# Patient Record
Sex: FEMALE | Race: WHITE
[De-identification: names, ages, dates, MRNs, and addresses within clinical notes are randomized per-mention and may not be internally consistent; named-entity substitution may affect disease eponyms.]

---

## 2020-09-21 ENCOUNTER — HOSPITAL ENCOUNTER (EMERGENCY)
Dept: HOSPITAL 46 - ED | Age: 70
End: 2020-09-21
Payer: MEDICARE

## 2020-09-21 VITALS — BODY MASS INDEX: 21.69 KG/M2 | HEIGHT: 66 IN | WEIGHT: 134.99 LBS

## 2020-09-21 DIAGNOSIS — I48.91: ICD-10-CM

## 2020-09-21 DIAGNOSIS — Z79.899: ICD-10-CM

## 2020-09-21 DIAGNOSIS — I49.9: Primary | ICD-10-CM

## 2020-09-21 DIAGNOSIS — Z87.891: ICD-10-CM

## 2020-09-21 DIAGNOSIS — Z88.8: ICD-10-CM

## 2020-09-21 NOTE — XMS
Encounter Summary
  Created on: 2020
 
 SayraSusana
 External Reference #: [id number]
 : 48
 Sex: Female
 
 Demographics
 
 
+-----------------------+---------------------------+
| Address               | [address]            |
|                       | [place]  [address] |
+-----------------------+---------------------------+
| Home Phone            | [phone]           |
+-----------------------+---------------------------+
| Preferred Language    | Unknown                   |
+-----------------------+---------------------------+
| Marital Status        |                    |
+-----------------------+---------------------------+
| [employer] Affiliation | [id number]                      |
+-----------------------+---------------------------+
| Race                  | White                     |
+-----------------------+---------------------------+
| Ethnic Group          | Not  or     |
+-----------------------+---------------------------+
 
 
 Author
 
 
+--------------+--------------------------------------------+
| Author       | [facility] [place]  |
|              | and [place]                                |
+--------------+--------------------------------------------+
| Organization | [facility] [place]  |
|              | and [place]                                |
+--------------+--------------------------------------------+
| Address      | Unknown                                    |
+--------------+--------------------------------------------+
| Phone        | Unavailable                                |
+--------------+--------------------------------------------+
 
 
 
 Support
 
 
+-------------+--------------+-------------------+-----------------+
| Name        | Relationship | Address           | Phone           |
+-------------+--------------+-------------------+-----------------+
| Eduar Colbert | ECON         | [address]       | [phone] |
|             |              | [place]   |                 |
|             |              | [address]             |                 |
+-------------+--------------+-------------------+-----------------+
 
 
 
 
 Care Team Providers
 
 
+-------------------------+------+-----------------+
| Care Team Member Name   | Role | Phone           |
+-------------------------+------+-----------------+
| Tobin Alex MD | PCP  | [phone] |
+-------------------------+------+-----------------+
 
 
 
 Reason for Visit
 
 
+-----------+--------------------------------------------------------------+
| Reason    | Comments                                                     |
+-----------+--------------------------------------------------------------+
| Follow-up | stool studies done 2017, previous positive Campylobacter |
+-----------+--------------------------------------------------------------+
 
 
 
 Encounter Details
 
 
+--------+---------+----------------------+----------------------+---------------------+
| Date   | Type    | Department           | Care Team            | Description         |
+--------+---------+----------------------+----------------------+---------------------+
| / | Office  |   [facility]          |   Mahin Leiva MD | Diarrhea,           |
| 2017   | Visit   | GASTROENTEROLOGY     |   [address], [facility]  | unspecified type    |
|        |         | [address] [place]  | [address] | (Primary Dx); Full  |
|        |         | [address]  [place] |  [address] [phone] | incontinence of     |
|        |         |  [address]          |   [phone] (Fax) | feces               |
|        |         | [phone]         |                      |                     |
+--------+---------+----------------------+----------------------+---------------------+
 
 
 
 Social History
 
 
+---------------+------------+-----------+--------+------------------+
| Tobacco Use   | Types      | Packs/Day | Years  | Date             |
|               |            |           | Used   |                  |
+---------------+------------+-----------+--------+------------------+
| Former Smoker | Cigarettes | 1.5       | 5      | Quit: 1979 |
+---------------+------------+-----------+--------+------------------+
 
 
 
+---------------------+---+---+---+
| Smokeless Tobacco:  |   |   |   |
| Never Used          |   |   |   |
+---------------------+---+---+---+
 
 
 
+-------------+----------------------+---------+------------------+
| Alcohol Use | Drinks/Week          | oz/Week | Comments         |
 
+-------------+----------------------+---------+------------------+
| Yes         |   1 Shots of liquor  | 1.0     | "social drinker" |
|             |  0 Standard drinks   |         |                  |
|             | or equivalent        |         |                  |
+-------------+----------------------+---------+------------------+
 
 
 
+------------------+---------------+
| Sex Assigned at  | Date Recorded |
| Birth            |               |
+------------------+---------------+
| Not on file      |               |
+------------------+---------------+
 documented as of this encounter
 
 Last Filed Vital Signs
 
 
+-------------------+------------------+----------------------+----------+
| Vital Sign        | Reading          | Time Taken           | Comments |
+-------------------+------------------+----------------------+----------+
| Blood Pressure    | 100/70           | 2017  1:32 PM  |          |
|                   |                  | PDT                  |          |
+-------------------+------------------+----------------------+----------+
| Pulse             | 64               | 2017  1:32 PM  |          |
|                   |                  | PDT                  |          |
+-------------------+------------------+----------------------+----------+
| Temperature       | -                | -                    |          |
+-------------------+------------------+----------------------+----------+
| Respiratory Rate  | 16               | 2017  1:32 PM  |          |
|                   |                  | PDT                  |          |
+-------------------+------------------+----------------------+----------+
| Oxygen Saturation | -                | -                    |          |
+-------------------+------------------+----------------------+----------+
| Inhaled Oxygen    | -                | -                    |          |
| Concentration     |                  |                      |          |
+-------------------+------------------+----------------------+----------+
| Weight            | 64.9 kg (143 lb) | 2017  1:32 PM  |          |
|                   |                  | PDT                  |          |
+-------------------+------------------+----------------------+----------+
| Height            | 170.2 cm (5' 7") | 2017  1:32 PM  |          |
|                   |                  | PDT                  |          |
+-------------------+------------------+----------------------+----------+
| Body Mass Index   | 22.4             | 2017  1:32 PM  |          |
|                   |                  | PDT                  |          |
+-------------------+------------------+----------------------+----------+
 documented in this encounter
 
 Progress Notes
 Mahin Leiva MD - 2017  1:00 PM PDTFormatting of this note might be different from
 the original.
 Subjective: 
  
 Patient ID: Susana Colbert is a 67 y.o. female.
 
 The patient returns to discuss recurrent diarrhea.
 
 Patient underwent colonoscopy in 2017 for evaluation of diarrhea.  Upper endoscopy an
d colonoscopy were negative with respect to visual appearance and biopsies of the mucosa.  C
 
ampylobacter however was positive.  She was treated with azithromycin 500 mg 1 by mouth yovanny
y for 3 days.  She did fairly well for a week or 2 after treatment.  However the stool was g
radually gotten looser and she's had urgency with watery stools.  She denies any blood or mu
cus in the stools chills or fever.  She has increased stool frequency after meals.  Frequenc
y is now up to 5 or 6 a day.  She denies any nocturnal stools.  The patient denies any abdom
inal pain has some lower abdominal cramping prior to evacuation.  Repeat stool studies were 
negative done through into
 
 Patches were negative as well as Giardia antigen and cryptosporidium and C. diff.  The ayala
ent's weight is been stable.  The patient's  has not been ill.  They have well water 
but there is no livestock around the same for possible contamination.  The patient has past 
history of irritable bowel syndrome.  Patient has not tried Imodium or Lomotil as been on so
me probiotics.
 
 Vitals: 
  17 1332 
 BP: 100/70 
 Pulse: 64 
 Resp: 16 
 PainSc:   0 - No pain 
 
 Allergies 
 Allergen Reactions 
   Moexipril Hydrochloride Shortness Of Breath 
   AKA Univasc 
   Metoclopramide Other (See Comments) 
   depression 
   Oxycodone  
 
 Past Medical History: 
 Diagnosis Date 
   Anomalous atrioventricular excitation 2014 
   Arthritis  
   Atrial fibrillation (HCC) 2014 
   Atrial flutter (HCC)  
   Depression with anxiety 2014 
   Disorder of bone and cartilage, unspecified 2014 
   Esophageal reflux 2014 
   Essential hypertension 2014 
   H/O campylobacteriosis 2017 
   Hyperlipidemia 2014 
   Internal derangement of knee 2014 
   Osteoarthritis  
   Osteopenia 2014 
   Postmenopausal disorder 2014 
   Unspecified essential hypertension 2014 
   Unspecified internal derangement of knee 2014 
 
 Past Surgical History: 
 Procedure Laterality Date 
   ATRIAL ABLATION SURGERY   
  Dr. Wilson 
   CARPAL TUNNEL RELEASE Left  
   CARPAL TUNNEL RELEASE Right  
   CATARACT REMOVAL  2013 
   COLONOSCOPY   
   COLONOSCOPY N/A 2017 
  Procedure: COLONOSCOPY;  Surgeon: Mahin Leiva MD;  Location: [facility] MEDICAL PROCEDURE UNIT
 
   HIP ARTHROSCOPY Left  
 
   KNEE ARTHROSCOPY Left  
   KNEE JOINT REPLACEMENT Left 2015 
  Dr. Mike 
   TONSILLECTOMY   
   UPPER GASTROINTESTINAL ENDOSCOPY N/A 2017 
  Procedure: EGD;  Surgeon: Mahin Leiva MD;  Location: [facility] MEDICAL PROCEDURE UNIT 
   VAGINA SURGERY   
 
 Family History 
 Problem Relation Age of Onset 
   Heart attack Father  
   Osteoarthritis Father  
   Hypertension Father  
   Muscle disease Father  
   Kidney disease Father  
   Gout Father  
   Osteoporosis Mother  
   Dementia Mother  
   Other (see comment) Mother  
   PAF 
   Hypertension Mother  
   Muscle disease Mother  
   Kidney disease Mother  
   Heart attack Mother  
   Cancer Mother  
   Hypertension Other  
   Sibling history 
   Heart attack Other  
   Sibling history 
   Muscle disease Other  
   Sibling history 
   Kidney disease Other  
   Sibling history 
 
 Social History 
 
 Social History 
   Marital status:  
   Spouse name: N/A 
   Number of children: N/A 
   Years of education: N/A 
 
 Occupational History 
   Retired LPN  
 
 Social History Main Topics 
   Smoking status: Former Smoker 
   Packs/day: 1.50 
   Years: 5.00 
   Types: Cigarettes 
   Quit date: 1979 
   Smokeless tobacco: Never Used 
   Alcohol use 0.6 oz/week 
   1 Shots of liquor per week 
    Comment: "social drinker" 
   Drug use: No 
   Sexual activity: Not Asked 
 
 Other Topics Concern 
   None 
 
 
 Social History Narrative 
   None 
 
 }
 
 Review of Systems 
 Gastrointestinal: Positive for diarrhea. 
 All other systems reviewed and are negative.
 
 Objective: 
 /70  | Pulse 64  | Resp 16  | Ht 1.702 m (5' 7")  | Wt 64.9 kg (143 lb)  | BMI 22.40 
kg/m 
 
 Physical Exam 
 Constitutional: She is oriented to person, place, and time. She appears well-developed and 
well-nourished. No distress. 
 HENT: 
 Head: Normocephalic and atraumatic. 
 Right Ear: External ear normal. 
 Left Ear: External ear normal. 
 Nose: Nose normal. 
 Mouth/Throat: Oropharynx is clear and moist. No oropharyngeal exudate. 
 Eyes: Conjunctivae and EOM are normal. Pupils are equal, round, and reactive to light. Righ
t eye exhibits no discharge. Left eye exhibits no discharge. No scleral icterus. 
 Neck: Normal range of motion. Neck supple. No JVD present. No tracheal deviation present. 
 Cardiovascular: Normal rate, regular rhythm, normal heart sounds and intact distal pulses. 
 Exam reveals no gallop and no friction rub.  
 No murmur heard.
 Pulmonary/Chest: Effort normal and breath sounds normal. No stridor. No respiratory distres
s. She has no wheezes. She has no rales. She exhibits no tenderness. 
 Abdominal: Soft. Bowel sounds are normal. She exhibits no distension and no mass. There is 
no tenderness. There is no rebound and no guarding. 
 Musculoskeletal: Normal range of motion. She exhibits no edema, tenderness or deformity. 
 Lymphadenopathy: 
   She has no cervical adenopathy. 
 Neurological: She is alert and oriented to person, place, and time. No cranial nerve defici
t. She exhibits normal muscle tone. Coordination normal. 
 Skin: Skin is warm and dry. No rash noted. She is not diaphoretic. No erythema. No pallor. 
 Psychiatric: She has a normal mood and affect. Her behavior is normal. Judgment and thought
 content normal. 
 Nursing note and vitals reviewed.
 
 Assessment: 
 
 Diarrhea questional postinfectious IBS versus recurrent infection although recent stool cul
ture was negative
 
 Plan: 
 
 Repeat course of azithromycin.  If that is negative with respect to Campylobacter will in t
reatment toward postinfectious IBS
 
 Electronically signed by Mahin Leiva MD at 2017  5:12 PM PDTdocumented in this enc
ounter
 
 Plan of Treatment
 
 
+--------+---------+-----------+----------------------+-------------+
 
| Date   | Type    | Specialty | Care Team            | Description |
+--------+---------+-----------+----------------------+-------------+
| 10/12/ | Office  | Neurology |   Jamin Brooks MD  [address] |             |
| 2020   | Visit   |           |  [address]      |             |
|        |         |           | [place]  |             |
|        |         |           | [place] [address]             |             |
|        |         |           | [phone]         |             |
|        |         |           | [phone] (Fax)   |             |
+--------+---------+-----------+----------------------+-------------+
 documented as of this encounter
 
 Visit Diagnoses
 
 
+----------------------------------------+
| Diagnosis                              |
+----------------------------------------+
|   Diarrhea, unspecified type - Primary |
+----------------------------------------+
|   Full incontinence of feces           |
+----------------------------------------+
 documented in this encounter

## 2020-09-21 NOTE — XMS
Encounter Summary
  Created on: 2020
 
 SayraSusana
 External Reference #: [id number]
 : 48
 Sex: Female
 
 Demographics
 
 
+-----------------------+---------------------------+
| Address               | [address]            |
|                       | [place]  [address] |
+-----------------------+---------------------------+
| Home Phone            | [phone]           |
+-----------------------+---------------------------+
| Preferred Language    | Unknown                   |
+-----------------------+---------------------------+
| Marital Status        |                    |
+-----------------------+---------------------------+
| Gnosticism Affiliation | [id number]                      |
+-----------------------+---------------------------+
| Race                  | White                     |
+-----------------------+---------------------------+
| Ethnic Group          | Not  or     |
+-----------------------+---------------------------+
 
 
 Author
 
 
+--------------+--------------------------------------------+
| Author       | [facility] [place]  |
|              | and [place]                                |
+--------------+--------------------------------------------+
| Organization | [facility] [place]  |
|              | and [place]                                |
+--------------+--------------------------------------------+
| Address      | Unknown                                    |
+--------------+--------------------------------------------+
| Phone        | Unavailable                                |
+--------------+--------------------------------------------+
 
 
 
 Support
 
 
+-------------+--------------+-------------------+-----------------+
| Name        | Relationship | Address           | Phone           |
+-------------+--------------+-------------------+-----------------+
| Eduar Colbert | ECON         | [address]       | [phone] |
|             |              | [place]   |                 |
|             |              | [address]             |                 |
+-------------+--------------+-------------------+-----------------+
 
 
 
 
 Care Team Providers
 
 
+-----------------------+------+-------------+
| Care Team Member Name | Role | Phone       |
+-----------------------+------+-------------+
 PCP  | Unavailable |
+-----------------------+------+-------------+
 
 
 
 Encounter Details
 
 
+--------+-----------+----------------------+-----------+-------------+
| Date   | Type      | Department           | Care Team | Description |
+--------+-----------+----------------------+-----------+-------------+
| 10/28/ | Hospital  |   [facility] |           |             |
|    | Encounter |  MED CTR XRAY  [address] |           |             |
|        |           |  [address]       |           |             |
|        |           | [place] [address] |           |             |
|        |           |   [phone]       |           |             |
+--------+-----------+----------------------+-----------+-------------+
 
 
 
 Social History
 
 
+----------------+-------+-----------+--------+------+
| Tobacco Use    | Types | Packs/Day | Years  | Date |
|                |       |           | Used   |      |
+----------------+-------+-----------+--------+------+
| Never Assessed |       |           |        |      |
+----------------+-------+-----------+--------+------+
 
 
 
+------------------+---------------+
| Sex Assigned at  | Date Recorded |
| Birth            |               |
+------------------+---------------+
| Not on file      |               |
+------------------+---------------+
 documented as of this encounter
 
 Plan of Treatment
 
 
+--------+---------+-----------+----------------------+-------------+
| Date   | Type    | Specialty | Care Team            | Description |
+--------+---------+-----------+----------------------+-------------+
| 10/12/ | Office  | Neurology |   Jamin Brooks MD  [address] |             |
| 2020   | Visit   |           |  [address]      |             |
|        |         |           | [place]  |             |
|        |         |           | [place] [address]             |             |
|        |         |           | [phone]         |             |
|        |         |           | [phone] (Fax)   |             |
+--------+---------+-----------+----------------------+-------------+
 
 documented as of this encounter
 
 Visit Diagnoses
 Not on filedocumented in this encounter

## 2020-09-21 NOTE — XMS
Encounter Summary
  Created on: 2020
 
 SayraSusana
 External Reference #: [id number]
 : 48
 Sex: Female
 
 Demographics
 
 
+-----------------------+---------------------------+
| Address               | [address]            |
|                       | [place]  [address] |
+-----------------------+---------------------------+
| Home Phone            | [phone]           |
+-----------------------+---------------------------+
| Preferred Language    | Unknown                   |
+-----------------------+---------------------------+
| Marital Status        |                    |
+-----------------------+---------------------------+
| [employer] Affiliation | 1073                      |
+-----------------------+---------------------------+
| Race                  | White                     |
+-----------------------+---------------------------+
| Ethnic Group          | Not  or     |
+-----------------------+---------------------------+
 
 
 Author
 
 
+--------------+--------------------------------------------+
| Author       | [facility] [place]  |
|              | and [place]                                |
+--------------+--------------------------------------------+
| Organization | [facility] [place]  |
|              | and [place]                                |
+--------------+--------------------------------------------+
| Address      | Unknown                                    |
+--------------+--------------------------------------------+
| Phone        | Unavailable                                |
+--------------+--------------------------------------------+
 
 
 
 Support
 
 
+-------------+--------------+-------------------+-----------------+
| Name        | Relationship | Address           | Phone           |
+-------------+--------------+-------------------+-----------------+
| Eduar Colbert | ECON         | [address]       | [phone] |
|             |              | [place]   |                 |
|             |              | [address]             |                 |
+-------------+--------------+-------------------+-----------------+
 
 
 
 
 Care Team Providers
 
 
+-----------------------+------+-------------+
| Care Team Member Name | Role | Phone       |
+-----------------------+------+-------------+
 PCP  | Unavailable |
+-----------------------+------+-------------+
 
 
 
 Encounter Details
 
 
+--------+-----------+----------------------+-----------+-------------+
| Date   | Type      | Department           | Care Team | Description |
+--------+-----------+----------------------+-----------+-------------+
| / | Hospital  |   [facility] |           |             |
|    | Encounter |  MED CTR LABORATORY  |           |             |
|        |           |  [address] |           |             |
|        |           |  [place]           |           |             |
|        |           | [address]           |           |             |
|        |           | [phone]         |           |             |
+--------+-----------+----------------------+-----------+-------------+
 
 
 
 Social History
 
 
+----------------+-------+-----------+--------+------+
| Tobacco Use    | Types | Packs/Day | Years  | Date |
|                |       |           | Used   |      |
+----------------+-------+-----------+--------+------+
| Never Assessed |       |           |        |      |
+----------------+-------+-----------+--------+------+
 
 
 
+------------------+---------------+
| Sex Assigned at  | Date Recorded |
| Birth            |               |
+------------------+---------------+
| Not on file      |               |
+------------------+---------------+
 documented as of this encounter
 
 Plan of Treatment
 
 
+--------+---------+-----------+----------------------+-------------+
| Date   | Type    | Specialty | Care Team            | Description |
+--------+---------+-----------+----------------------+-------------+
| 10/12/ | Office  | Neurology |   Jamin Brooks MD  [address] |             |
| 2020   | Visit   |           |  [address]      |             |
|        |         |           | [place]  |             |
|        |         |           | [place] [address]             |             |
|        |         |           | [phone]         |             |
|        |         |           | [phone] (Fax)   |             |
 
+--------+---------+-----------+----------------------+-------------+
 documented as of this encounter
 
 Visit Diagnoses
 Not on filedocumented in this encounter

## 2020-09-21 NOTE — XMS
Encounter Summary
  Created on: 2020
 
 SayraSusana
 External Reference #: [id number]
 : 48
 Sex: Female
 
 Demographics
 
 
+-----------------------+---------------------------+
| Address               | [address]            |
|                       | [place]  [address] |
+-----------------------+---------------------------+
| Home Phone            | [phone]           |
+-----------------------+---------------------------+
| Preferred Language    | Unknown                   |
+-----------------------+---------------------------+
| Marital Status        |                    |
+-----------------------+---------------------------+
| Adventism Affiliation | [id number]                      |
+-----------------------+---------------------------+
| Race                  | White                     |
+-----------------------+---------------------------+
| Ethnic Group          | Not  or     |
+-----------------------+---------------------------+
 
 
 Author
 
 
+--------------+--------------------------------------------+
| Author       | [facility]  |
|              | and [place]                                |
+--------------+--------------------------------------------+
| Organization | [facility]  |
|              | and [place]                                |
+--------------+--------------------------------------------+
| Address      | Unknown                                    |
+--------------+--------------------------------------------+
| Phone        | Unavailable                                |
+--------------+--------------------------------------------+
 
 
 
 Support
 
 
+-------------+--------------+-------------------+-----------------+
| Name        | Relationship | Address           | Phone           |
+-------------+--------------+-------------------+-----------------+
| Eduar Colbert | ECON         | [address]       | [phone] |
|             |              | [place]   |                 |
|             |              | [address]             |                 |
+-------------+--------------+-------------------+-----------------+
 
 
 
 
 Care Team Providers
 
 
+-------------------------+------+-----------------+
| Care Team Member Name   | Role | Phone           |
+-------------------------+------+-----------------+
| Tobin Alex MD | PCP  | [phone] |
+-------------------------+------+-----------------+
 
 
 
 Encounter Details
 
 
+--------+-------------+----------------------+----------------------+----------------------
+
| Date   | Type        | Department           | Care Team            | Description          
|
+--------+-------------+----------------------+----------------------+----------------------
+
| / | Orders Only |   [facility]          |   Juan Carlos Howard,   | Right knee pain,     
|
| 2018   |             | ORTHOPEDIC SURGERY   | MD  [address]     | unspecified          
|
|        |             | [address]  [place] | [place]      | chronicity (Primary  
|
|        |             |  [place]           | [address]  [phone]  | Dx)                  
|
|        |             | [address]           |  [phone] (Fax)  |                      
|
|        |             | [phone]         |                      |                      
|
+--------+-------------+----------------------+----------------------+----------------------
+
 
 
 
 Social History
 
 
+---------------+------------+-----------+--------+------------------+
| Tobacco Use   | Types      | Packs/Day | Years  | Date             |
|               |            |           | Used   |                  |
+---------------+------------+-----------+--------+------------------+
| Former Smoker | Cigarettes | 1.5       | 5      | Quit: 1979 |
+---------------+------------+-----------+--------+------------------+
 
 
 
+---------------------+---+---+---+
| Smokeless Tobacco:  |   |   |   |
| Never Used          |   |   |   |
+---------------------+---+---+---+
 
 
 
+-------------+----------------------+---------+------------------+
| Alcohol Use | Drinks/Week          | oz/Week | Comments         |
+-------------+----------------------+---------+------------------+
 
| Yes         |   1 Shots of liquor  | 1.0     | "social drinker" |
|             |  0 Standard drinks   |         |                  |
|             | or equivalent        |         |                  |
+-------------+----------------------+---------+------------------+
 
 
 
+------------------+---------------+
| Sex Assigned at  | Date Recorded |
| Birth            |               |
+------------------+---------------+
| Not on file      |               |
+------------------+---------------+
 documented as of this encounter
 
 Plan of Treatment
 
 
+--------+---------+-----------+----------------------+-------------+
| Date   | Type    | Specialty | Care Team            | Description |
+--------+---------+-----------+----------------------+-------------+
| 10/12/ | Office  | Neurology |   Jamin Brooks MD  [address] |             |
| 2020   | Visit   |           |  [address]      |             |
|        |         |           | [address]  [place]  |             |
|        |         |           | [place] [address]             |             |
|        |         |           | [phone]         |             |
|        |         |           | [phone] (Fax)   |             |
+--------+---------+-----------+----------------------+-------------+
 documented as of this encounter
 
 Results
 XR Knee Right 3 Vw (2018 10:09 AM PDT)
 
+----------+
| Specimen |
+----------+
|          |
+----------+
 
 
 
+------------------------------------------------------------------------+---------------+
| Narrative                                                              | Performed At  |
+------------------------------------------------------------------------+---------------+
|   XR KNEE RIGHT 3 VW 2018 9:54 AM     HISTORY: EPNP --- Right Knee |   [facility] |
|  Pain --- Onset 2016.     COMPARISON: None.     FINDINGS:  Intact   |               |
| appearance of the total left knee arthroplasty. Mild to moderate       |               |
| medial  compartment joint space narrowing of the right knee with only  |               |
| minimal  tricompartmental right knee osteophytosis. Lucent lesion is   |               |
| seen along the  medial tibial plateau epiphysis measuring 12 mm.       |               |
|   Small 9 mm sclerotic region  seen along the medial femoral condyle.  |               |
|     IMPRESSION -   Lucent lesion is seen along the medial tibial       |               |
| plateau epiphysis measuring 12,  this is new since . Given the     |               |
| patient's age, differential includes  metastasis, geode,   infection,  |               |
| or multiple myeloma.     Recommend contrast enhanced MRI for further   |               |
| characterization.           Dictated and Signed by: Nash Ocampo MD |               |
|     Electronically signed: 2018 11:01 AM                           |               |
+------------------------------------------------------------------------+---------------+
 
 
 
 
+--------------------------------------------------------------------------------------+
| Procedure Note                                                                       |
+--------------------------------------------------------------------------------------+
|   Darrell, Rad Results In - 2018 11:04 AM PDT  XR KNEE RIGHT 3 VW 2018 9:54 AM |
|                                                                                      |
| HISTORY: EPNP --- Right Knee Pain --- Onset 2016.                                 |
|                                                                                      |
| COMPARISON: None.                                                                    |
|                                                                                      |
| FINDINGS:                                                                            |
| Intact appearance of the total left knee arthroplasty. Mild to moderate medial       |
| compartment joint space narrowing of the right knee with only minimal                |
| tricompartmental right knee osteophytosis. Lucent lesion is seen along the           |
| medial tibial plateau epiphysis measuring 12 mm.  Small 9 mm sclerotic region        |
| seen along the medial femoral condyle.                                               |
|                                                                                      |
| IMPRESSION -                                                                         |
|  Lucent lesion is seen along the medial tibial plateau epiphysis measuring 12,       |
| this is new since . Given the patient's age, differential includes               |
| metastasis, geode,  infection, or multiple myeloma.                                  |
|                                                                                      |
| Recommend contrast enhanced MRI for further characterization.                        |
|                                                                                      |
| Dictated and Signed by: Nash Ocampo MD                                            |
|  Electronically signed: 2018 11:01 AM                                            |
+--------------------------------------------------------------------------------------+
 
 
 
+---------------+---------+--------------------+--------------+
| Performing    | Address | City/State/Zipcode | Phone Number |
| Organization  |         |                    |              |
+---------------+---------+--------------------+--------------+
|   [facility] |         |                    |              |
+---------------+---------+--------------------+--------------+
 documented in this encounter
 
 Visit Diagnoses
 
 
+-----------------------------------------------------+
| Diagnosis                                           |
+-----------------------------------------------------+
|   Right knee pain, unspecified chronicity - Primary |
+-----------------------------------------------------+
 documented in this encounter

## 2020-09-21 NOTE — XMS
Encounter Summary
  Created on: 2020
 
 SayraSusana
 External Reference #: [id number]
 : 48
 Sex: Female
 
 Demographics
 
 
+-----------------------+---------------------------+
| Address               | [address]            |
|                       | [place]  [address] |
+-----------------------+---------------------------+
| Home Phone            | [phone]           |
+-----------------------+---------------------------+
| Preferred Language    | Unknown                   |
+-----------------------+---------------------------+
| Marital Status        |                    |
+-----------------------+---------------------------+
| Religion Affiliation | 1073                      |
+-----------------------+---------------------------+
| Race                  | White                     |
+-----------------------+---------------------------+
| Ethnic Group          | Not  or     |
+-----------------------+---------------------------+
 
 
 Author
 
 
+--------------+--------------------------------------------+
| Author       | [facility]  |
|              | and [place]                                |
+--------------+--------------------------------------------+
| Organization | [facility]  |
|              | and [place]                                |
+--------------+--------------------------------------------+
| Address      | Unknown                                    |
+--------------+--------------------------------------------+
| Phone        | Unavailable                                |
+--------------+--------------------------------------------+
 
 
 
 Support
 
 
+-------------+--------------+-------------------+-----------------+
| Name        | Relationship | Address           | Phone           |
+-------------+--------------+-------------------+-----------------+
| Eduar Colbert | E[employer]         | [address]       | [phone] |
|             |              | [place]   |                 |
|             |              | [address]             |                 |
+-------------+--------------+-------------------+-----------------+
 
 
 
 
 Care Team Providers
 
 
+-------------------------+------+-----------------+
| Care Team Member Name   | Role | Phone           |
+-------------------------+------+-----------------+
| Tobin Alex MD | PCP  | [phone] |
+-------------------------+------+-----------------+
 
 
 
 Encounter Details
 
 
+--------+----------+----------------------+---------------------+-------------+
| Date   | Type     | Department           | Care Team           | Description |
+--------+----------+----------------------+---------------------+-------------+
| 03/30/ | Episode  |   [facility] [place]          |   AnjelicaSweta H,  |             |
|    | Changes  | GASTROENTEROLOGY     | RN                  |             |
|        |          | [address] [place]  |                     |             |
|        |          | [address]  [place] |                     |             |
|        |          |  [address]          |                     |             |
|        |          | [phone]         |                     |             |
+--------+----------+----------------------+---------------------+-------------+
 
 
 
 Social History
 
 
+---------------+-------+-----------+--------+------+
| Tobacco Use   | Types | Packs/Day | Years  | Date |
|               |       |           | Used   |      |
+---------------+-------+-----------+--------+------+
| Former Smoker |       |           |        |      |
+---------------+-------+-----------+--------+------+
 
 
 
+---------------------+---+---+---+
| Smokeless Tobacco:  |   |   |   |
| Never Used          |   |   |   |
+---------------------+---+---+---+
 
 
 
+------------------------+
| Comments: 10 years ago |
+------------------------+
 
 
 
+-------------+----------------------+---------+------------------+
| Alcohol Use | Drinks/Week          | oz/Week | Comments         |
+-------------+----------------------+---------+------------------+
| Yes         |   0 Standard drinks  | 0.0     | "social drinker" |
|             | or equivalent        |         |                  |
+-------------+----------------------+---------+------------------+
 
 
 
 
+------------------+---------------+
| Sex Assigned at  | Date Recorded |
| Birth            |               |
+------------------+---------------+
| Not on file      |               |
+------------------+---------------+
 documented as of this encounter
 
 Plan of Treatment
 
 
+--------+---------+-----------+----------------------+-------------+
| Date   | Type    | Specialty | Care Team            | Description |
+--------+---------+-----------+----------------------+-------------+
| 10/12/ | Office  | Neurology |   Jamin Brooks MD  [address] |             |
| 2020   | Visit   |           |  [address]      |             |
|        |         |           | [place]  |             |
|        |         |           | [place] [address]             |             |
|        |         |           | [phone]         |             |
|        |         |           | [phone] (Fax)   |             |
+--------+---------+-----------+----------------------+-------------+
 documented as of this encounter
 
 Visit Diagnoses
 Not on filedocumented in this encounter

## 2020-09-21 NOTE — XMS
Encounter Summary
  Created on: 2020
 
 SayraSusana
 External Reference #: [id number]
 : 48
 Sex: Female
 
 Demographics
 
 
+-----------------------+---------------------------+
| Address               | [address]            |
|                       | [place]  [address] |
+-----------------------+---------------------------+
| Home Phone            | [phone]           |
+-----------------------+---------------------------+
| Preferred Language    | Unknown                   |
+-----------------------+---------------------------+
| Marital Status        |                    |
+-----------------------+---------------------------+
| Latter-day Affiliation | [id number]                      |
+-----------------------+---------------------------+
| Race                  | White                     |
+-----------------------+---------------------------+
| Ethnic Group          | Not  or     |
+-----------------------+---------------------------+
 
 
 Author
 
 
+--------------+--------------------------------------------+
| Author       | [facility]  |
|              | and [place]                                |
+--------------+--------------------------------------------+
| Organization | [facility]  |
|              | and [place]                                |
+--------------+--------------------------------------------+
| Address      | Unknown                                    |
+--------------+--------------------------------------------+
| Phone        | Unavailable                                |
+--------------+--------------------------------------------+
 
 
 
 Support
 
 
+-------------+--------------+-------------------+-----------------+
| Name        | Relationship | Address           | Phone           |
+-------------+--------------+-------------------+-----------------+
| Eduar Colbert | ECON         | [address]       | [phone] |
|             |              | [place]   |                 |
|             |              | [address]             |                 |
+-------------+--------------+-------------------+-----------------+
 
 
 
 
 Care Team Providers
 
 
+-------------------------+------+-----------------+
| Care Team Member Name   | Role | Phone           |
+-------------------------+------+-----------------+
| Tobin Alex MD | PCP  | [phone] |
+-------------------------+------+-----------------+
 
 
 
 Reason for Visit
 
 
+--------+--------+----------+
| Reason | Onset  | Comments |
|        | Date   |          |
+--------+--------+----------+
| Other  | / |          |
|        |    |          |
+--------+--------+----------+
 
 
 
 Encounter Details
 
 
+--------+-----------+----------------------+----------------------+-------------+
| Date   | Type      | Department           | Care Team            | Description |
+--------+-----------+----------------------+----------------------+-------------+
| / | Telephone |   [facility] [place]          |   Mahin Leiva MD | Other       |
|    |           | GASTROENTEROLOGY     |   [address] [place]  |             |
|        |           | [address] [place]  | [address]  [place], [place] |             |
|        |           | [address]  [place], [place] |  [address]  [phone] |             |
|        |           |  [address]          |   [phone] (Fax) |             |
|        |           | [phone]         |                      |             |
+--------+-----------+----------------------+----------------------+-------------+
 
 
 
 Social History
 
 
+---------------+-------+-----------+--------+------+
| Tobacco Use   | Types | Packs/Day | Years  | Date |
|               |       |           | Used   |      |
+---------------+-------+-----------+--------+------+
| Former Smoker |       |           |        |      |
+---------------+-------+-----------+--------+------+
 
 
 
+---------------------+---+---+---+
| Smokeless Tobacco:  |   |   |   |
| Never Used          |   |   |   |
+---------------------+---+---+---+
 
 
 
 
+------------------------+
| Comments: 10 years ago |
+------------------------+
 
 
 
+-------------+----------------------+---------+------------------+
| Alcohol Use | Drinks/Week          | oz/Week | Comments         |
+-------------+----------------------+---------+------------------+
| Yes         |   0 Standard drinks  | 0.0     | "social drinker" |
|             | or equivalent        |         |                  |
+-------------+----------------------+---------+------------------+
 
 
 
+------------------+---------------+
| Sex Assigned at  | Date Recorded |
| Birth            |               |
+------------------+---------------+
| Not on file      |               |
+------------------+---------------+
 documented as of this encounter
 
 Miscellaneous Notes
 Telephone Encounter - Nusrat Bustillos RN - 2017  5:13 PM PDTNotified patient th
at Dr. Leiva and Dr. Crawford discussed her Xeralto and atrial flutter and feel it is better zeeshan
t she continues on this her Xeralto prior to EGD and colonoscopy this Friday.  She is agreea
ble.Electronically signed by Nusrat Bustillos RN at 2017  5:14 PM PDTdocumented in
 this encounter
 
 Plan of Treatment
 
 
+--------+---------+-----------+----------------------+-------------+
| Date   | Type    | Specialty | Care Team            | Description |
+--------+---------+-----------+----------------------+-------------+
| 10/12/ | Office  | Neurology |   Jamin Brooks MD  [address] |             |
|    | Visit   |           |  [facility]      |             |
|        |         |           | [place],  |             |
|        |         |           | [place] [address]             |             |
|        |         |           | [phone]         |             |
|        |         |           | [phone] (Fax)   |             |
+--------+---------+-----------+----------------------+-------------+
 documented as of this encounter
 
 Visit Diagnoses
 Not on filedocumented in this encounter

## 2020-09-21 NOTE — XMS
Encounter Summary
  Created on: 2020
 
 SayraSusana
 External Reference #: [id number]
 : 48
 Sex: Female
 
 Demographics
 
 
+-----------------------+---------------------------+
| Address               | [address]            |
|                       | [place]  [address] |
+-----------------------+---------------------------+
| Home Phone            | [phone]           |
+-----------------------+---------------------------+
| Preferred Language    | Unknown                   |
+-----------------------+---------------------------+
| Marital Status        |                    |
+-----------------------+---------------------------+
| Mandaeism Affiliation | 1073                      |
+-----------------------+---------------------------+
| Race                  | White                     |
+-----------------------+---------------------------+
| Ethnic Group          | Not  or     |
+-----------------------+---------------------------+
 
 
 Author
 
 
+--------------+--------------------------------------------+
| Author       | [facility]  |
|              | and [place]                                |
+--------------+--------------------------------------------+
| Organization | [facility]  |
|              | and [place]                                |
+--------------+--------------------------------------------+
| Address      | Unknown                                    |
+--------------+--------------------------------------------+
| Phone        | Unavailable                                |
+--------------+--------------------------------------------+
 
 
 
 Support
 
 
+-------------+--------------+-------------------+-----------------+
| Name        | Relationship | Address           | Phone           |
+-------------+--------------+-------------------+-----------------+
| Eduar Colbert | ECON         | [address]       | [phone] |
|             |              | [place]   |                 |
|             |              | [address]             |                 |
+-------------+--------------+-------------------+-----------------+
 
 
 
 
 Care Team Providers
 
 
+-------------------------+------+-----------------+
| Care Team Member Name   | Role | Phone           |
+-------------------------+------+-----------------+
| Tobin Alex MD | PCP  | [phone] |
+-------------------------+------+-----------------+
 
 
 
 Reason for Visit
 
 
+-------------+--------+-------------------------+
| Reason      | Onset  | Comments                |
|             | Date   |                         |
+-------------+--------+-------------------------+
| Appointment | / | egd,colon with propofol |
|             |    |                         |
+-------------+--------+-------------------------+
 
 
 
 Encounter Details
 
 
+--------+-----------+----------------------+----------------------+------------------+
| Date   | Type      | Department           | Care Team            | Description      |
+--------+-----------+----------------------+----------------------+------------------+
| / | Telephone |   [facility]          |   Mahin Leiva MD | Appointment      |
|    |           | GASTROENTEROLOGY     |   [address] [place]  | (egd,colon with  |
|        |           | [address]  | [address]  [place], [place] | propofol)        |
|        |           | [address]  [place], [place] |  [address]  [phone] |                  |
|        |           |  [address]          |   [phone] (Fax) |                  |
|        |           | [phone]         |                      |                  |
+--------+-----------+----------------------+----------------------+------------------+
 
 
 
 Social History
 
 
+---------------+-------+-----------+--------+------+
| Tobacco Use   | Types | Packs/Day | Years  | Date |
|               |       |           | Used   |      |
+---------------+-------+-----------+--------+------+
| Former Smoker |       |           |        |      |
+---------------+-------+-----------+--------+------+
 
 
 
+---------------------+---+---+---+
| Smokeless Tobacco:  |   |   |   |
| Never Used          |   |   |   |
+---------------------+---+---+---+
 
 
 
 
+------------------------+
| Comments: 10 years ago |
+------------------------+
 
 
 
+-------------+----------------------+---------+------------------+
| Alcohol Use | Drinks/Week          | oz/Week | Comments         |
+-------------+----------------------+---------+------------------+
| Yes         |   0 Standard drinks  | 0.0     | "social drinker" |
|             | or equivalent        |         |                  |
+-------------+----------------------+---------+------------------+
 
 
 
+------------------+---------------+
| Sex Assigned at  | Date Recorded |
| Birth            |               |
+------------------+---------------+
| Not on file      |               |
+------------------+---------------+
 documented as of this encounter
 
 Miscellaneous Notes
 Telephone Encounter - Nusrat Bustillos RN - 2016 10:18 AM PSTSherriecy was in the of
fice seeing Dr. Nava and asked to speak to Doctor Leiva's nurse regarding scheduling EGD an
d colon with propofol.  Reviewed chart and Doctor Leiva felt patient needed to see Dr. Alex 
or cardiologist before scheduling procedures due to onset of atrial fib.  Patient states she
 will be seeing Dr. Alex next week.  She will have him send records after the evaluation.  P
michelle just wanted to make sure that she got the procedures done by Dr Leiva. Will await rec
ords discussing atrial fib. Electronically signed by Nusrat Bustillos RN at 2016 1
0:20 AM PSTdocumented in this encounter
 
 Plan of Treatment
 
 
+--------+---------+-----------+----------------------+-------------+
| Date   | Type    | Specialty | Care Team            | Description |
+--------+---------+-----------+----------------------+-------------+
| 10/12/ | Office  | Neurology |   Jamin Brooks MD  [address] |             |
| 2020   | Visit   |           |  [address]      |             |
|        |         |           | [place],  |             |
|        |         |           | [place] [address]             |             |
|        |         |           | [phone]         |             |
|        |         |           | [phone] (Fax)   |             |
+--------+---------+-----------+----------------------+-------------+
 documented as of this encounter
 
 Visit Diagnoses
 Not on filedocumented in this encounter

## 2020-09-21 NOTE — XMS
Encounter Summary
  Created on: 2020
 
 SayraSusana
 External Reference #: [id number]
 : 48
 Sex: Female
 
 Demographics
 
 
+-----------------------+---------------------------+
| Address               | [address]            |
|                       | [place]  [address] |
+-----------------------+---------------------------+
| Home Phone            | [phone]           |
+-----------------------+---------------------------+
| Preferred Language    | Unknown                   |
+-----------------------+---------------------------+
| Marital Status        |                    |
+-----------------------+---------------------------+
| [employer]ist Affiliation | 1073                      |
+-----------------------+---------------------------+
| Race                  | White                     |
+-----------------------+---------------------------+
| Ethnic Group          | Not  or     |
+-----------------------+---------------------------+
 
 
 Author
 
 
+--------------+--------------------------------------------+
| Author       | [facility] [place]  |
|              | and [place]                                |
+--------------+--------------------------------------------+
| Organization | [facility] [place]  |
|              | and [place]                                |
+--------------+--------------------------------------------+
| Address      | Unknown                                    |
+--------------+--------------------------------------------+
| Phone        | Unavailable                                |
+--------------+--------------------------------------------+
 
 
 
 Support
 
 
+-------------+--------------+-------------------+-----------------+
| Name        | Relationship | Address           | Phone           |
+-------------+--------------+-------------------+-----------------+
| Eduar Colbert | ECON         | [address]       | [phone] |
|             |              | [place]   |                 |
|             |              | [address]             |                 |
+-------------+--------------+-------------------+-----------------+
 
 
 
 
 Care Team Providers
 
 
+------------------------+------+-----------------+
| Care Team Member Name  | Role | Phone           |
+------------------------+------+-----------------+
| Álvaro Cisse MD | PCP  | [phone] |
+------------------------+------+-----------------+
 
 
 
 Reason for Visit
 
 
+-------------+--------+----------+
| Reason      | Onset  | Comments |
|             | Date   |          |
+-------------+--------+----------+
| Appointment | / |          |
|             | 2020   |          |
+-------------+--------+----------+
 
 
 
 Encounter Details
 
 
+--------+-----------+---------------------+----------------------+-------------+
| Date   | Type      | Department          | Care Team            | Description |
+--------+-----------+---------------------+----------------------+-------------+
| / | Telephone |   [facility]     |   Jamin Brooks MD  [address] | Appointment |
|    |           | NEUROLOGY  [address]     |  [facility] [address]      |             |
|        |           | [address]   | [address]  [place],  |             |
|        |           | [place], [place]        | [place] [address]             |             |
|        |           | [address]          | [phone]         |             |
|        |           | [phone]        | [phone] (Fax)   |             |
+--------+-----------+---------------------+----------------------+-------------+
 
 
 
 Social History
 
 
+---------------+------------+-----------+--------+------------------+
| Tobacco Use   | Types      | Packs/Day | Years  | Date             |
|               |            |           | Used   |                  |
+---------------+------------+-----------+--------+------------------+
| Former Smoker | Cigarettes | 1.5       | 5      | Quit: 1979 |
+---------------+------------+-----------+--------+------------------+
 
 
 
+---------------------+---+---+---+
| Smokeless Tobacco:  |   |   |   |
| Never Used          |   |   |   |
+---------------------+---+---+---+
 
 
 
 
+-------------+----------------------+---------+------------------+
| Alcohol Use | Drinks/Week          | oz/Week | Comments         |
+-------------+----------------------+---------+------------------+
| Yes         |   1 Shots of liquor  | 1.0     | "social drinker" |
|             |  0 Standard drinks   |         |                  |
|             | or equivalent        |         |                  |
+-------------+----------------------+---------+------------------+
 
 
 
+------------------+---------------+
| Sex Assigned at  | Date Recorded |
| Birth            |               |
+------------------+---------------+
| Not on file      |               |
+------------------+---------------+
 documented as of this encounter
 
 Miscellaneous Notes
 Telephone Encounter - Trev Brant MCGOWAN - 2020  1:40 PM PDTNancy, [facility]
  and would like a call back.  
 
 Additional Call Details:  Patient stated that she returned Arvin's call in regards to switch
ing her  appointment to Virtual. Patient denied the request, stated that she'd like to s
ee the doctor in person and to keep the appointment as is. If any questions, patient Can be 
reached at Mobile Number listed in Chart. 
 
 ________________________________________________________________
 If this is a symptom based call, was patient offered triage?  Not Applicable
 
 If this is a symptom based call and you were unable to immediately transfer the call to a radha mix or RN was caller made aware that if at any time she feels it is an emergency they sh
ould call 911 or go to the nearest emergency room? not applicable Electronically signed by EVIE Karimi at 2020  1:42 PM PDTdocumented in this encounter
 
 Plan of Treatment
 
 
+--------+---------+-----------+----------------------+-------------+
| Date   | Type    | Specialty | Care Team            | Description |
+--------+---------+-----------+----------------------+-------------+
| 10/12/ | Office  | Neurology |   Jamin Brooks MD  [address] |             |
| 2020   | Visit   |           |  [address]      |             |
|        |         |           | [place],  |             |
|        |         |           | [place] [address]             |             |
|        |         |           | [phone]         |             |
|        |         |           | [phone] (Fax)   |             |
+--------+---------+-----------+----------------------+-------------+
 documented as of this encounter
 
 Visit Diagnoses
 Not on filedocumented in this encounter

## 2020-09-21 NOTE — XMS
Encounter Summary
  Created on: 2020
 
 SayraSusana
 External Reference #: [id number]
 : 48
 Sex: Female
 
 Demographics
 
 
+-----------------------+---------------------------+
| Address               | [address]            |
|                       | [place]  [address] |
+-----------------------+---------------------------+
| Home Phone            | [phone]           |
+-----------------------+---------------------------+
| Preferred Language    | Unknown                   |
+-----------------------+---------------------------+
| Marital Status        |                    |
+-----------------------+---------------------------+
| Mandaen Affiliation | [id number]                      |
+-----------------------+---------------------------+
| Race                  | White                     |
+-----------------------+---------------------------+
| Ethnic Group          | Not  or     |
+-----------------------+---------------------------+
 
 
 Author
 
 
+--------------+--------------------------------------------+
| Author       | [facility]  |
|              | [facility] [place]                                |
+--------------+--------------------------------------------+
| Organization | [facility]  |
|              | [facility] [place]                                |
+--------------+--------------------------------------------+
| Address      | Unknown                                    |
+--------------+--------------------------------------------+
| Phone        | Unavailable                                |
+--------------+--------------------------------------------+
 
 
 
 Support
 
 
+-------------+--------------+-------------------+-----------------+
| Name        | Relationship | Address           | Phone           |
+-------------+--------------+-------------------+-----------------+
| Eduar Colbert | ECON         | [address]       | [phone] |
|             |              | [place]   |                 |
|             |              | [address]             |                 |
+-------------+--------------+-------------------+-----------------+
 
 
 
 
 Care Team Providers
 
 
+-------------------------+------+-----------------+
| Care Team Member Name   | Role | Phone           |
+-------------------------+------+-----------------+
| Tobin Alex MD | PCP  | [phone] |
+-------------------------+------+-----------------+
 
 
 
 Reason for Referral
 Diagnostic/Screening (Routine)
 
+--------+--------+-----------+--------------+--------------+---------------+
| Status | Reason | Specialty | Diagnoses /  | Referred By  | Referred To   |
|        |        |           | Procedures   | Contact      | Contact       |
+--------+--------+-----------+--------------+--------------+---------------+
| Closed |        | Radiology |   Diagnoses  |   [place],   |   [facility] |
|        |        |           |  Bone lesion | Juan Carlos CHARLES MD   |  Medicine     |
|        |        |           |   Abnormal   | [address] | [address]  |
|        |        |           | MRI          |   [place]      |  [place], |
|        |        |           | Procedures   | [place], [place]    |  [place]           |
|        |        |           | NM Bone Scan | [id number]        | [address]    |
|        |        |           |  3 Phase     | Phone:       | Phone:        |
|        |        |           |              | [phone] | [phone]  |
|        |        |           |              |   Fax:       |  Fax:         |
|        |        |           |              | [phone] | [phone]  |
+--------+--------+-----------+--------------+--------------+---------------+
 
 
 
 
 Encounter Details
 
 
+--------+-------------+----------------------+----------------------+----------------------
+
| Date   | Type        | Department           | Care Team            | Description          
|
+--------+-------------+----------------------+----------------------+----------------------
+
| / | Orders Only |   [facility]          |   Juan Carlos Howard,   | Bone lesion (Primary 
|
|    |             | ORTHOPEDIC SURGERY   | MD  [address]     |  Dx); Abnormal MRI   
|
|        |             | [address]  [place] | [place]      |                      
|
|        |             |  [place]           | [address]  [phone]  |                      
|
|        |             | [address]           |  [phone] (Fax)  |                      
|
|        |             | [phone]         |                      |                      
|
+--------+-------------+----------------------+----------------------+----------------------
+
 
 
 
 
 Social History
 
 
+---------------+------------+-----------+--------+------------------+
| Tobacco Use   | Types      | Packs/Day | Years  | Date             |
|               |            |           | Used   |                  |
+---------------+------------+-----------+--------+------------------+
| Former Smoker | Cigarettes | 1.5       | 5      | Quit: 1979 |
+---------------+------------+-----------+--------+------------------+
 
 
 
+---------------------+---+---+---+
| Smokeless Tobacco:  |   |   |   |
| Never Used          |   |   |   |
+---------------------+---+---+---+
 
 
 
+-------------+----------------------+---------+------------------+
| Alcohol Use | Drinks/Week          | oz/Week | Comments         |
+-------------+----------------------+---------+------------------+
| Yes         |   1 Shots of liquor  | 1.0     | "social drinker" |
|             |  0 Standard drinks   |         |                  |
|             | or equivalent        |         |                  |
+-------------+----------------------+---------+------------------+
 
 
 
+------------------+---------------+
| Sex Assigned at  | Date Recorded |
| Birth            |               |
+------------------+---------------+
| Not on file      |               |
+------------------+---------------+
 documented as of this encounter
 
 Plan of Treatment
 
 
+--------+---------+-----------+----------------------+-------------+
| Date   | Type    | Specialty | Care Team            | Description |
+--------+---------+-----------+----------------------+-------------+
| 10/12/ | Office  | Neurology |   Jamin Brooks MD  [address] |             |
| 2020   | Visit   |           |  [address]      |             |
|        |         |           | [address]  [place],  |             |
|        |         |           | [place] [address]             |             |
|        |         |           | [phone]         |             |
|        |         |           | [phone] (Fax)   |             |
+--------+---------+-----------+----------------------+-------------+
 documented as of this encounter
 
 Results
 NM Bone Scan 3 Phase (2018 12:55 PM PDT)
 
+----------+
| Specimen |
+----------+
|          |
+----------+
 
 
 
 
+------------------------------------------------------------------------+---------------+
| Narrative                                                              | Performed At  |
+------------------------------------------------------------------------+---------------+
|   EXAM:NM BONE SCAN 3 PHASE     CLINICAL HISTORY: Tibial Bone Lesion-  |   [facility] |
| Evaluate for the presence of other  Lesions, Further Evaluate for      |               |
| Infection VS Neoplasm such as metastasis or  myeloma.     TECHNIQUE:   |               |
| Blood flow and Blood pool images are acquired. 3 hour delayed whole    |               |
| body imaging is performed following the intravenous administration of  |               |
| 26.3  millicuries of technetium 99m MDP.     COMPARISON: 2018      |               |
| radiograph.   2018 MRI     FINDINGS:      Blood flow: Symmetric.  |               |
|     Blood pool: Symmetric.     3 hour delay: Focal area of metabolic   |               |
| activity in the medial right tibial  plateau.   No definite metabolic  |               |
| activity in the superior pole of the patella or  right femur.   There  |               |
| is a small amount of activity in the tibia and femur  arthroplasty     |               |
| components.   This probably falls within and except at range of        |               |
| activity post surgically.   There are degenerative changes in the      |               |
| hands  bilaterally.     IMPRESSION -     Solitary focal metabolic      |               |
| activity in the medial right tibial plateau  corresponding to the      |               |
| plain film and MRI finding.   The pattern of the lesion  morphology on |               |
|  plain film, MRI, and bone scan can be seen with degenerative  related |               |
|  subchondral cystic changes.   It is felt to be unlikely that this     |               |
| represents infection, a metastasis or plasmacytoma.     Dictated and   |               |
| Signed by: Mahin Abebe MD    Electronically signed: 2018     |               |
| 1:50 PM                                                                |               |
+------------------------------------------------------------------------+---------------+
 
 
 
+---------------------------------------------------------------------------------+
| Procedure Note                                                                  |
+---------------------------------------------------------------------------------+
|   Darrell, Rad Results In - 2018  1:53 PM PDT  EXAM:NM BONE SCAN 3 PHASE      |
|                                                                                 |
| CLINICAL HISTORY: Tibial Bone Lesion- Evaluate for the presence of other        |
| Lesions, Further Evaluate for Infection VS Neoplasm such as metastasis or       |
| myeloma.                                                                        |
|                                                                                 |
| TECHNIQUE: Blood flow and Blood pool images are acquired. 3 hour delayed whole  |
| body imaging is performed following the intravenous administration of 26.3      |
| millicuries of technetium 99m MDP.                                              |
|                                                                                 |
| COMPARISON: 2018 radiograph.  2018 MRI                                 |
|                                                                                 |
| FINDINGS:                                                                       |
|                                                                                 |
| Blood flow: Symmetric.                                                          |
|                                                                                 |
| Blood pool: Symmetric.                                                          |
|                                                                                 |
| 3 hour delay: Focal area of metabolic activity in the medial right tibial       |
| plateau.  No definite metabolic activity in the superior pole of the patella or |
| right femur.  There is a small amount of activity in the tibia and femur        |
| arthroplasty components.  This probably falls within and except at range of     |
| activity post surgically.  There are degenerative changes in the hands          |
| bilaterally.                                                                    |
|                                                                                 |
| IMPRESSION -                                                                    |
 
|                                                                                 |
| Solitary focal metabolic activity in the medial right tibial plateau            |
| corresponding to the plain film and MRI finding.  The pattern of the lesion     |
| morphology on plain film, MRI, and bone scan can be seen with degenerative      |
| related subchondral cystic changes.  It is felt to be unlikely that this        |
| represents infection, a metastasis or plasmacytoma.                             |
|                                                                                 |
| Dictated and Signed by: Mahin Abebe MD                                     |
|  Electronically signed: 2018 1:50 PM                                       |
+---------------------------------------------------------------------------------+
 
 
 
+---------------+---------+--------------------+--------------+
| Performing    | Address | City/State/Zipcode | Phone Number |
| Organization  |         |                    |              |
+---------------+---------+--------------------+--------------+
|   [facility] |         |                    |              |
+---------------+---------+--------------------+--------------+
 documented in this encounter
 
 Visit Diagnoses
 
 
+----------------------------------------------------------------------------------+
| Diagnosis                                                                        |
+----------------------------------------------------------------------------------+
|   Bone lesion - Primary  Disorder of bone and cartilage, unspecified             |
+----------------------------------------------------------------------------------+
|   Abnormal MRI  Other nonspecific (abnormal) findings on radiological and other  |
| examinations of body structure                                                   |
+----------------------------------------------------------------------------------+
 documented in this encounter

## 2020-09-21 NOTE — XMS
Encounter Summary
  Created on: 2020
 
 Susana Colbert
 External Reference #: [id number]
 : 48
 Sex: Female
 
 Demographics
 
 
+-----------------------+------------------------+
| Address               | [address]         |
|                       | [place]  [address]   |
+-----------------------+------------------------+
| Home Phone            | [phone]        |
+-----------------------+------------------------+
| Preferred Language    | Unknown                |
+-----------------------+------------------------+
| Marital Status        |                 |
+-----------------------+------------------------+
| Judaism Affiliation | Unknown                |
+-----------------------+------------------------+
| Race                  | White                  |
+-----------------------+------------------------+
| Ethnic Group          | Not  or  |
+-----------------------+------------------------+
 
 
 Author
 
 
+--------------+------------------------------+
| Author       | [facility] |
+--------------+------------------------------+
| Organization | [facility] |
+--------------+------------------------------+
| Address      | Unknown                      |
+--------------+------------------------------+
| Phone        | Unavailable                  |
+--------------+------------------------------+
 
 
 
 Support
 
 
+--------------+--------------+---------+-----------------+
| Name         | Relationship | Address | Phone           |
+--------------+--------------+---------+-----------------+
| Raj Mckeon | [place]         | Unknown | [phone] |
+--------------+--------------+---------+-----------------+
 
 
 
 Care Team Providers
 
 
 
+-------------------------+------+-----------------+
| Care Team Member Name   | Role | Phone           |
+-------------------------+------+-----------------+
| Tobin Alex MD | PCP  | [phone] |
+-------------------------+------+-----------------+
 
 
 
 Reason for Visit
 
 
+---------------------+--------+-------------+
| Reason              | Onset  | Comments    |
|                     | Date   |             |
+---------------------+--------+-------------+
| Medication Question | / | Dr. Shine |
|                     |    |             |
+---------------------+--------+-------------+
 
 
 
 Encounter Details
 
 
+--------+-----------+----------------------+----------------------+----------------------+
| Date   | Type      | Department           | Care Team            | Description          |
+--------+-----------+----------------------+----------------------+----------------------+
| / | Telephone |   [facility] |   Fátima,           | Medication Question  |
|    |           |  [facility]    | MD Veronica  [address]  | (Dr. Shine)        |
|        |           | Pavilion  [address]     | [facility]  |                      |
|        |           | [address]    | [address]  [place], [place]     |                      |
|        |           | Pavilion, 7th floor  | [address]           |                      |
|        |           |  [place]        | [phone]         |                      |
|        |           | [address]           | [phone] (Fax)   |                      |
|        |           | [phone]         |                      |                      |
+--------+-----------+----------------------+----------------------+----------------------+
 
 
 
 Social History
 
 
+---------------+------------+-----------+--------+------+
| Tobacco Use   | Types      | Packs/Day | Years  | Date |
|               |            |           | Used   |      |
+---------------+------------+-----------+--------+------+
| Former Smoker | Cigarettes | 2         | 6      |      |
+---------------+------------+-----------+--------+------+
 
 
 
+-------------+-------------+---------+----------+
| Alcohol Use | Drinks/Week | oz/Week | Comments |
+-------------+-------------+---------+----------+
| Yes         |             |         |          |
+-------------+-------------+---------+----------+
 
 
 
+------------------+---------------+
 
| Sex Assigned at  | Date Recorded |
| Birth            |               |
+------------------+---------------+
| Not on file      |               |
+------------------+---------------+
 documented as of this encounter
 
 Miscellaneous Notes
 Telephone Encounter - Yasmin Alcocer - 2010  2:54 PM PDTProvider's message given to
 patient.Electronically signed by Yasmin Alcocer at 2010  2:54 PM PDTTelephone Encoun
ter - Veronica Shine MD - 2010  4:33 PM PDTIn my years of clinical experience, I 
have not found that diflucan causes vestibulodynia. Sometimes recurrent yeast infections can
 act like the "trigger" that starts the pain cycle.  Thanks for hte updateElectronically sig
eduardo by Veronica Shine MD at 2010  4:33 PM PDTTelephone Encounter - Meeta Alcocer - 2010  2:31 PM PDTNancy forgot to tell the following to Dr Shine at her  visi
t.
 
 She is susceptible to yeast infections and is currently taking Diflucan 150mg monthly.  She
 was found to have a yeast infection about 6-8 weeks ago and was put on 3 doses of weekly Di
flucan.  After her surgery and taking antibiotics about 4 weeks ago, her doctor has her taki
ng monthly diflucan.
 
 Susana states she has read that yeast infections can possible "cause" her condition and want
ed Dr Shine to know this updated history.
 
 Diflucan added to med list.
 
 Routing to Dr Shine for reviewElectronically signed by Yasmin Alcocer at 2010  2:3
2 PM PDTTelephone Encounter - Cate Schulz - 2010  1:41 PM PDTPatient returning call
 to the nurse.
 
 Patient states it is ok  to leave confidential message on patient's answering machine. Elec
tronically signed by Cate Schulz at 2010  1:41 PM PDTTelephone Encounter - Layne Navas Rn - 2010 11:56 AM PDTPt called and left message to call clinic to further disc
uss Diflucan dose, regimen, when started.  Will then forward to provider for advise. Electro
nically signed by Layne Navas Rn at 2010 11:56 AM PDTTelephone Encounter - Ana Lilia Jaquez - 2010 11:29 AM PDTPt states, "I forgot to mention to Dr Shine that I'm currentl
y taking diflucan as prescribed by my local doctor, Dr Haile.  I was prescribed diflucan for 
a yeast infection.  I was wondering if Dr Shine thought that my diagnosis could have be st
arted by a yeast infection.  Also, should I continue taking the diflucan?"
 
 [phone]  Patient states it is ok  to leave confidential message on patient's answering
 machine.
 Electronically signed by Nimesh Jaquez at 2010 11:29 AM PDTdocumented in this encounter
 
 Plan of Treatment
 Not on filedocumented as of this encounter
 
 Visit Diagnoses
 Not on filedocumented in this encounter

## 2020-09-21 NOTE — XMS
Encounter Summary
  Created on: 2020
 
 SayraSusana
 External Reference #: [id number]
 : 48
 Sex: Female
 
 Demographics
 
 
+-----------------------+---------------------------+
| Address               | [address]            |
|                       | [place]  [address] |
+-----------------------+---------------------------+
| Home Phone            | [phone]           |
+-----------------------+---------------------------+
| Preferred Language    | Unknown                   |
+-----------------------+---------------------------+
| Marital Status        |                    |
+-----------------------+---------------------------+
| Latter-day Affiliation | [id number]                      |
+-----------------------+---------------------------+
| Race                  | White                     |
+-----------------------+---------------------------+
| Ethnic Group          | Not  or     |
+-----------------------+---------------------------+
 
 
 Author
 
 
+--------------+--------------------------------------------+
| Author       | [facility]  |
|              | [facility] [place]                                |
+--------------+--------------------------------------------+
| Organization | [facility]  |
|              | [facility] [place]                                |
+--------------+--------------------------------------------+
| Address      | Unknown                                    |
+--------------+--------------------------------------------+
| Phone        | Unavailable                                |
+--------------+--------------------------------------------+
 
 
 
 Support
 
 
+-------------+--------------+-------------------+-----------------+
| Name        | Relationship | Address           | Phone           |
+-------------+--------------+-------------------+-----------------+
| Eduar Colbert | ECON         | [address]       | [phone] |
|             |              | [place]   |                 |
|             |              | [id number]             |                 |
+-------------+--------------+-------------------+-----------------+
 
 
 
 
 Care Team Providers
 
 
+-------------------------+------+-----------------+
| Care Team Member Name   | Role | Phone           |
+-------------------------+------+-----------------+
| Tobin Alex MD | PCP  | [phone] |
+-------------------------+------+-----------------+
 
 
 
 Reason for Referral
 Diagnostic/Screening (Routine)
 
+--------+--------+-----------+--------------+--------------+---------------+
| Status | Reason | Specialty | Diagnoses /  | Referred By  | Referred To   |
|        |        |           | Procedures   | Contact      | Contact       |
+--------+--------+-----------+--------------+--------------+---------------+
| Closed |        | Radiology |   Diagnoses  |   Lee,   |   [facility]     |
|        |        |           |  Right knee  | Juan Carlos CHARLES MD   | [address]  |
|        |        |           | pain,        | [address] |  [place], |
|        |        |           | unspecified  |   [place]      |  [place]           |
|        |        |           | chronicity   | [place]    | [address]    |
|        |        |           | Primary      | [address]        | Phone:        |
|        |        |           | osteoarthrit | Phone:       | [phone]  |
|        |        |           | is of right  | [phone] |  Fax:         |
|        |        |           | knee         |   Fax:       | [phone]  |
|        |        |           | Procedures   | [phone] |               |
|        |        |           | MRI Knee     |              |               |
|        |        |           | Right w wo   |              |               |
|        |        |           | Contrast     |              |               |
+--------+--------+-----------+--------------+--------------+---------------+
 
 
 
 
 Reason for Visit
 Diagnostic/Screening (Routine)
 
+--------+--------+-----------+--------------+--------------+---------------+
| Status | Reason | Specialty | Diagnoses /  | Referred By  | Referred To   |
|        |        |           | Procedures   | Contact      | Contact       |
+--------+--------+-----------+--------------+--------------+---------------+
| Closed |        | Radiology |   Diagnoses  |   Lee,   |   [facility]     |
|        |        |           |  Right knee  | Juan Carlos CHARLES MD   | [address]  |
|        |        |           | pain,        | [address] |  [place], |
|        |        |           | unspecified  |   [place]      |  [place]           |
|        |        |           | chronicity   | [place]    | [address]    |
|        |        |           | Primary      | [address]        | Phone:        |
|        |        |           | osteoarthrit | Phone:       | [phone]  |
|        |        |           | is of right  | [phone] |  Fax:         |
|        |        |           | knee         |   Fax:       | [phone]  |
|        |        |           | Procedures   | [phone] |               |
|        |        |           | MRI Knee     |              |               |
|        |        |           | Right w wo   |              |               |
|        |        |           | Contrast     |              |               |
+--------+--------+-----------+--------------+--------------+---------------+
 
 
 
 
 
 Encounter Details
 
 
+--------+-----------+----------------------+----------------------+----------------------+
| Date   | Type      | Department           | Care Team            | Description          |
+--------+-----------+----------------------+----------------------+----------------------+
| / | Hospital  |   [facility] |   Juan Carlos Howard,   | Right knee pain,     |
| 2018   | Encounter |  [facility]  [address]  | MD  [address]     | unspecified          |
|        |           | [address]  [place], | [place]      | chronicity; Primary  |
|        |           |  [place] [address]       | [address]  [phone]  | osteoarthritis of    |
|        |           | [phone]         |  [phone] (Fax)  | right knee           |
+--------+-----------+----------------------+----------------------+----------------------+
 
 
 
 Social History
 
 
+---------------+------------+-----------+--------+------------------+
| Tobacco Use   | Types      | Packs/Day | Years  | Date             |
|               |            |           | Used   |                  |
+---------------+------------+-----------+--------+------------------+
| Former Smoker | Cigarettes | 1.5       | 5      | Quit: 1979 |
+---------------+------------+-----------+--------+------------------+
 
 
 
+---------------------+---+---+---+
| Smokeless Tobacco:  |   |   |   |
| Never Used          |   |   |   |
+---------------------+---+---+---+
 
 
 
+-------------+----------------------+---------+------------------+
| Alcohol Use | Drinks/Week          | oz/Week | Comments         |
+-------------+----------------------+---------+------------------+
| Yes         |   1 Shots of liquor  | 1.0     | "social drinker" |
|             |  0 Standard drinks   |         |                  |
|             | or equivalent        |         |                  |
+-------------+----------------------+---------+------------------+
 
 
 
+------------------+---------------+
| Sex Assigned at  | Date Recorded |
| Birth            |               |
+------------------+---------------+
| Not on file      |               |
+------------------+---------------+
 documented as of this encounter
 
 Medications at Time of Discharge
 
 
+----------------------+----------------------+-----------+---------+----------+-----------+
| Medication           | Sig                  | Dispensed | Refills | Start    | End Date  |
|                      |                      |           |         | Date     |           |
 
+----------------------+----------------------+-----------+---------+----------+-----------+
|                      | Take 1,500 mg by     |           | 0       |          |           |
| Calcium-Magnesium-Vi | mouth.               |           |         |          |           |
| tamin D (CITRACAL    |                      |           |         |          |           |
| SLOW RELEASE)        |                      |           |         |          |           |
| 600-           |                      |           |         |          |           |
| MG-MG-UNIT TB24      |                      |           |         |          |           |
+----------------------+----------------------+-----------+---------+----------+-----------+
|   cholecalciferol    | Take 400 Units by    |           | 0       | 20 |           |
| (VITAMIN D-3) 400    | mouth Daily.         |           |         | 12       |           |
| units TABS           |                      |           |         |          |           |
+----------------------+----------------------+-----------+---------+----------+-----------+
|   ipratropium        | INSTILL TWO SPRAYS   |   15 mL   | 5       | 20 |           |
| (ATROVENT) 0.06%     | IN EACH NOSTRIL UP   |           |         | 17       |           |
| nasal spray          | TO FOUR TIMES DAILY  |           |         |          |           |
|                      | AS NEEDED            |           |         |          |           |
+----------------------+----------------------+-----------+---------+----------+-----------+
|   Polyethylene       | Apply  to eye Daily  |           | 0       |          |           |
| Glycol 400 (BLINK    | as needed.           |           |         |          |           |
| TEARS OP)            |                      |           |         |          |           |
+----------------------+----------------------+-----------+---------+----------+-----------+
|   alendronate        |                      |           | 12      | 20 |  |
| (FOSAMAX) 70 mg      |                      |           |         | 18       | 0         |
| tablet               |                      |           |         |          |           |
+----------------------+----------------------+-----------+---------+----------+-----------+
|   estradiol          | Place 10 mcg         |           | 0       |          |  |
| (VAGIFEM) 10 mcg     | vaginally as needed. |           |         |          | 0         |
| vaginal tablet       |                      |           |         |          |           |
+----------------------+----------------------+-----------+---------+----------+-----------+
|   Lysine 500 MG CAPS | Take 500 mg by mouth |           | 0       |          |  |
|                      |  Daily.              |           |         |          | 0         |
+----------------------+----------------------+-----------+---------+----------+-----------+
|   metoprolol         | one tablet by mouth  |           | 1       | 20 |  |
| succinate            | daily                |           |         | 17       | 0         |
| (TOPROL-XL) 25 mg 24 |                      |           |         |          |           |
|  hr tablet           |                      |           |         |          |           |
+----------------------+----------------------+-----------+---------+----------+-----------+
|   mupirocin          | Apply topically to   |   22 g    | 1       | 20 |  |
| (BACTROBAN) 2%       | the nose as          |           |         | 15       | 0         |
| ointment             | directed.            |           |         |          |           |
+----------------------+----------------------+-----------+---------+----------+-----------+
|   rivaroxaban        | Take 20 mg by mouth  |           | 0       |          |  |
| (XARELTO) 20 mg      | Daily.               |           |         |          | 0         |
| tablet               |                      |           |         |          |           |
+----------------------+----------------------+-----------+---------+----------+-----------+
|   sertraline         | 1 and  tablet by  |           | 0       | 20 |  |
| (ZOLOFT) 50 mg       | mouth daily          |           |         | 12       | 0         |
| tablet               |                      |           |         |          |           |
+----------------------+----------------------+-----------+---------+----------+-----------+
 documented as of this encounter
 
 Plan of Treatment
 
 
+--------+---------+-----------+----------------------+-------------+
| Date   | Type    | Specialty | Care Team            | Description |
+--------+---------+-----------+----------------------+-------------+
| 10/12/ | Office  | Neurology |   Jamin Brooks MD  [address] |             |
| 2020   | Visit   |           |  [address]      |             |
|        |         |           | [place]  |             |
 
|        |         |           | [place] [address]             |             |
|        |         |           | [phone]         |             |
|        |         |           | [phone] (Fax)   |             |
+--------+---------+-----------+----------------------+-------------+
 documented as of this encounter
 
 Procedures
 
 
+----------------------+--------+-------------+----------------------+----------------------
+
| Procedure Name       | Priori | Date/Time   | Associated Diagnosis | Comments             
|
|                      | ty     |             |                      |                      
|
+----------------------+--------+-------------+----------------------+----------------------
+
| MRI KNEE RIGHT W WO  | Routin | 2018  |   Right knee pain,   |   Results for this   
|
| CONTRAST             | e      |  8:09 AM    | unspecified          | procedure are in the 
|
|                      |        | PDT         | chronicity  Primary  |  results section.    
|
|                      |        |             | osteoarthritis of    |                      
|
|                      |        |             | right knee           |                      
|
+----------------------+--------+-------------+----------------------+----------------------
+
 documented in this encounter
 
 Results
 MRI Knee Right w wo Contrast (2018  8:09 AM PDT)
 
+----------+
| Specimen |
+----------+
|          |
+----------+
 
 
 
+------------------------------------------------------------------------+---------------+
| Narrative                                                              | Performed At  |
+------------------------------------------------------------------------+---------------+
|   UNENHANCED AND ENHANCED MRI RIGHT KNEE 2018 7:48 AM             |   [facility] |
| CLINICAL HISTORY: Right Knee Pain- Further Evaluate the Tibial Lesion  |               |
| see on the  Xray           COMPARISON: Radiographs  and        |               |
| 2015      TECHNIQUE: The following 3T MR sequences of the     |               |
| right knee were obtained:  1. Coronal T1 and axial T1 with fat         |               |
| suppression.  2. Axial proton density with fat-suppression and         |               |
| sagittal proton density without  fat-suppression.  3. Sagittal T2 with |               |
|  fat-suppression.     4. Coronal STIR.     5. Following the uneventful |               |
|  intravenous administration of 6 cc Gadavist, axial,  sagittal and     |               |
| coronal fat suppressed T1 sequences were obtained.     FINDINGS: A     |               |
| lobulated, circumscribed lesion is present peripherally in the         |               |
| anteromedial tibial plateau, measuring up to 1.2 x 0.8 x 1.5 cm in     |               |
| size, and  corresponding with the lytic lesion described on recent     |               |
| radiography.   The lesion  closely approximates the adjacent tibial    |               |
| cortex, which appears grossly  maintained.   The contents of the       |               |
 
| lesion are heterogeneously hyperintense on  T2-weighted images, with   |               |
| some thin low signal internal septae suggested.   The  lesion is       |               |
| homogeneously higher signal than the adjacent marrow on the            |               |
| unenhanced, fat-suppressed T1-weighted images, with smoothly           |               |
| marginated  enhancement present predominantly in the inferior aspect   |               |
| of the lesion following  contrast administration.   Edema and some     |               |
| patchy enhancement of the surrounding  marrow is also apparent.   An   |               |
| additional 10 mm rounded, fairly circumscribed  mixed signal lesion is |               |
|  present in the subarticular, slightly posterior aspect of  the medial |               |
|  femoral condyle and demonstrates no visible enhancement or signal     |               |
| alteration involving the adjacent marrow, and corresponds with a       |               |
| stable rounded  sclerotic lesion visible on radiography dating back to |               |
|  .   An ovoid region of  increased T2-weighted signal in the far   |               |
| superior pole of the patella measures up  to 1.4 x 0.6 x 0.5 cm and    |               |
| demonstrates no convincing enhancement following  contrast             |               |
| administration.   Marrow signal is normal elsewhere.   No fracture is  |               |
|  evident.     The anterior and posterior cruciate ligaments, medial    |               |
| collateral ligament and  lateral collateral ligamentous complex appear |               |
|  intact and unremarkable, along  with the patellar retinacula,         |               |
| patellar tendon and imaged quadriceps tendon.   Localized signal       |               |
| alteration involving the free margin of the body and posterior  horn   |               |
| of the medial meniscus is suspicious for meniscal tear.   The lateral  |               |
|  meniscus appears intact and unremarkable.   There is moderate,        |               |
| generalized  cartilage loss within the medial femorotibial joint       |               |
| compartment.   Cartilage  within the lateral compartment is            |               |
| maintained.   There is mild to moderate  cartilage loss involving the  |               |
| patella.   A small volume of joint fluid is present.   No              |               |
| intra-articular loose body is identified.   Soft tissues about the     |               |
| knee are  otherwise unremarkable.     IMPRESSION -  1.   1.5 CM        |               |
| CIRCUMSCRIBED, SEPTATED AND ECCENTRICALLY ENHANCING LESION IN THE      |               |
| ANTEROMEDIAL ASPECT OF THE TIBIAL PLATEAU, CORRESPONDING WITH A LYTIC  |               |
| LESION  DESCRIBED ON RECENT RADIOGRAPHY, WITH EDEMA AND PATCHY         |               |
| ENHANCEMENT OF THE  SURROUNDING MARROW.   THE CONSTELLATION OF         |               |
| FINDINGS IS CONCERNING FOR INFECTION  VERSUS NEOPLASM SUCH AS          |               |
| METASTASIS OR MYELOMA.   TISSUE SAMPLING SHOULD BE  CONSIDERED.   BONE |               |
|  SCAN AND/OR SCREENING MRI OF THE MARROW MAY BE BENEFICIAL TO          |               |
| EVALUATE FOR THE PRESENCE OF OTHER LESIONS.   AN ADDITIONAL SMALL      |               |
| LESION IN THE  SUPERIOR POLE OF THE PATELLA DEMONSTRATES NO CONVINCING |               |
|  ENHANCEMENT OR ADJACENT  MARROW EDEMA AND IS DIFFICULT TO FURTHER     |               |
| CHARACTERIZE.   A NON-ENHANCING LESION  IN THE POSTERIOR ASPECT OF THE |               |
|  MEDIAL FEMORAL CONDYLE CORRESPONDS WITH A CHRONIC  SCLEROTIC LESION   |               |
| ON PREVIOUS RADIOGRAPHY.     2.   PROBABLE TEAR OF THE MEDIAL MENISCUS |               |
|  WITH ASYMMETRIC, GENERALIZED  CHONDROMALACIA IN THE MEDIAL            |               |
| FEMOROTIBIAL COMPARTMENT AND SMALL JOINT EFFUSION.     Dictated and    |               |
| Signed by: Garry Jensen MD    Electronically signed: 2018 9:21   |               |
| AM                                                                     |               |
+------------------------------------------------------------------------+---------------+
 
 
 
+------------------------------------------------------------------------------------------+
| Procedure Note                                                                           |
+------------------------------------------------------------------------------------------+
|   Darrell, Rad Results In - 2018  9:24 AM PDT  UNENHANCED AND ENHANCED MRI RIGHT KNEE  |
| 2018 7:48 AMCLINICAL HISTORY: Right Knee Pain- Further Evaluate the Tibial Lesion   |
| see on theXray    COMPARISON: Radiographs  and 2015 TECHNIQUE: The      |
| following 3T MR sequences of the right knee were obtained:1. Coronal T1 and axial T1     |
| with fat suppression.2. Axial proton density with fat-suppression and sagittal proton    |
| density withoutfat-suppression.3. Sagittal T2 with fat-suppression.  4. Coronal STIR.    |
| 5. Following the uneventful intravenous administration of 6 cc Gadavist, axial,sagittal  |
 
| and coronal fat suppressed T1 sequences were obtained.FINDINGS: A lobulated,             |
| circumscribed lesion is present peripherally in theanteromedial tibial plateau,          |
| measuring up to 1.2 x 0.8 x 1.5 cm in size, andcorresponding with the lytic lesion       |
| described on recent radiography.  The lesionclosely approximates the adjacent tibial     |
| cortex, which appears grosslymaintained.  The contents of the lesion are heterogeneously |
|  hyperintense onT2-weighted images, with some thin low signal internal septae suggested. |
|   Thelesion is homogeneously higher signal than the adjacent marrow on theunenhanced,    |
| fat-suppressed T1-weighted images, with smoothly marginatedenhancement present           |
| predominantly in the inferior aspect of the lesion followingcontrast administration.     |
| Edema and some patchy enhancement of the surroundingmarrow is also apparent.  An         |
| additional 10 mm rounded, fairly circumscribedmixed signal lesion is present in the      |
| subarticular, slightly posterior aspect ofthe medial femoral condyle and demonstrates no |
|  visible enhancement or signalalteration involving the adjacent marrow, and corresponds  |
| with a stable roundedsclerotic lesion visible on radiography dating back to .  An    |
| ovoid region ofincreased T2-weighted signal in the far superior pole of the patella      |
| measures upto 1.4 x 0.6 x 0.5 cm and demonstrates no convincing enhancement              |
| followingcontrast administration.  Marrow signal is normal elsewhere.  No fracture       |
| isevident.The anterior and posterior cruciate ligaments, medial collateral ligament      |
| andlateral collateral ligamentous complex appear intact and unremarkable, alongwith the  |
| patellar retinacula, patellar tendon and imaged quadriceps tendon. Localized signal      |
| alteration involving the free margin of the body and posteriorhorn of the medial         |
| meniscus is suspicious for meniscal tear.  The lateralmeniscus appears intact and        |
| unremarkable.  There is moderate, generalizedcartilage loss within the medial            |
| femorotibial joint compartment.  Cartilagewithin the lateral compartment is maintained.  |
|  There is mild to moderatecartilage loss involving the patella.  A small volume of joint |
|  fluid is present. No intra-articular loose body is identified.  Soft tissues about the  |
| knee areotherwise unremarkable.IMPRESSION -1.  1.5 CM CIRCUMSCRIBED, SEPTATED AND        |
| ECCENTRICALLY ENHANCING LESION IN THEANTEROMEDIAL ASPECT OF THE TIBIAL PLATEAU,          |
| CORRESPONDING WITH A LYTIC LESIONDESCRIBED ON RECENT RADIOGRAPHY, WITH EDEMA AND PATCHY  |
| ENHANCEMENT OF THESURROUNDING MARROW.  THE CONSTELLATION OF FINDINGS IS CONCERNING FOR   |
| INFECTIONVERSUS NEOPLASM SUCH AS METASTASIS OR MYELOMA.  TISSUE SAMPLING SHOULD          |
| BECONSIDERED.  BONE SCAN AND/OR SCREENING MRI OF THE MARROW MAY BE BENEFICIAL TOEVALUATE |
|  FOR THE PRESENCE OF OTHER LESIONS.  AN ADDITIONAL SMALL LESION IN THESUPERIOR POLE OF   |
| THE PATELLA DEMONSTRATES NO CONVINCING ENHANCEMENT OR ADJACENTMARROW EDEMA AND IS        |
| DIFFICULT TO FURTHER CHARACTERIZE.  A NON-ENHANCING LESIONIN THE POSTERIOR ASPECT OF THE |
|  MEDIAL FEMORAL CONDYLE CORRESPONDS WITH A CHRONICSCLEROTIC LESION ON PREVIOUS           |
| RADIOGRAPHY.2.  PROBABLE TEAR OF THE MEDIAL MENISCUS WITH ASYMMETRIC,                    |
| GENERALIZEDCHONDROMALACIA IN THE MEDIAL FEMOROTIBIAL COMPARTMENT AND SMALL JOINT         |
| EFFUSION.Dictated and Signed by: Garry Jensen MD  Electronically signed: 2018 9:21  |
| AM                                                                                       |
|                                                                                          |
|IMPRESSION -                                                                              |
|1.  1.5 CM CIRCUMSCRIBED, SEPTATED AND ECCENTRICALLY ENHANCING LESION IN THE              |
|ANTEROMEDIAL ASPECT OF THE TIBIAL PLATEAU, CORRESPONDING WITH A LYTIC LESION              |
|DESCRIBED ON RECENT RADIOGRAPHY, WITH EDEMA AND PATCHY ENHANCEMENT OF THE                 |
|SURROUNDING MARROW.  THE CONSTELLATION OF FINDINGS IS CONCERNING FOR INFECTION            |
|VERSUS NEOPLASM SUCH AS METASTASIS OR MYELOMA.  TISSUE SAMPLING SHOULD BE                 |
|CONSIDERED.  BONE SCAN AND/OR SCREENING MRI OF THE MARROW MAY BE BENEFICIAL TO           |
|EVALUATE FOR THE PRESENCE OF OTHER LESIONS.  AN ADDITIONAL SMALL LESION IN THE            |
|SUPERIOR POLE OF THE PATELLA DEMONSTRATES NO CONVINCING ENHANCEMENT OR ADJACENT           |
|MARROW EDEMA AND IS DIFFICULT TO FURTHER CHARACTERIZE.  A NON-ENHANCING LESION            |
|IN THE POSTERIOR ASPECT OF THE MEDIAL FEMORAL CONDYLE CORRESPONDS WITH A CHRONIC          |
|SCLEROTIC LESION ON PREVIOUS RADIOGRAPHY.                                                 |
|                                                                                          |
|2.  PROBABLE TEAR OF THE MEDIAL MENISCUS WITH ASYMMETRIC, GENERALIZED                     |
|CHONDROMALACIA IN THE MEDIAL FEMOROTIBIAL COMPARTMENT AND SMALL JOINT EFFUSION.           |
|                                                                                          |
|Dictated and Signed by: Garry Jensen MD                                                   |
| Electronically signed: 2018 9:21 AM                                                 |
+------------------------------------------------------------------------------------------+
 
 
 
 
+---------------+---------+--------------------+--------------+
| Performing    | Address | City/State/Zipcode | Phone Number |
| Organization  |         |                    |              |
+---------------+---------+--------------------+--------------+
|   [facility] |         |                    |              |
+---------------+---------+--------------------+--------------+
 documented in this encounter
 
 Visit Diagnoses
 
 
+-------------------------------------------------------------------------------------+
| Diagnosis                                                                           |
+-------------------------------------------------------------------------------------+
|   Right knee pain, unspecified chronicity                                           |
+-------------------------------------------------------------------------------------+
|   Primary osteoarthritis of right knee  Primary localized osteoarthrosis, lower leg |
+-------------------------------------------------------------------------------------+
 documented in this encounter
 
 Administered Medications
 
 
+-----------------------------------+--------+----------+-------+------+------+
| Medication Order                  | MAR    | Action   | Dose  | Rate | Site |
|                                   | Action | Date     |       |      |      |
+-----------------------------------+--------+----------+-------+------+------+
|   gadobutrol (GADAVIST) injection | Given  | 20 | 6 mLs |      |      |
|  6 mL  6 mL, Intravenous, ONCE    |        | 18  8:23 |       |      |      |
| PRN, Other, Starting Fri 18  |        |  AM PDT  |       |      |      |
| at 0823, For 1 dose, MRI          |        |          |       |      |      |
+-----------------------------------+--------+----------+-------+------+------+
 
 
 
+---+---+
|   |   |
+---+---+
 documented in this encounter

## 2020-09-21 NOTE — XMS
Encounter Summary
  Created on: 2020
 
 SayraSusana
 External Reference #: [id number]
 : 48
 Sex: Female
 
 Demographics
 
 
+-----------------------+---------------------------+
| Address               | [address]            |
|                       | [place]  [address] |
+-----------------------+---------------------------+
| Home Phone            | [phone]           |
+-----------------------+---------------------------+
| Preferred Language    | Unknown                   |
+-----------------------+---------------------------+
| Marital Status        |                    |
+-----------------------+---------------------------+
| Denominational Affiliation | [id number]                      |
+-----------------------+---------------------------+
| Race                  | White                     |
+-----------------------+---------------------------+
| Ethnic Group          | Not  or     |
+-----------------------+---------------------------+
 
 
 Author
 
 
+--------------+--------------------------------------------+
| Author       | [facility]  |
|              | [facility] [place]                                |
+--------------+--------------------------------------------+
| Organization | [facility]  |
|              | [facility] [place]                                |
+--------------+--------------------------------------------+
| Address      | Unknown                                    |
+--------------+--------------------------------------------+
| Phone        | Unavailable                                |
+--------------+--------------------------------------------+
 
 
 
 Support
 
 
+-------------+--------------+-------------------+-----------------+
| Name        | Relationship | Address           | Phone           |
+-------------+--------------+-------------------+-----------------+
| Eduar Colbert | ECON         | [address]       | [phone] |
|             |              | [place]   |                 |
|             |              | [id number]             |                 |
+-------------+--------------+-------------------+-----------------+
 
 
 
 
 Care Team Providers
 
 
+-------------------------+------+-----------------+
| Care Team Member Name   | Role | Phone           |
+-------------------------+------+-----------------+
| Tobin Alex MD | PCP  | [phone] |
+-------------------------+------+-----------------+
 
 
 
 Reason for Referral
 Diagnostic/Screening (Routine)
 
+--------+--------+-----------+--------------+--------------+---------------+
| Status | Reason | Specialty | Diagnoses /  | Referred By  | Referred To   |
|        |        |           | Procedures   | Contact      | Contact       |
+--------+--------+-----------+--------------+--------------+---------------+
| Closed |        | Radiology |   Diagnoses  |   Har[place],     |   [facility]     |
|        |        |           |  Weight      | Mahin CHARLES MD  | [address]  |
|        |        |           | loss,        |  [address]       |  [place], |
|        |        |           | unintentiona | [place], [place]  |  [place]           |
|        |        |           | l  Diarrhea, | [address]  [place]   | [address]    |
|        |        |           |  unspecified | [place]    | Phone:        |
|        |        |           |  type        | [id number]        | [phone]  |
|        |        |           | Procedures   | Phone:       |  Fax:         |
|        |        |           | MRI          | [phone] | [phone]  |
|        |        |           | Enterography |   Fax:       |               |
|        |        |           |  w wo        | [phone] |               |
|        |        |           | Contrast  ME |              |               |
|        |        |           |  MRI,        |              |               |
|        |        |           | ABDOMEN,     |              |               |
|        |        |           | COMBO  ME    |              |               |
|        |        |           | MRI, PELVIS, |              |               |
|        |        |           |  COMBO       |              |               |
+--------+--------+-----------+--------------+--------------+---------------+
 
 
 
 
 Reason for Visit
 Diagnostic/Screening (Routine)
 
+--------+--------+-----------+--------------+--------------+---------------+
| Status | Reason | Specialty | Diagnoses /  | Referred By  | Referred To   |
|        |        |           | Procedures   | Contact      | Contact       |
+--------+--------+-----------+--------------+--------------+---------------+
| Closed |        | Radiology |   Diagnoses  |   Harri,     |   [facility]     |
|        |        |           |  Weight      | Mahin CHARLES MD  | [address]  |
|        |        |           | loss,        |  [address]       |  [place], |
|        |        |           | unintentiona | [place], [place]  |  [place]           |
|        |        |           | l  Diarrhea, | [address]  [place]   | [address]    |
|        |        |           |  unspecified | [place]    | Phone:        |
|        |        |           |  type        | [address]        | [phone]  |
|        |        |           | Procedures   | Phone:       |  Fax:         |
|        |        |           | MRI          | [phone] | [phone]  |
|        |        |           | Enterography |   Fax:       |               |
|        |        |           |  w wo        | [phone] |               |
|        |        |           | Contrast  ME |              |               |
 
|        |        |           |  MRI,        |              |               |
|        |        |           | ABDOMEN,     |              |               |
|        |        |           | COMBO  ME    |              |               |
|        |        |           | MRI, PELVIS, |              |               |
|        |        |           |  COMBO       |              |               |
+--------+--------+-----------+--------------+--------------+---------------+
 
 
 
 
 Encounter Details
 
 
+--------+-----------+----------------------+----------------------+------------------+
| Date   | Type      | Department           | Care Team            | Description      |
+--------+-----------+----------------------+----------------------+------------------+
| / | Hospital  |   [facility] |   Mahin Leiva MD | Weight loss,     |
| 2018   | Encounter |  [facility]  [address]  |   [address], [place]  | unintentional;   |
|        |           | [address]  [place], | [address]  [place] | Diarrhea,        |
|        |           |  [place] [address]       |  [address]  [phone] | unspecified type |
|        |           | [phone]         |   [phone] (Fax) |                  |
|        |           |                      |   Nurse, [facility]     |                  |
+--------+-----------+----------------------+----------------------+------------------+
 
 
 
 Social History
 
 
+---------------+------------+-----------+--------+------------------+
| Tobacco Use   | Types      | Packs/Day | Years  | Date             |
|               |            |           | Used   |                  |
+---------------+------------+-----------+--------+------------------+
| Former Smoker | Cigarettes | 1.5       | 5      | Quit: 1979 |
+---------------+------------+-----------+--------+------------------+
 
 
 
+---------------------+---+---+---+
| Smokeless Tobacco:  |   |   |   |
| Never Used          |   |   |   |
+---------------------+---+---+---+
 
 
 
+-------------+----------------------+---------+------------------+
| Alcohol Use | Drinks/Week          | oz/Week | Comments         |
+-------------+----------------------+---------+------------------+
| Yes         |   1 Shots of liquor  | 1.0     | "social drinker" |
|             |  0 Standard drinks   |         |                  |
|             | or equivalent        |         |                  |
+-------------+----------------------+---------+------------------+
 
 
 
+------------------+---------------+
| Sex Assigned at  | Date Recorded |
| Birth            |               |
+------------------+---------------+
| Not on file      |               |
 
+------------------+---------------+
 documented as of this encounter
 
 Medications at Time of Discharge
 
 
+----------------------+----------------------+-----------+---------+----------+-----------+
| Medication           | Sig                  | Dispensed | Refills | Start    | End Date  |
|                      |                      |           |         | Date     |           |
+----------------------+----------------------+-----------+---------+----------+-----------+
|                      | Take 1,500 mg by     |           | 0       |          |           |
| Calcium-Magnesium-Vi | mouth.               |           |         |          |           |
| tamin D (CITRACAL    |                      |           |         |          |           |
| SLOW RELEASE)        |                      |           |         |          |           |
| 600-           |                      |           |         |          |           |
| MG-MG-UNIT TB24      |                      |           |         |          |           |
+----------------------+----------------------+-----------+---------+----------+-----------+
|   cholecalciferol    | Take 400 Units by    |           | 0       | 20 |           |
| (VITAMIN D-3) 400    | mouth Daily.         |           |         | 12       |           |
| units TABS           |                      |           |         |          |           |
+----------------------+----------------------+-----------+---------+----------+-----------+
|   ipratropium        | INSTILL TWO SPRAYS   |   15 mL   | 5       | 20 |           |
| (ATROVENT) 0.06%     | IN EACH NOSTRIL UP   |           |         | 17       |           |
| nasal spray          | TO FOUR TIMES DAILY  |           |         |          |           |
|                      | AS NEEDED            |           |         |          |           |
+----------------------+----------------------+-----------+---------+----------+-----------+
|   Polyethylene       | Apply  to eye Daily  |           | 0       |          |           |
| Glycol 400 (BLINK    | as needed.           |           |         |          |           |
| TEARS OP)            |                      |           |         |          |           |
+----------------------+----------------------+-----------+---------+----------+-----------+
|   estradiol          | Place 10 mcg         |           | 0       |          |  |
| (VAGIFEM) 10 mcg     | vaginally as needed. |           |         |          | 0         |
| vaginal tablet       |                      |           |         |          |           |
+----------------------+----------------------+-----------+---------+----------+-----------+
|   lovastatin         | Take 20 mg by mouth  |           | 0       |          |  |
| (MEVACOR) 20 mg      | nightly.             |           |         |          | 8         |
| tablet               |                      |           |         |          |           |
+----------------------+----------------------+-----------+---------+----------+-----------+
|   Lysine 500 MG CAPS | Take 500 mg by mouth |           | 0       |          |  |
|                      |  Daily.              |           |         |          | 0         |
+----------------------+----------------------+-----------+---------+----------+-----------+
|   metoprolol         | one tablet by mouth  |           | 1       | 20 |  |
| succinate            | daily                |           |         | 17       | 0         |
| (TOPROL-XL) 25 mg 24 |                      |           |         |          |           |
|  hr tablet           |                      |           |         |          |           |
+----------------------+----------------------+-----------+---------+----------+-----------+
|   mupirocin          | Apply topically to   |   22 g    | 1       | 20 |  |
| (BACTROBAN) 2%       | the nose as          |           |         | 15       | 0         |
| ointment             | directed.            |           |         |          |           |
+----------------------+----------------------+-----------+---------+----------+-----------+
|   Probiotic Product  | Take 1 capsule by    |           | 0       |          |  |
| (PROBIOTIC FORMULA)  | mouth as needed.     |           |         |          | 8         |
| capsule              |                      |           |         |          |           |
+----------------------+----------------------+-----------+---------+----------+-----------+
|   rivaroxaban        | Take 20 mg by mouth  |           | 0       |          |  |
| (XARELTO) 20 mg      | Daily.               |           |         |          | 0         |
| tablet               |                      |           |         |          |           |
+----------------------+----------------------+-----------+---------+----------+-----------+
|   sertraline         | 1 and 2 tablet by  |           | 0       | 20 |  |
| (ZOLOFT) 50 mg       | mouth daily          |           |         | 12       | 0         |
 
| tablet               |                      |           |         |          |           |
+----------------------+----------------------+-----------+---------+----------+-----------+
 documented as of this encounter
 
 Plan of Treatment
 
 
+--------+---------+-----------+----------------------+-------------+
| Date   | Type    | Specialty | Care Team            | Description |
+--------+---------+-----------+----------------------+-------------+
| 10/12/ | Office  | Neurology |   Jamin Brooks MD  [address] |             |
| 2020   | Visit   |           |  [address]      |             |
|        |         |           | [address]  [place],  |             |
|        |         |           | [place] [address]             |             |
|        |         |           | [phone]         |             |
|        |         |           | [phone] (Fax)   |             |
+--------+---------+-----------+----------------------+-------------+
 documented as of this encounter
 
 Procedures
 
 
+---------------------+--------+-------------+----------------------+----------------------+
| Procedure Name      | Priori | Date/Time   | Associated Diagnosis | Comments             |
|                     | ty     |             |                      |                      |
+---------------------+--------+-------------+----------------------+----------------------+
| MRI ENTEROGRAPHY W  | Routin | 2018  |   Weight loss,       |   Results for this   |
| WO CONTRAST         | e      | 10:34 AM    | unintentional        | procedure are in the |
|                     |        | PST         | Diarrhea,            |  results section.    |
|                     |        |             | unspecified type     |                      |
+---------------------+--------+-------------+----------------------+----------------------+
 documented in this encounter
 
 Results
 MRI Enterography w wo Contrast (2018 10:34 AM PST)
 
+----------+
| Specimen |
+----------+
|          |
+----------+
 
 
 
+------------------------------------------------------------------------+---------------+
| Narrative                                                              | Performed At  |
+------------------------------------------------------------------------+---------------+
|   TECHNIQUE: MRI abdomen, enterography protocol with sequences to      |   [facility] |
| include coronal  haste, axial T2, axial T1, coronal T1, and            |               |
| postcontrast coronal and axial  T1-weighted images.   7.5 mL Gadavist  |               |
| was administered intravenously.     CLINICAL INFORMATION: Diarrhea,    |               |
| weight loss     COMPARISON: None available.     FINDINGS:   IMAGE      |               |
| QUALITY:   Small bowel distension: Satisfactory.      BOWEL AND        |               |
| MESENTERY:  Peristalsis: Oral contrast is identified within the right  |               |
| colon.  Bowel wall thickening: None.   Enhancement: Normal.   Fistula: |               |
|  Absent.  Abscess: Absent.  Anal fissure: None.  Adenopathy: None.     |               |
| Mesentery: Normal appearance.  Stomach: Normal.   Duodenum: Normal.    |               |
| Colon: Normal.     OTHER:   Liver: Normal.   Gallbladder: Normal.      |               |
| Pancreas: Normal.   Spleen: Normal.   Adrenals glands: Normal.         |               |
| Kidneys: Nonenhancing bilateral renal cysts.  Reproductive organs:     |               |
 
| Mild prominent appearance of the endometrium.  Lung bases: Normal.     |               |
| Bones: Normal.        IMPRESSION -   No bowel abnormality identified.  |               |
|     Mild prominent appearance of the endometrium, recommend clinical   |               |
| correlation.   Consider pelvic sonogram for further assessment.        |               |
| Dictated and Signed by: Michele Yanez MD    Electronically signed:  |               |
| 3/2/2018 3:09 PM                                                       |               |
+------------------------------------------------------------------------+---------------+
 
 
 
+------------------------------------------------------------------------------------------+
| Procedure Note                                                                           |
+------------------------------------------------------------------------------------------+
|   Darrell, Rad Results In - 2018  3:12 PM PST  TECHNIQUE: MRI abdomen, enterography    |
| protocol with sequences to include coronalhaste, axial T2, axial T1, coronal T1, and     |
| postcontrast coronal and axialT1-weighted images.  7.5 mL Gadavist was administered      |
| intravenously.CLINICAL INFORMATION: Diarrhea, weight lossCOMPARISON: None                |
| available.FINDINGS: IMAGE QUALITY: Small bowel distension: Satisfactory. BOWEL AND       |
| MESENTERY:Peristalsis: Oral contrast is identified within the right colon.Bowel wall     |
| thickening: None. Enhancement: Normal. Fistula: Absent.Abscess: Absent.Anal fissure:     |
| None.Adenopathy: None.Mesentery: Normal appearance.Stomach: Normal. Duodenum: Normal.    |
| Colon: Normal. OTHER: Liver: Normal. Gallbladder: Normal. Pancreas: Normal. Spleen:      |
| Normal. Adrenals glands: Normal. Kidneys: Nonenhancing bilateral renal                   |
| cysts.Reproductive organs: Mild prominent appearance of the endometrium.Lung bases:      |
| Normal. Bones: Normal.IMPRESSION - No bowel abnormality identified.Mild prominent        |
| appearance of the endometrium, recommend clinical correlation. Consider pelvic sonogram  |
| for further assessment.Dictated and Signed by: Michele Yanez MD  Electronically        |
| signed: 3/2/2018 3:09 PM                                                                 |
|Enhancement: Normal.                                                                      |
|Fistula: Absent.                                                                          |
|Abscess: Absent.                                                                          |
|Anal fissure: None.                                                                       |
|Adenopathy: None.                                                                         |
|Mesentery: Normal appearance.                                                             |
|Stomach: Normal.                                                                          |
|Duodenum: Normal.                                                                         |
|Colon: Normal.                                                                            |
|                                                                                          |
|OTHER:                                                                                    |
|Liver: Normal.                                                                            |
|Gallbladder: Normal.                                                                      |
|Pancreas: Normal.                                                                         |
|Spleen: Normal.                                                                           |
|Adrenals glands: Normal.                                                                  |
|Kidneys: Nonenhancing bilateral renal cysts.                                              |
|Reproductive organs: Mild prominent appearance of the endometrium.                        |
|Lung bases: Normal.                                                                       |
|Bones: Normal.                                                                            |
|                                                                                          |
|                                                                                          |
|IMPRESSION -                                                                              |
|No bowel abnormality identified.                                                          |
|                                                                                          |
|Mild prominent appearance of the endometrium, recommend clinical correlation.             |
|Consider pelvic sonogram for further assessment.                                          |
|                                                                                          |
|Dictated and Signed by: Michele Yanez MD                                                |
| Electronically signed: 3/2/2018 3:09 PM                                                  |
+------------------------------------------------------------------------------------------+
 
 
 
 
+---------------+---------+--------------------+--------------+
| Performing    | Address | City/State/Zipcode | Phone Number |
| Organization  |         |                    |              |
+---------------+---------+--------------------+--------------+
|   [facility] |         |                    |              |
+---------------+---------+--------------------+--------------+
 documented in this encounter
 
 Visit Diagnoses
 
 
+----------------------------------------------+
| Diagnosis                                    |
+----------------------------------------------+
|   Weight loss, unintentional  Loss of weight |
+----------------------------------------------+
|   Diarrhea, unspecified type                 |
+----------------------------------------------+
 documented in this encounter
 
 Administered Medications
 
 
+-----------------------------------+--------+----------+---------+------+------+
| Medication Order                  | MAR    | Action   | Dose    | Rate | Site |
|                                   | Action | Date     |         |      |      |
+-----------------------------------+--------+----------+---------+------+------+
|   gadobutrol (GADAVIST) injection | Given  | 20 | 7.5 mLs |      |      |
|  7.5 mL  7.5 mL, Intravenous,     |        | 18 10:36 |         |      |      |
| ONCE PRN, Other, Starting Fri     |        |  AM PST  |         |      |      |
| 3/2/18 at 1035, For 1 dose, MRI   |        |          |         |      |      |
+-----------------------------------+--------+----------+---------+------+------+
 
 
 
+---+---+
|   |   |
+---+---+
 
 
 
+-----------------------------------+-------+----------+------+---+----------+
|   glucagon (GLUCAGEN) injection 1 | Given | 20 | 1 mg |   | Deltoid- |
|  mg  1 mg, Intramuscular, ONCE,   |       | 18 10:02 |      |   | Left     |
| Fri 3/2/18 at 1015, For 1 dose    |       |  AM PST  |      |   |          |
+-----------------------------------+-------+----------+------+---+----------+
 
 
 
+---+---+
|   |   |
+---+---+
 documented in this encounter

## 2020-09-21 NOTE — XMS
Encounter Summary
  Created on: 2020
 
 SayraSusana
 External Reference #: [id number]
 : 48
 Sex: Female
 
 Demographics
 
 
+-----------------------+---------------------------+
| Address               | [address]            |
|                       | [place]  [address] |
+-----------------------+---------------------------+
| Home Phone            | [phone]           |
+-----------------------+---------------------------+
| Preferred Language    | Unknown                   |
+-----------------------+---------------------------+
| Marital Status        |                    |
+-----------------------+---------------------------+
| Christianity Affiliation | 1073                      |
+-----------------------+---------------------------+
| Race                  | White                     |
+-----------------------+---------------------------+
| Ethnic Group          | Not  or     |
+-----------------------+---------------------------+
 
 
 Author
 
 
+--------------+--------------------------------------------+
| Author       | [facility]  |
|              | and [place]                                |
+--------------+--------------------------------------------+
| Organization | [facility]  |
|              | and [place]                                |
+--------------+--------------------------------------------+
| Address      | Unknown                                    |
+--------------+--------------------------------------------+
| Phone        | Unavailable                                |
+--------------+--------------------------------------------+
 
 
 
 Support
 
 
+-------------+--------------+-------------------+-----------------+
| Name        | Relationship | Address           | Phone           |
+-------------+--------------+-------------------+-----------------+
| Eduar Colbert | ECON         | [address]       | [phone] |
|             |              | [place]   |                 |
|             |              | [address]             |                 |
+-------------+--------------+-------------------+-----------------+
 
 
 
 
 Care Team Providers
 
 
+------------------------+------+-----------------+
| Care Team Member Name  | Role | Phone           |
+------------------------+------+-----------------+
| Álvaro Cisse MD | PCP  | [phone] |
+------------------------+------+-----------------+
 
 
 
 Reason for Visit
 
 
+-------------------+----------+
| Reason            | Comments |
+-------------------+----------+
| Medication Refill |          |
+-------------------+----------+
 
 
 
 Encounter Details
 
 
+--------+--------+---------------------+----------------------+-------------------+
| Date   | Type   | Department          | Care Team            | Description       |
+--------+--------+---------------------+----------------------+-------------------+
| / | Refill |   [facility]         |   Pablito Nava,   | Medication Refill |
|    |        | PHYSIATRY  [address]    | MD  [address]  |                   |
|        |        | [address]   |  [place]     |                   |
|        |        | [place]     | [address]  [phone]  |                   |
|        |        | [address]          |  [phone] (Fax)  |                   |
|        |        | [phone]        |                      |                   |
+--------+--------+---------------------+----------------------+-------------------+
 
 
 
 Social History
 
 
+---------------+-------+-----------+--------+------+
| Tobacco Use   | Types | Packs/Day | Years  | Date |
|               |       |           | Used   |      |
+---------------+-------+-----------+--------+------+
| Former Smoker |       |           |        |      |
+---------------+-------+-----------+--------+------+
 
 
 
+---------------------+---+---+---+
| Smokeless Tobacco:  |   |   |   |
| Never Used          |   |   |   |
+---------------------+---+---+---+
 
 
 
+------------------------+
| Comments: 10 years ago |
 
+------------------------+
 
 
 
+-------------+----------------------+---------+------------------+
| Alcohol Use | Drinks/Week          | oz/Week | Comments         |
+-------------+----------------------+---------+------------------+
| Yes         |   0 Standard drinks  | 0.0     | "social drinker" |
|             | or equivalent        |         |                  |
+-------------+----------------------+---------+------------------+
 
 
 
+------------------+---------------+
| Sex Assigned at  | Date Recorded |
| Birth            |               |
+------------------+---------------+
| Not on file      |               |
+------------------+---------------+
 documented as of this encounter
 
 Plan of Treatment
 
 
+--------+---------+-----------+----------------------+-------------+
| Date   | Type    | Specialty | Care Team            | Description |
+--------+---------+-----------+----------------------+-------------+
| 10/12/ | Office  | Neurology |   Jamin Brooks MD  [address] |             |
| 2020   | Visit   |           |  [address]      |             |
|        |         |           | [place]  |             |
|        |         |           | [place] [address]             |             |
|        |         |           | [phone]         |             |
|        |         |           | [phone] (Fax)   |             |
+--------+---------+-----------+----------------------+-------------+
 documented as of this encounter
 
 Visit Diagnoses
 Not on filedocumented in this encounter

## 2020-09-21 NOTE — XMS
Encounter Summary
  Created on: 2020
 
 SayraSusana
 External Reference #: [id number]
 : 48
 Sex: Female
 
 Demographics
 
 
+-----------------------+---------------------------+
| Address               | [address]            |
|                       | [place]  [address] |
+-----------------------+---------------------------+
| Home Phone            | [phone]           |
+-----------------------+---------------------------+
| Preferred Language    | Unknown                   |
+-----------------------+---------------------------+
| Marital Status        |                    |
+-----------------------+---------------------------+
| Muslim Affiliation | 1073                      |
+-----------------------+---------------------------+
| Race                  | White                     |
+-----------------------+---------------------------+
| Ethnic Group          | Not  or     |
+-----------------------+---------------------------+
 
 
 Author
 
 
+--------------+--------------------------------------------+
| Author       | [facility] [place]  |
|              | and [place]                                |
+--------------+--------------------------------------------+
| Organization | [facility] [place]  |
|              | and [place]                                |
+--------------+--------------------------------------------+
| Address      | Unknown                                    |
+--------------+--------------------------------------------+
| Phone        | Unavailable                                |
+--------------+--------------------------------------------+
 
 
 
 Support
 
 
+-------------+--------------+-------------------+-----------------+
| Name        | Relationship | Address           | Phone           |
+-------------+--------------+-------------------+-----------------+
| Eduar Colbert | ECON         | [address]       | [phone] |
|             |              | [place]   |                 |
|             |              | [address]             |                 |
+-------------+--------------+-------------------+-----------------+
 
 
 
 
 Care Team Providers
 
 
+-------------------------+------+-----------------+
| Care Team Member Name   | Role | Phone           |
+-------------------------+------+-----------------+
| Tobin Alex MD | PCP  | [phone] |
+-------------------------+------+-----------------+
 
 
 
 Encounter Details
 
 
+--------+-------------+----------------------+----------------------+-------------------+
| Date   | Type        | Department           | Care Team            | Description       |
+--------+-------------+----------------------+----------------------+-------------------+
| / | Orders Only |   [facility] [place]          |   Mahin Leiva MD | Diarrhea,         |
| 2017   |             | GASTROENTEROLOGY     |   [address]  | unspecified type  |
|        |             | [address]  | [address]  [place], [place] | (Primary Dx)      |
|        |             | [address]  [place], [place] |  [address]  [phone] |                   |
|        |             |  [address]          |   [phone] (Fax) |                   |
|        |             | [phone]         |                      |                   |
+--------+-------------+----------------------+----------------------+-------------------+
 
 
 
 Social History
 
 
+---------------+------------+-----------+--------+------------------+
| Tobacco Use   | Types      | Packs/Day | Years  | Date             |
|               |            |           | Used   |                  |
+---------------+------------+-----------+--------+------------------+
| Former Smoker | Cigarettes | 1.5       | 5      | Quit: 1979 |
+---------------+------------+-----------+--------+------------------+
 
 
 
+---------------------+---+---+---+
| Smokeless Tobacco:  |   |   |   |
| Never Used          |   |   |   |
+---------------------+---+---+---+
 
 
 
+-------------+----------------------+---------+------------------+
| Alcohol Use | Drinks/Week          | oz/Week | Comments         |
+-------------+----------------------+---------+------------------+
| Yes         |   1 Shots of liquor  | 1.0     | "social drinker" |
|             |  0 Standard drinks   |         |                  |
|             | or equivalent        |         |                  |
+-------------+----------------------+---------+------------------+
 
 
 
+------------------+---------------+
| Sex Assigned at  | Date Recorded |
| Birth            |               |
 
+------------------+---------------+
| Not on file      |               |
+------------------+---------------+
 documented as of this encounter
 
 Progress Notes
 Nusrat Bustillos RN - 2017  4:28 PM PDTPer Dr. Leiva if diarrhea continues he re
commends stool studies be done.  Orders mailed to patient so she can have done in [place].
Electronically signed by Nusrat Bustillos RN at 2017  4:40 PM PDTdocumented in thi
s encounter
 
 Plan of Treatment
 
 
+--------+---------+-----------+----------------------+-------------+
| Date   | Type    | Specialty | Care Team            | Description |
+--------+---------+-----------+----------------------+-------------+
| 10/12/ | Office  | Neurology |   Jamin Brooks MD  [address] |             |
|    | Visit   |           |  [address]      |             |
|        |         |           | [place],  |             |
|        |         |           | [place] [address]             |             |
|        |         |           | [phone]         |             |
|        |         |           | [phone] (Fax)   |             |
+--------+---------+-----------+----------------------+-------------+
 
 
 
+--------------------+-------------+--------+----------------------+----------------------+
| Name               | Type        | Priori | Associated Diagnoses | Order Schedule       |
|                    |             | ty     |                      |                      |
+--------------------+-------------+--------+----------------------+----------------------+
| Culture, Stool     | Microbiolog | Routin |   Diarrhea,          | 1 Occurrences        |
|                    | y           | e      | unspecified type     | starting 2017  |
|                    |             |        |                      | until 2018     |
+--------------------+-------------+--------+----------------------+----------------------+
| Giardia Ag, EIA,   | Microbiolog | Routin |   Diarrhea,          | 1 Occurrences        |
| Stool              | y           | e      | unspecified type     | starting 2017  |
|                    |             |        |                      | until 2018     |
+--------------------+-------------+--------+----------------------+----------------------+
| Cryptosporidium Ag | Microbiolog | Routin |   Diarrhea,          | 1 Occurrences        |
|                    | y           | e      | unspecified type     | starting 2017  |
|                    |             |        |                      | until 2018     |
+--------------------+-------------+--------+----------------------+----------------------+
| Clostridium        | Microbiolog | Routin |   Diarrhea,          | 1 Occurrences        |
| difficile Toxin    | y           | e      | unspecified type     | starting 2017  |
|                    |             |        |                      | until 2018     |
+--------------------+-------------+--------+----------------------+----------------------+
| Clostridium        | Microbiolog | Routin |   Diarrhea,          | Expected: 2017 |
| difficile A and B  | y           | e      | unspecified type     |  (Approximate),      |
| EIA                |             |        |                      | Expires: 2017  |
+--------------------+-------------+--------+----------------------+----------------------+
 documented as of this encounter
 
 Visit Diagnoses
 
 
+----------------------------------------+
| Diagnosis                              |
+----------------------------------------+
|   Diarrhea, unspecified type - Primary |
 
+----------------------------------------+
 documented in this encounter

## 2020-09-21 NOTE — XMS
Encounter Summary
  Created on: 2020
 
 SayraSusana
 External Reference #: [id number]
 : 48
 Sex: Female
 
 Demographics
 
 
+-----------------------+---------------------------+
| Address               | [address]            |
|                       | [place]  [address] |
+-----------------------+---------------------------+
| Home Phone            | [phone]           |
+-----------------------+---------------------------+
| Preferred Language    | Unknown                   |
+-----------------------+---------------------------+
| Marital Status        |                    |
+-----------------------+---------------------------+
| Episcopal Affiliation | 1073                      |
+-----------------------+---------------------------+
| Race                  | White                     |
+-----------------------+---------------------------+
| Ethnic Group          | Not  or     |
+-----------------------+---------------------------+
 
 
 Author
 
 
+--------------+--------------------------------------------+
| Author       | [facility] [place]  |
|              | and [place]                                |
+--------------+--------------------------------------------+
| Organization | [facility] [place]  |
|              | and [place]                                |
+--------------+--------------------------------------------+
| Address      | Unknown                                    |
+--------------+--------------------------------------------+
| Phone        | Unavailable                                |
+--------------+--------------------------------------------+
 
 
 
 Support
 
 
+-------------+--------------+-------------------+-----------------+
| Name        | Relationship | Address           | Phone           |
+-------------+--------------+-------------------+-----------------+
| Eduar Colbert | ECON         | [address]       | [phone] |
|             |              | [place]   |                 |
|             |              | [address]             |                 |
+-------------+--------------+-------------------+-----------------+
 
 
 
 
 Care Team Providers
 
 
+-------------------------+------+-----------------+
| Care Team Member Name   | Role | Phone           |
+-------------------------+------+-----------------+
| Tobin Alex MD | PCP  | [phone] |
+-------------------------+------+-----------------+
 
 
 
 Reason for Visit
 
 
+--------+--------+------------------------+
| Reason | Onset  | Comments               |
|        | Date   |                        |
+--------+--------+------------------------+
| Other  | / | Update on her symptoms |
|        |    |                        |
+--------+--------+------------------------+
 
 
 
 Encounter Details
 
 
+--------+-----------+----------------------+----------------------+----------------------+
| Date   | Type      | Department           | Care Team            | Description          |
+--------+-----------+----------------------+----------------------+----------------------+
| / | Telephone |   [facility]          |   Mahin Leiva MD | Other (Update on her |
| 2017   |           | GASTROENTEROLOGY     |   [address]  |  symptoms)           |
|        |           | [address]  | [address]  [place] |                      |
|        |           | [address]  [place] |  [address] [phone] |                      |
|        |           |  [address]          |   [phone] (Fax) |                      |
|        |           | [phone]         |                      |                      |
+--------+-----------+----------------------+----------------------+----------------------+
 
 
 
 Social History
 
 
+---------------+------------+-----------+--------+------------------+
| Tobacco Use   | Types      | Packs/Day | Years  | Date             |
|               |            |           | Used   |                  |
+---------------+------------+-----------+--------+------------------+
| Former Smoker | Cigarettes | 1.5       | 5      | Quit: 1979 |
+---------------+------------+-----------+--------+------------------+
 
 
 
+---------------------+---+---+---+
| Smokeless Tobacco:  |   |   |   |
| Never Used          |   |   |   |
+---------------------+---+---+---+
 
 
 
 
+-------------+----------------------+---------+------------------+
| Alcohol Use | Drinks/Week          | oz/Week | Comments         |
+-------------+----------------------+---------+------------------+
| Yes         |   1 Shots of liquor  | 1.0     | "social drinker" |
|             |  0 Standard drinks   |         |                  |
|             | or equivalent        |         |                  |
+-------------+----------------------+---------+------------------+
 
 
 
+------------------+---------------+
| Sex Assigned at  | Date Recorded |
| Birth            |               |
+------------------+---------------+
| Not on file      |               |
+------------------+---------------+
 documented as of this encounter
 
 Miscellaneous Notes
 Telephone Encounter - Nusrat Bustillos RN - 2017  4:37 PM PDTSpoke with patient 
and she states she is doing much better.  Stools are much more normal now.  She appreciated 
the call.Electronically signed by Nusrat Bustillos RN at 2017  4:38 PM PDTdocument
ed in this encounter
 
 Plan of Treatment
 
 
+--------+---------+-----------+----------------------+-------------+
| Date   | Type    | Specialty | Care Team            | Description |
+--------+---------+-----------+----------------------+-------------+
| 10/12/ | Office  | Neurology |   Jamin Brooks MD  [address] |             |
|    | Visit   |           |  [address]      |             |
|        |         |           | [place],  |             |
|        |         |           | [place] [address]             |             |
|        |         |           | [phone]         |             |
|        |         |           | [phone] (Fax)   |             |
+--------+---------+-----------+----------------------+-------------+
 documented as of this encounter
 
 Visit Diagnoses
 Not on filedocumented in this encounter

## 2020-09-21 NOTE — XMS
Encounter Summary
  Created on: 2020
 
 SayraSusana
 External Reference #: [id number]
 : 48
 Sex: Female
 
 Demographics
 
 
+-----------------------+---------------------------+
| Address               | [address]            |
|                       | [place]  [address] |
+-----------------------+---------------------------+
| Home Phone            | [phone]           |
+-----------------------+---------------------------+
| Preferred Language    | Unknown                   |
+-----------------------+---------------------------+
| Marital Status        |                    |
+-----------------------+---------------------------+
| Hinduism Affiliation | 1073                      |
+-----------------------+---------------------------+
| Race                  | White                     |
+-----------------------+---------------------------+
| Ethnic Group          | Not  or     |
+-----------------------+---------------------------+
 
 
 Author
 
 
+--------------+--------------------------------------------+
| Author       | [facility]  |
|              | and [place]                                |
+--------------+--------------------------------------------+
| Organization | [facility]  |
|              | and [place]                                |
+--------------+--------------------------------------------+
| Address      | Unknown                                    |
+--------------+--------------------------------------------+
| Phone        | Unavailable                                |
+--------------+--------------------------------------------+
 
 
 
 Support
 
 
+-------------+--------------+-------------------+-----------------+
| Name        | Relationship | Address           | Phone           |
+-------------+--------------+-------------------+-----------------+
| Eduar Colbert | ECON         | [address]       | [phone] |
|             |              | [place]   |                 |
|             |              | [address]             |                 |
+-------------+--------------+-------------------+-----------------+
 
 
 
 
 Care Team Providers
 
 
+-------------------------+------+-----------------+
| Care Team Member Name   | Role | Phone           |
+-------------------------+------+-----------------+
| Tobin Alex MD | PCP  | [phone] |
+-------------------------+------+-----------------+
 
 
 
 Reason for Visit
 
 
+--------------+--------+-----------------------------------------------------+
| Reason       | Onset  | Comments                                            |
|              | Date   |                                                     |
+--------------+--------+-----------------------------------------------------+
| Imaging Only | / | patient wants to know if she should be NPO for her  |
|              |    | Enterography tomorrow                               |
+--------------+--------+-----------------------------------------------------+
 
 
 
 Encounter Details
 
 
+--------+-----------+----------------------+----------------------+---------------------+
| Date   | Type      | Department           | Care Team            | Description         |
+--------+-----------+----------------------+----------------------+---------------------+
| / | Telephone |   [facility]          |   Mahin Leiva MD | Imaging Only        |
|    |           | GASTROENTEROLOGY     |   [address] [place]  | (patient wants to   |
|        |           | [address] [place]  | [address]  [place], [place] | know if she should  |
|        |           | [address]  [place], [place] |  [address] [phone] | be NPO for her      |
|        |           |  [address]          |   [phone] (Fax) | Enterography        |
|        |           | [phone]         |                      | tomorrow)           |
+--------+-----------+----------------------+----------------------+---------------------+
 
 
 
 Social History
 
 
+---------------+------------+-----------+--------+------------------+
| Tobacco Use   | Types      | Packs/Day | Years  | Date             |
|               |            |           | Used   |                  |
+---------------+------------+-----------+--------+------------------+
| Former Smoker | Cigarettes | 1.5       | 5      | Quit: 1979 |
+---------------+------------+-----------+--------+------------------+
 
 
 
+---------------------+---+---+---+
| Smokeless Tobacco:  |   |   |   |
| Never Used          |   |   |   |
+---------------------+---+---+---+
 
 
 
 
+-------------+----------------------+---------+------------------+
| Alcohol Use | Drinks/Week          | oz/Week | Comments         |
+-------------+----------------------+---------+------------------+
| Yes         |   1 Shots of liquor  | 1.0     | "social drinker" |
|             |  0 Standard drinks   |         |                  |
|             | or equivalent        |         |                  |
+-------------+----------------------+---------+------------------+
 
 
 
+------------------+---------------+
| Sex Assigned at  | Date Recorded |
| Birth            |               |
+------------------+---------------+
| Not on file      |               |
+------------------+---------------+
 documented as of this encounter
 
 Miscellaneous Notes
 Telephone Encounter - Celeste Tenorio CMA - 2018  3:30 PM PSTPatient calls in, state
s she has an imaging appointment tomorrow at 0800 and should she be NPO after midnight, info
rmed her yes.Electronically signed by Celeste Tenorio CMA at 2018  3:31 PM PSTdocument
ed in this encounter
 
 Plan of Treatment
 
 
+--------+---------+-----------+----------------------+-------------+
| Date   | Type    | Specialty | Care Team            | Description |
+--------+---------+-----------+----------------------+-------------+
| 10/12/ | Office  | Neurology |   Jamin Brooks MD  [address] |             |
| 2020   | [facility]   |           [facility]  [facility]      |             |
|        |         |           | [place]  |             |
|        |         |           | [place] [address]             |             |
|        |         |           | [phone]         |             |
|        |         |           | [phone] (Fax)   |             |
+--------+---------+-----------+----------------------+-------------+
 documented as of this encounter
 
 Visit Diagnoses
 Not on filedocumented in this encounter

## 2020-09-21 NOTE — XMS
Encounter Summary
  Created on: 2020
 
 SayraSusana
 External Reference #: [id number]
 : 48
 Sex: Female
 
 Demographics
 
 
+-----------------------+---------------------------+
| Address               | [address]            |
|                       | [place]  [address] |
+-----------------------+---------------------------+
| Home Phone            | [phone]           |
+-----------------------+---------------------------+
| Preferred Language    | Unknown                   |
+-----------------------+---------------------------+
| Marital Status        |                    |
+-----------------------+---------------------------+
| Congregation Affiliation | 1073                      |
+-----------------------+---------------------------+
| Race                  | White                     |
+-----------------------+---------------------------+
| Ethnic Group          | Not  or     |
+-----------------------+---------------------------+
 
 
 Author
 
 
+--------------+--------------------------------------------+
| Author       | [facility]  |
|              | and [place]                                |
+--------------+--------------------------------------------+
| Organization | [facility]  |
|              | and [place]                                |
+--------------+--------------------------------------------+
| Address      | Unknown                                    |
+--------------+--------------------------------------------+
| Phone        | Unavailable                                |
+--------------+--------------------------------------------+
 
 
 
 Support
 
 
+-------------+--------------+-------------------+-----------------+
| Name        | Relationship | Address           | Phone           |
+-------------+--------------+-------------------+-----------------+
| Eduar Colbert | ECON         | [address]       | [phone] |
|             |              | [place]   |                 |
|             |              | [address]             |                 |
+-------------+--------------+-------------------+-----------------+
 
 
 
 
 Care Team Providers
 
 
+-------------------------+------+-----------------+
| Care Team Member Name   | Role | Phone           |
+-------------------------+------+-----------------+
| Tobin Alex MD | PCP  | [phone] |
+-------------------------+------+-----------------+
 
 
 
 Encounter Details
 
 
+--------+----------+----------------------+----------------------+----------------------+
| Date   | Type     | Department           | Care Team            | Description          |
+--------+----------+----------------------+----------------------+----------------------+
| / | Abstract |   [facility]          |   Pablito Nava MD  | Anomalous            |
|    |          | OTOLARYNGOLOGY  [address]  |  [address]  [place] | atrioventricular     |
|        |          | [address] [address]  |  [address]  [address]  | excitation (Primary  |
|        |          |  [place]     | [address]  [phone]  | Dx); Depression with |
|        |          | [address]           |  [phone] (Fax)  |  anxiety;            |
|        |          | [phone]         |                      | Fibrillation, atrial |
|        |          |                      |                      |  (HCC); HTN          |
|        |          |                      |                      | (hypertension);      |
|        |          |                      |                      | Hyperlipidemia;      |
|        |          |                      |                      | Internal derangement |
|        |          |                      |                      |  of knee,            |
|        |          |                      |                      | unspecified          |
|        |          |                      |                      | laterality;          |
|        |          |                      |                      | Osteopenia;          |
|        |          |                      |                      | Postmenopausal       |
|        |          |                      |                      | disorder;            |
|        |          |                      |                      | Unspecified          |
|        |          |                      |                      | essential            |
|        |          |                      |                      | hypertension;        |
|        |          |                      |                      | Disorder of bone and |
|        |          |                      |                      |  cartilage,          |
|        |          |                      |                      | unspecified;         |
|        |          |                      |                      | Unspecified internal |
|        |          |                      |                      |  derangement of      |
|        |          |                      |                      | knee; Other and      |
|        |          |                      |                      | unspecified          |
|        |          |                      |                      | hyperlipidemia;      |
|        |          |                      |                      | Atrial fibrillation  |
|        |          |                      |                      | (HCC); Dysthymic     |
|        |          |                      |                      | disorder; Esophageal |
|        |          |                      |                      |  reflux              |
+--------+----------+----------------------+----------------------+----------------------+
 
 
 
 Social History
 
 
+---------------+-------+-----------+--------+------+
| Tobacco Use   | Types | Packs/Day | Years  | Date |
|               |       |           | Used   |      |
+---------------+-------+-----------+--------+------+
 
| Former Smoker |       |           |        |      |
+---------------+-------+-----------+--------+------+
 
 
 
+------------------------+
| Comments: 10 years ago |
+------------------------+
 
 
 
+-------------+-------------+---------+----------+
| Alcohol Use | Drinks/Week | oz/Week | Comments |
+-------------+-------------+---------+----------+
| Not Asked   |             |         |          |
+-------------+-------------+---------+----------+
 
 
 
+------------------+---------------+
| Sex Assigned at  | Date Recorded |
| Birth            |               |
+------------------+---------------+
| Not on file      |               |
+------------------+---------------+
 documented as of this encounter
 
 Plan of Treatment
 
 
+--------+---------+-----------+----------------------+-------------+
| Date   | Type    | Specialty | Care Team            | Description |
+--------+---------+-----------+----------------------+-------------+
| 10/12/ | Office  | Neurology |   Jamin Brooks MD  [address] |             |
| 2020   | Visit   |           |  Fracture      |             |
|        |         |           | [address]  [place]  |             |
|        |         |           | [place] [address]             |             |
|        |         |           | [phone]         |             |
|        |         |           | [phone] (Fax)   |             |
+--------+---------+-----------+----------------------+-------------+
 documented as of this encounter
 
 Visit Diagnoses
 
 
+-------------------------------------------------------------------------------+
| Diagnosis                                                                     |
+-------------------------------------------------------------------------------+
|   Anomalous atrioventricular excitation - Primary                             |
+-------------------------------------------------------------------------------+
|   Depression with anxiety  Dysthymic disorder                                 |
+-------------------------------------------------------------------------------+
|   Fibrillation, atrial (HCC)                                                  |
+-------------------------------------------------------------------------------+
|   HTN (hypertension)  Unspecified essential hypertension                      |
+-------------------------------------------------------------------------------+
|   Hyperlipidemia  Other and unspecified hyperlipidemia                        |
+-------------------------------------------------------------------------------+
|   Internal derangement of knee, unspecified laterality                        |
+-------------------------------------------------------------------------------+
 
|   Osteopenia  Disorder of bone and cartilage, unspecified                     |
+-------------------------------------------------------------------------------+
|   Postmenopausal disorder  Unspecified menopausal and postmenopausal disorder |
+-------------------------------------------------------------------------------+
|   Unspecified essential hypertension                                          |
+-------------------------------------------------------------------------------+
|   Disorder of bone and cartilage, unspecified                                 |
+-------------------------------------------------------------------------------+
|   Other and unspecified hyperlipidemia                                        |
+-------------------------------------------------------------------------------+
|   Atrial fibrillation (HCC)  Atrial fibrillation                              |
+-------------------------------------------------------------------------------+
|   Dysthymic disorder                                                          |
+-------------------------------------------------------------------------------+
|   Esophageal reflux                                                           |
+-------------------------------------------------------------------------------+
 documented in this encounter

## 2020-09-21 NOTE — XMS
Encounter Summary
  Created on: 2020
 
 SayraSusana
 External Reference #: [id number]
 : 48
 Sex: Female
 
 Demographics
 
 
+-----------------------+---------------------------+
| Address               | [address]            |
|                       | [place]  [address] |
+-----------------------+---------------------------+
| Home Phone            | [phone]           |
+-----------------------+---------------------------+
| Preferred Language    | Unknown                   |
+-----------------------+---------------------------+
| Marital Status        |                    |
+-----------------------+---------------------------+
| Sabianism Affiliation | 1073                      |
+-----------------------+---------------------------+
| Race                  | White                     |
+-----------------------+---------------------------+
| Ethnic Group          | Not  or     |
+-----------------------+---------------------------+
 
 
 Author
 
 
+--------------+--------------------------------------------+
| Author       | [facility]  |
|              | and [place]                                |
+--------------+--------------------------------------------+
| Organization | [facility]  |
|              | and [place]                                |
+--------------+--------------------------------------------+
| Address      | Unknown                                    |
+--------------+--------------------------------------------+
| Phone        | Unavailable                                |
+--------------+--------------------------------------------+
 
 
 
 Support
 
 
+-------------+--------------+-------------------+-----------------+
| Name        | Relationship | Address           | Phone           |
+-------------+--------------+-------------------+-----------------+
| Eduar Colbert | ECON         | [address]       | [phone] |
|             |              | [place]   |                 |
|             |              | [address]             |                 |
+-------------+--------------+-------------------+-----------------+
 
 
 
 
 Care Team Providers
 
 
+-------------------------+------+-----------------+
| Care Team Member Name   | Role | Phone           |
+-------------------------+------+-----------------+
| Tobin Alex MD | PCP  | [phone] |
+-------------------------+------+-----------------+
 
 
 
 Reason for Visit
 
 
+----------+--------+----------+
| Reason   | Onset  | Comments |
|          | Date   |          |
+----------+--------+----------+
| Diarrhea | / |          |
|          |    |          |
+----------+--------+----------+
 
 
 
 Encounter Details
 
 
+--------+-----------+----------------------+----------------------+-------------+
| Date   | Type      | Department           | Care Team            | Description |
+--------+-----------+----------------------+----------------------+-------------+
| / | Telephone |   [facility]          |   Mahin Leiva MD | Diarrhea    |
| 2017   |           | GASTROENTEROLOGY     |   [address], [place]  |             |
|        |           | [address] [place]  | [address]  [place], [place] |             |
|        |           | [address]  [place], [place] |  [address]  [phone] |             |
|        |           |  [address]          |   [phone] (Fax) |             |
|        |           | [phone]         |                      |             |
+--------+-----------+----------------------+----------------------+-------------+
 
 
 
 Social History
 
 
+---------------+------------+-----------+--------+------------------+
| Tobacco Use   | Types      | Packs/Day | Years  | Date             |
|               |            |           | Used   |                  |
+---------------+------------+-----------+--------+------------------+
| Former Smoker | Cigarettes | 1.5       | 5      | Quit: 1979 |
+---------------+------------+-----------+--------+------------------+
 
 
 
+---------------------+---+---+---+
| Smokeless Tobacco:  |   |   |   |
| Never Used          |   |   |   |
+---------------------+---+---+---+
 
 
 
 
+-------------+----------------------+---------+------------------+
| Alcohol Use | Drinks/Week          | oz/Week | Comments         |
+-------------+----------------------+---------+------------------+
| Yes         |   1 Shots of liquor  | 1.0     | "social drinker" |
|             |  0 Standard drinks   |         |                  |
|             | or equivalent        |         |                  |
+-------------+----------------------+---------+------------------+
 
 
 
+------------------+---------------+
| Sex Assigned at  | Date Recorded |
| Birth            |               |
+------------------+---------------+
| Not on file      |               |
+------------------+---------------+
 documented as of this encounter
 
 Miscellaneous Notes
 Telephone Encounter - Nusrat Bustillos RN - 2017 10:10 AM PDTPatient states they
 returned home from [place] last Friday.  She took Azithromycin for 3 days to treat the Camp
ylobacter and she has seen a slow down of her diarrhea. Has one or two loose stools about ev
carlos other day.  Has started taking a probiotic called colon health by [employer].  She will be
 traveling to [place] next week and will give us an update before she leaves [facility].Darlene
ctronically signed by Nusrat Bustillos RN at 2017 10:15 AM PDTdocumented in this e
ncounter
 
 Plan of Treatment
 
 
+--------+---------+-----------+----------------------+-------------+
| Date   | Type    | Specialty | Care Team            | Description |
+--------+---------+-----------+----------------------+-------------+
| 10/12/ | Office  | Neurology |   Jamin Brooks MD  [address] |             |
|    | Visit   |           |  [facility]      |             |
|        |         |           | [place],  |             |
|        |         |           | [place] [address]             |             |
|        |         |           | [phone]         |             |
|        |         |           | [phone] (Fax)   |             |
+--------+---------+-----------+----------------------+-------------+
 documented as of this encounter
 
 Visit Diagnoses
 Not on filedocumented in this encounter

## 2020-09-21 NOTE — XMS
Encounter Summary
  Created on: 2020
 
 SayraSusana
 External Reference #: [id number]
 : 48
 Sex: Female
 
 Demographics
 
 
+-----------------------+---------------------------+
| Address               | [address]            |
|                       | [place]  [address] |
+-----------------------+---------------------------+
| Home Phone            | [phone]           |
+-----------------------+---------------------------+
| Preferred Language    | Unknown                   |
+-----------------------+---------------------------+
| Marital Status        |                    |
+-----------------------+---------------------------+
| Denominational Affiliation | [id number]                      |
+-----------------------+---------------------------+
| Race                  | White                     |
+-----------------------+---------------------------+
| Ethnic Group          | Not  or     |
+-----------------------+---------------------------+
 
 
 Author
 
 
+--------------+--------------------------------------------+
| Author       | [facility]  |
|              | and [place]                                |
+--------------+--------------------------------------------+
| Organization | [facility]  |
|              | and [place]                                |
+--------------+--------------------------------------------+
| Address      | Unknown                                    |
+--------------+--------------------------------------------+
| Phone        | Unavailable                                |
+--------------+--------------------------------------------+
 
 
 
 Support
 
 
+-------------+--------------+-------------------+-----------------+
| Name        | Relationship | Address           | Phone           |
+-------------+--------------+-------------------+-----------------+
| Eduar Colbert | ECON         | [address]       | [phone] |
|             |              | [place]   |                 |
|             |              | [address]             |                 |
+-------------+--------------+-------------------+-----------------+
 
 
 
 
 Care Team Providers
 
 
+------------------------+------+-----------------+
| Care Team Member Name  | Role | Phone           |
+------------------------+------+-----------------+
| Álvaro Cisse MD | PCP  | [phone] |
+------------------------+------+-----------------+
 
 
 
 Reason for Visit
 
 
+-------------------+----------+
| Reason            | Comments |
+-------------------+----------+
| Medication Refill |          |
+-------------------+----------+
 
 
 
 Encounter Details
 
 
+--------+--------+---------------------+----------------------+-------------------+
| Date   | Type   | Department          | Care Team            | Description       |
+--------+--------+---------------------+----------------------+-------------------+
| / | Refill |   [facility]         |   Pablito Nava,   | Medication Refill |
|    |        | PHYSIATRY  [address]    | MD  [address]  |                   |
|        |        | [address]   |  [place], [place]     |                   |
|        |        | [place]     | [address]  [phone]  |                   |
|        |        | [address]          |  [phone] (Fax)  |                   |
|        |        | [phone]        |                      |                   |
+--------+--------+---------------------+----------------------+-------------------+
 
 
 
 Social History
 
 
+---------------+-------+-----------+--------+------+
| Tobacco Use   | Types | Packs/Day | Years  | Date |
|               |       |           | Used   |      |
+---------------+-------+-----------+--------+------+
| Former Smoker |       |           |        |      |
+---------------+-------+-----------+--------+------+
 
 
 
+---------------------+---+---+---+
| Smokeless Tobacco:  |   |   |   |
| Never Used          |   |   |   |
+---------------------+---+---+---+
 
 
 
+------------------------+
| Comments: 10 years ago |
 
+------------------------+
 
 
 
+-------------+----------------------+---------+------------------+
| Alcohol Use | Drinks/Week          | oz/Week | Comments         |
+-------------+----------------------+---------+------------------+
| Yes         |   0 Standard drinks  | 0.0     | "social drinker" |
|             | or equivalent        |         |                  |
+-------------+----------------------+---------+------------------+
 
 
 
+------------------+---------------+
| Sex Assigned at  | Date Recorded |
| Birth            |               |
+------------------+---------------+
| Not on file      |               |
+------------------+---------------+
 documented as of this encounter
 
 Plan of Treatment
 
 
+--------+---------+-----------+----------------------+-------------+
| Date   | Type    | Specialty | Care Team            | Description |
+--------+---------+-----------+----------------------+-------------+
| 10/12/ | Office  | Neurology |   Jamin Brooks MD  [address] |             |
| 2020   | Visit   |           |  [address]      |             |
|        |         |           | [place]  |             |
|        |         |           | [place] [address]             |             |
|        |         |           | [phone]         |             |
|        |         |           | [phone] (Fax)   |             |
+--------+---------+-----------+----------------------+-------------+
 documented as of this encounter
 
 Visit Diagnoses
 Not on filedocumented in this encounter

## 2020-09-21 NOTE — XMS
Encounter Summary
  Created on: 2020
 
 SayraSusana
 External Reference #: [id number]
 : 48
 Sex: Female
 
 Demographics
 
 
+-----------------------+---------------------------+
| Address               | [address]            |
|                       | [place]  [address] |
+-----------------------+---------------------------+
| Home Phone            | [phone]           |
+-----------------------+---------------------------+
| Preferred Language    | Unknown                   |
+-----------------------+---------------------------+
| Marital Status        |                    |
+-----------------------+---------------------------+
| Yazidism Affiliation | [id number]                      |
+-----------------------+---------------------------+
| Race                  | White                     |
+-----------------------+---------------------------+
| Ethnic Group          | Not  or     |
+-----------------------+---------------------------+
 
 
 Author
 
 
+--------------+--------------------------------------------+
| Author       | [facility]  |
|              | and [place]                                |
+--------------+--------------------------------------------+
| Organization | [facility]  |
|              | and [place]                                |
+--------------+--------------------------------------------+
| Address      | Unknown                                    |
+--------------+--------------------------------------------+
| Phone        | Unavailable                                |
+--------------+--------------------------------------------+
 
 
 
 Support
 
 
+-------------+--------------+-------------------+-----------------+
| Name        | Relationship | Address           | Phone           |
+-------------+--------------+-------------------+-----------------+
| Eduar Colbert | ECON         | [address]       | [phone] |
|             |              | [place]   |                 |
|             |              | [address]             |                 |
+-------------+--------------+-------------------+-----------------+
 
 
 
 
 Care Team Providers
 
 
+-------------------------+------+-----------------+
| Care Team Member Name   | Role | Phone           |
+-------------------------+------+-----------------+
| Tobin Alex MD | PCP  | [phone] |
+-------------------------+------+-----------------+
 
 
 
 Reason for Visit
 
 
+-----------+-----------------------------------------------------+
| Reason    | Comments                                            |
+-----------+-----------------------------------------------------+
| Follow-up | suture removal, patient states that she has no pain |
+-----------+-----------------------------------------------------+
 
 
 
 Encounter Details
 
 
+--------+---------+----------------------+----------------------+----------------------+
| Date   | Type    | Department           | Care Team            | Description          |
+--------+---------+----------------------+----------------------+----------------------+
| / | Office  |   [facility]          |   Pablito Green MD  | Neoplasm of          |
| 2015   | Visit   | OTOLARYNGOLOGY  [address]  |  [address] uncertain behavior   |
|        |         | [address] [facility] [address]  |  [address]  [place]  | of lip, oral cavity, |
|        |         |  [place], [place]     | [address] [phone]  |  and pharynx         |
|        |         | [address]           |  [phone] (Fax)  | (Primary Dx)         |
|        |         | [phone]         |                      |                      |
+--------+---------+----------------------+----------------------+----------------------+
 
 
 
 Social History
 
 
+---------------+-------+-----------+--------+------+
| Tobacco Use   | Types | Packs/Day | Years  | Date |
|               |       |           | Used   |      |
+---------------+-------+-----------+--------+------+
| Former Smoker |       |           |        |      |
+---------------+-------+-----------+--------+------+
 
 
 
+------------------------+
| Comments: 10 years ago |
+------------------------+
 
 
 
+-------------+-------------+---------+----------+
| Alcohol Use | Drinks/Week | oz/Week | Comments |
+-------------+-------------+---------+----------+
 
| Not Asked   |             |         |          |
+-------------+-------------+---------+----------+
 
 
 
+------------------+---------------+
| Sex Assigned at  | Date Recorded |
| Birth            |               |
+------------------+---------------+
| Not on file      |               |
+------------------+---------------+
 documented as of this encounter
 
 Last Filed Vital Signs
 
 
+-------------------+------------------+----------------------+----------+
| Vital Sign        | Reading          | Time Taken           | Comments |
+-------------------+------------------+----------------------+----------+
| Blood Pressure    | -                | -                    |          |
+-------------------+------------------+----------------------+----------+
| Pulse             | 56               | 2015  3:26 PM  |          |
|                   |                  | PST                  |          |
+-------------------+------------------+----------------------+----------+
| Temperature       | -                | -                    |          |
+-------------------+------------------+----------------------+----------+
| Respiratory Rate  | 16               | 2015  3:26 PM  |          |
|                   |                  | PST                  |          |
+-------------------+------------------+----------------------+----------+
| Oxygen Saturation | 97%              | 2015  3:26 PM  |          |
|                   |                  | PST                  |          |
+-------------------+------------------+----------------------+----------+
| Inhaled Oxygen    | -                | -                    |          |
| Concentration     |                  |                      |          |
+-------------------+------------------+----------------------+----------+
| Weight            | 61.2 kg (135 lb) | 2015  3:26 PM  |          |
|                   |                  | PST                  |          |
+-------------------+------------------+----------------------+----------+
| Height            | 170.2 cm (5' 7") | 2015  3:26 PM  |          |
|                   |                  | PST                  |          |
+-------------------+------------------+----------------------+----------+
| Body Mass Index   | 21.14            | 2015  3:26 PM  |          |
|                   |                  | PST                  |          |
+-------------------+------------------+----------------------+----------+
 documented in this encounter
 
 Progress Notes
 Pablito Green MD - 2015  3:43 PM PST              [facility] OTOLARYNGOLOGY
    [address] [facility] [address]  [phone]
 
 OFFICE NOTE
 
 PABLITO GREEN MD              
 Patient: SUSANA COLBERT
 Admitting:                
 MR #: [id number]
 LOC:    PT TYPE:                Account #: [id number]
 Adm Date: 2015              : 1950
 POSTOP VISIT
 
 
 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
k area.  She comes in for suture removal.  She is not  having any other problems other than 
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  The suture was removed with
out difficulty.  Her  had removed 2 of the other sutures.  Once removed, it felt  muc
h more comfortable and should continue now to heal very nicely.  There is no evidence of any
 infection.  Final pathology report was given  to the patient and advised her to check with 
ENT as needed.
 
 _________________________________________
 PABLITO GREEN MD
 
 Dictated by PABLITO GREEN MD 2015 15:43:37
 Transcribed on 2015 22:32:20 by leanne job# [id number]
 Confirmation #: [id number]
 
 cc:  TOBIN ALEX MDElectronically signed by Pablito Green MD at 2015  7:54 AM Pablito Villarreal MD - 2015  3:41 PM PSTSee dictation #
 [id number] signed by Pablito Green MD at 2015  3:44 PM PSTdocumented in th
is encounter
 
 Plan of Treatment
 
 
+--------+---------+-----------+----------------------+-------------+
| Date   | Type    | Specialty | Care Team            | Description |
+--------+---------+-----------+----------------------+-------------+
| 10/12/ | Office  | Neurology |   Jamin Brooks MD  [address] |             |
| 2020   | Visit   |           |  [address]      |             |
|        |         |           | [place]  |             |
|        |         |           | [place] [address]             |             |
|        |         |           | [phone]         |             |
|        |         |           | [phone] (Fax)   |             |
+--------+---------+-----------+----------------------+-------------+
 documented as of this encounter
 
 Visit Diagnoses
 
 
+-----------------------------------------------------------------------------+
| Diagnosis                                                                   |
+-----------------------------------------------------------------------------+
|   Neoplasm of uncertain behavior of lip, oral cavity, and pharynx - Primary |
+-----------------------------------------------------------------------------+
 documented in this encounter

## 2020-09-21 NOTE — XMS
Encounter Summary
  Created on: 2020
 
 Susana Colbert
 External Reference #: [id number]
 : 48
 Sex: Female
 
 Demographics
 
 
+-----------------------+------------------------+
| Address               | [address]         |
|                       | [place]  [address]   |
+-----------------------+------------------------+
| Home Phone            | [phone]        |
+-----------------------+------------------------+
| Preferred Language    | Unknown                |
+-----------------------+------------------------+
| Marital Status        |                 |
+-----------------------+------------------------+
| Sabianism Affiliation | Unknown                |
+-----------------------+------------------------+
| Race                  | White                  |
+-----------------------+------------------------+
| Ethnic Group          | Not  or  |
+-----------------------+------------------------+
 
 
 Author
 
 
+--------------+------------------------------+
| Author       | [facility] |
+--------------+------------------------------+
| Organization | [facility] |
+--------------+------------------------------+
| Address      | Unknown                      |
+--------------+------------------------------+
| Phone        | Unavailable                  |
+--------------+------------------------------+
 
 
 
 Support
 
 
+--------------+--------------+---------+-----------------+
| Name         | Relationship | Address | Phone           |
+--------------+--------------+---------+-----------------+
| Raj Mckeon | [place]         | Unknown | [phone] |
+--------------+--------------+---------+-----------------+
 
 
 
 Care Team Providers
 
 
 
+-----------------------+------+-------------+
| Care Team Member Name | Role | Phone       |
+-----------------------+------+-------------+
 PCP  | Unavailable |
+-----------------------+------+-------------+
 
 
 
 Encounter Details
 
 
+--------+-------------+-----------------+--------------------+---------------+
| Date   | Type        | Department      | Care Team          | Description   |
+--------+-------------+-----------------+--------------------+---------------+
| / | Office      |   [facility]  |   Clinic,          | Progress Note |
|    | Visit-Trans | ONCOLOGY        | Multidisciplinary  |               |
|        | cribed      |                 | Breast             |               |
+--------+-------------+-----------------+--------------------+---------------+
 
 
 
 Social History
 
 
+----------------+-------+-----------+--------+------+
| Tobacco Use    | Types | Packs/Day | Years  | Date |
|                |       |           | Used   |      |
+----------------+-------+-----------+--------+------+
| Never Assessed |       |           |        |      |
+----------------+-------+-----------+--------+------+
 
 
 
+------------------+---------------+
| Sex Assigned at  | Date Recorded |
| Birth            |               |
+------------------+---------------+
| Not on file      |               |
+------------------+---------------+
 documented as of this encounter
 
 Progress Notes
 Interface, Transcription In - 2005 12:47 AM PDT
      [id number]           2005 [id number]
         [id number]  MACIE CHARLES
 
 Clinic Date:    2005
 
 Clinic:        Multidisciplinary Breast
 
 Susana is a woman who is here for consultation about possible prophylactic
 tamoxifen based on a P01 protocol.  She has a family history of the mother
 developing breast cancer.  Based on her Yomaira model risk factors, I
 calculated that she has a probability of developing breast cancer within
 the next 5 years of 3.2% and a 20.6% lifetime risk.  The patient declined
 breast examination today.  She has had a recent mammography which is
 negative.  Assessment: Family history of breast cancer.  The patient has a
 3.2% chance of developing breast cancer in the next 5 years and a 20.6%
 lifetime risk.  I told her that it is important to realize that this is a
 96.8% chance that she will not breast cancer in the next 5 years and a
 
 79.4% chance that she will not for the remainder of her lifetime.  We then
 discussed the tamoxifen prevention trial.
 
 I told her that this is not really a prevention trial but a suppression
 trial.  The study was stopped at 3.7 years.  This is inadequate time to
 prevent breast cancers.  It takes generally about 10 years to grow a tumor
 on the first malignant cell to something that is barely detectable.  Thus,
 all people on the prevention trial had breast cancer when they signed up,
 and we merely suppressed the appearance of the ER positive tumors in the
 tamoxifen group.  I told her that while the study touts a 44% reduction of
 breast cancer rates but this is the relative reduction.  The study actually
 showed that there were only 69 breast cancers prevented between the
 tamoxifen group and the placebo group when the study was halted.  This was
 actually a 1% reduction.  The placebo group got breast cancer at a rate of
 2.3%, and the tamoxifen group got breast cancer at a rate of 1.3%.  This is
 a 1% difference.  However, 1% is 44% of 2.3%.  This means there is a 99%
 chance that the tamoxifen does not prevent breast cancer.  Unfortunately,
 the tamoxifen is not taken without a price. There were 4 events that were
 statistically significantly more probable with the tamoxifen therapy than
 without it.  These included deep venous thrombosis, pulmonary embolism,
 uterine carcinomas, and strokes.  Some of these events were fatal for
 patients who had no breast cancer.  If those events are added up, they add
 up to 61 harmful or fatal events.  Thus, we have 69 breast cancers
 prevented minus 61 harmful (_______________) results equals 8 more
 beneficial than harmful outcomes.  If we divide that by the 6900 women
 taking tamoxifen, the absolute benefit without risk comes out at a mere
 0.1%.
 
 She had questions about hormone replacement therapy, and I told her that
 contrary to what intuition would suggest, this maybe beneficial for women
 who are at high risk for developing breast cancer.  Most of the risk
 factors cannot be changed such as age, sex, family history, age at first
 period, and age at menopause.  However, numerous studies show that women
 who develop breast cancer with a history of HRT exposure actually develop
 more mammographically detected tumors than palpable lumps, more DCIS than
 invasive tumors, more stage I breast cancer, and it has a 13% better
 survival rate than breast cancer that develops off HRT.  The only study
 that seems to disagree with this is the [facility] study, but that study actually
 does not show that women with HRT develop more aggressive breast cancers;
 rather, the placebo group in that study was too good to be true having 10%
 fewer positive nodes than would be expected.  This apparently was due to
 lack of adequate axillary staging in elderly patients with tumors less than
 1 cm.  I told her that our study here at [facility] studying the impact of HRT on
 breast cancer and detection stage and survival is in agreement with that of
 a number of other authors.
 
 Time spent with the patient was 1 hour, the majority of which was spent on
 counseling.  The patient will make her decisions about tamoxifen prevention
 versus HRT on her own based on our consultation.
 
 Joey Haskins M.D.
 
  / 
 [id number] / [id number] / [id number] / [id number]
 D: 2005
 T: 03/15/2005
 C: 2005 [facility]
 
 cc:
 
 
 Kika Ness M.D.
 [address] [place] [address] [facility]. [address]
 [place]  [address]
 
 Electronically signed by Joey Haskins 2005 03:52:12 PM
 Electronically signed by Interface, Transcription In at 2005 12:47 AM PDTdocumented i
n this encounter
 
 Plan of Treatment
 Not on filedocumented as of this encounter
 
 Visit Diagnoses
 Not on filedocumented in this encounter

## 2020-09-21 NOTE — XMS
Encounter Summary
  Created on: 2020
 
 SayraSusana
 External Reference #: [id number]
 : 48
 Sex: Female
 
 Demographics
 
 
+-----------------------+---------------------------+
| Address               | [address]            |
|                       | [place]  [address] |
+-----------------------+---------------------------+
| Home Phone            | [phone]           |
+-----------------------+---------------------------+
| Preferred Language    | Unknown                   |
+-----------------------+---------------------------+
| Marital Status        |                    |
+-----------------------+---------------------------+
| [employer]yb[employer]ian Affiliation | 1073                      |
+-----------------------+---------------------------+
| Race                  | White                     |
+-----------------------+---------------------------+
| Ethnic Group          | Not  or     |
+-----------------------+---------------------------+
 
 
 Author
 
 
+--------------+--------------------------------------------+
| Author       | [facility]  |
|              | and [place]                                |
+--------------+--------------------------------------------+
| Organization | [facility]  |
|              | and [place]                                |
+--------------+--------------------------------------------+
| Address      | Unknown                                    |
+--------------+--------------------------------------------+
| Phone        | Unavailable                                |
+--------------+--------------------------------------------+
 
 
 
 Support
 
 
+-------------+--------------+-------------------+-----------------+
| Name        | Relationship | Address           | Phone           |
+-------------+--------------+-------------------+-----------------+
| Eduar Colbert | ECON         | [address]       | [phone] |
|             |              | [place]   |                 |
|             |              | [address]             |                 |
+-------------+--------------+-------------------+-----------------+
 
 
 
 
 Care Team Providers
 
 
+-------------------------+------+-----------------+
| Care Team Member Name   | Role | Phone           |
+-------------------------+------+-----------------+
| Tobin Alex MD | PCP  | [phone] |
+-------------------------+------+-----------------+
 
 
 
 Reason for Visit
 
 
+--------+--------+----------+
| Reason | Onset  | Comments |
|        | Date   |          |
+--------+--------+----------+
| Other  | / |          |
|        |    |          |
+--------+--------+----------+
 
 
 
 Encounter Details
 
 
+--------+-----------+----------------------+----------------------+-------------+
| Date   | Type      | Department           | Care Team            | Description |
+--------+-----------+----------------------+----------------------+-------------+
| / | Telephone |   [facility]          |   Mahin Leiva MD | Other       |
|    |           | GASTROENTEROLOGY     |   [address]  |             |
|        |           | [address]  | [address]  [place], [place] |             |
|        |           | [address]  [place] |  [address]  [phone] |             |
|        |           |  [address]          |   [phone] (Fax) |             |
|        |           | [phone]         |                      |             |
+--------+-----------+----------------------+----------------------+-------------+
 
 
 
 Social History
 
 
+---------------+-------+-----------+--------+------+
| Tobacco Use   | Types | Packs/Day | Years  | Date |
|               |       |           | Used   |      |
+---------------+-------+-----------+--------+------+
| Former Smoker |       |           |        |      |
+---------------+-------+-----------+--------+------+
 
 
 
+---------------------+---+---+---+
| Smokeless Tobacco:  |   |   |   |
| Never Used          |   |   |   |
+---------------------+---+---+---+
 
 
 
 
+------------------------+
| Comments: 10 years ago |
+------------------------+
 
 
 
+-------------+----------------------+---------+------------------+
| Alcohol Use | Drinks/Week          | oz/Week | Comments         |
+-------------+----------------------+---------+------------------+
| Yes         |   0 Standard drinks  | 0.0     | "social drinker" |
|             | or equivalent        |         |                  |
+-------------+----------------------+---------+------------------+
 
 
 
+------------------+---------------+
| Sex Assigned at  | Date Recorded |
| Birth            |               |
+------------------+---------------+
| Not on file      |               |
+------------------+---------------+
 documented as of this encounter
 
 Miscellaneous Notes
 Telephone Encounter - Nusrat Bustillos RN - 2017 11:58 AM PDTPatient calls [address] she lost her instructions and asks how long to hold the Xeralto.  Read to her per Dr. Joni gibbs's office visit to hold 3 days prior.  Patient's creatinine clearance is 86 so told patient
 likely can keep her on for EGD and colon on Friday.  She will not take any today tomorrow o
r Friday morningElectronically signed by Nusrat Bustillos RN at 2017 11:59 AM PDTd
ocumented in this encounter
 
 Plan of Treatment
 
 
+--------+---------+-----------+----------------------+-------------+
| Date   | Type    | Specialty | Care Team            | Description |
+--------+---------+-----------+----------------------+-------------+
| 10/12/ | Office  | Neurology |   Jamin Brooks MD  [address] |             |
|    | Visit   |           |  [facility]      |             |
|        |         |           | [place]  |             |
|        |         |           | [place] [address]             |             |
|        |         |           | [phone]         |             |
|        |         |           | [phone] (Fax)   |             |
+--------+---------+-----------+----------------------+-------------+
 documented as of this encounter
 
 Visit Diagnoses
 Not on filedocumented in this encounter

## 2020-09-21 NOTE — XMS
Encounter Summary
  Created on: 2020
 
 SayraSusana
 External Reference #: [id number]
 : 48
 Sex: Female
 
 Demographics
 
 
+-----------------------+---------------------------+
| Address               | [address]            |
|                       | [place]  [address] |
+-----------------------+---------------------------+
| Home Phone            | [phone]           |
+-----------------------+---------------------------+
| Preferred Language    | Unknown                   |
+-----------------------+---------------------------+
| Marital Status        |                    |
+-----------------------+---------------------------+
| Latter-day Affiliation | 1073                      |
+-----------------------+---------------------------+
| Race                  | White                     |
+-----------------------+---------------------------+
| Ethnic Group          | Not  or     |
+-----------------------+---------------------------+
 
 
 Author
 
 
+--------------+--------------------------------------------+
| Author       | [facility]  |
|              | and [place]                                |
+--------------+--------------------------------------------+
| Organization | [facility]  |
|              | and [place]                                |
+--------------+--------------------------------------------+
| Address      | Unknown                                    |
+--------------+--------------------------------------------+
| Phone        | Unavailable                                |
+--------------+--------------------------------------------+
 
 
 
 Support
 
 
+-------------+--------------+-------------------+-----------------+
| Name        | Relationship | Address           | Phone           |
+-------------+--------------+-------------------+-----------------+
| Eduar Colbert | ECON         | [address]       | [phone] |
|             |              | [place]   |                 |
|             |              | [address]             |                 |
+-------------+--------------+-------------------+-----------------+
 
 
 
 
 Care Team Providers
 
 
+-------------------------+------+-----------------+
| Care Team Member Name   | Role | Phone           |
+-------------------------+------+-----------------+
| Tobin Alex MD | PCP  | [phone] |
+-------------------------+------+-----------------+
 
 
 
 Encounter Details
 
 
+--------+----------+----------------------+----------------------+-------------+
| Date   | Type     | Department           | Care Team            | Description |
+--------+----------+----------------------+----------------------+-------------+
| / | Imaging  |   [facility] |   Provider,          |             |
| 2018   | Exam     |  [facility] EXTER[facility]    | [address] |             |
|        |          | IMAGING  [address]       |  [address]        |             |
|        |          | [address]  [place]     | [place] [address]     |             |
|        |          | [place] [address] |                      |             |
|        |          |   [phone]       |                      |             |
+--------+----------+----------------------+----------------------+-------------+
 
 
 
 Social History
 
 
+---------------+-------+-----------+--------+------+
| Tobacco Use   | Types | Packs/Day | Years  | Date |
|               |       |           | Used   |      |
+---------------+-------+-----------+--------+------+
| Former Smoker |       |           |        |      |
+---------------+-------+-----------+--------+------+
 
 
 
+---------------------+---+---+---+
| Smokeless Tobacco:  |   |   |   |
| Never Used          |   |   |   |
+---------------------+---+---+---+
 
 
 
+------------------------+
| Comments: 10 years ago |
+------------------------+
 
 
 
+-------------+----------------------+---------+------------------+
| Alcohol Use | Drinks/Week          | oz/Week | Comments         |
+-------------+----------------------+---------+------------------+
| Yes         |   0 Standard drinks  | 0.0     | "social drinker" |
|             | or equivalent        |         |                  |
+-------------+----------------------+---------+------------------+
 
 
 
 
+------------------+---------------+
| Sex Assigned at  | Date Recorded |
| Birth            |               |
+------------------+---------------+
| Not on file      |               |
+------------------+---------------+
 documented as of this encounter
 
 Plan of Treatment
 
 
+--------+---------+-----------+----------------------+-------------+
| Date   | Type    | Specialty | Care Team            | Description |
+--------+---------+-----------+----------------------+-------------+
| 10/12/ | Office  | Neurology |   Jamin Brooks MD  [address] |             |
| 2020   | Visit   |           |  [email or web address]      |             |
|        |         |           | [address]  [place],  |             |
|        |         |           | [place] [address]             |             |
|        |         |           | [phone]         |             |
|        |         |           | [phone] (Fax)   |             |
+--------+---------+-----------+----------------------+-------------+
 documented as of this encounter
 
 Procedures
 
 
+---------------------+--------+-------------+----------------------+----------------------+
| Procedure Name      | Priori | Date/Time   | Associated Diagnosis | Comments             |
|                     | ty     |             |                      |                      |
+---------------------+--------+-------------+----------------------+----------------------+
| XR KNEE LEFT 1 - 2  | Routin | 2016  |                      |   Results for this   |
| VW                  | e      | 10:30 AM    |                      | procedure are in the |
|                     |        | PDT         |                      |  results section.    |
+---------------------+--------+-------------+----------------------+----------------------+
 documented in this encounter
 
 Results
 XR Knee Left 1 - 2 Vw (2016 10:30 AM PDT)
 
+----------+
| Specimen |
+----------+
|          |
+----------+
 
 
 
+--------------------------------------------------------------------+---------------+
| Narrative                                                          | Performed At  |
+--------------------------------------------------------------------+---------------+
|   External films for comparison only - no result from [facility].  |   [facility] |
+--------------------------------------------------------------------+---------------+
 
 
 
+---------------+---------+--------------------+--------------+
| Performing    | Address | City/State/Zipcode | Phone Number |
| Organization  |         |                    |              |
 
+---------------+---------+--------------------+--------------+
|   [facility] |         |                    |              |
+---------------+---------+--------------------+--------------+
 documented in this encounter
 
 Visit Diagnoses
 Not on filedocumented in this encounter

## 2020-09-21 NOTE — XMS
Encounter Summary
  Created on: 2020
 
 SayraSusana
 External Reference #: [id number]
 : 48
 Sex: Female
 
 Demographics
 
 
+-----------------------+---------------------------+
| Address               | [address]            |
|                       | [place]  [address] |
+-----------------------+---------------------------+
| Home Phone            | [phone]           |
+-----------------------+---------------------------+
| Preferred Language    | Unknown                   |
+-----------------------+---------------------------+
| Marital Status        |                    |
+-----------------------+---------------------------+
| Yarsanism Affiliation | 1073                      |
+-----------------------+---------------------------+
| Race                  | White                     |
+-----------------------+---------------------------+
| Ethnic Group          | Not  or     |
+-----------------------+---------------------------+
 
 
 Author
 
 
+--------------+--------------------------------------------+
| Author       | [facility]  |
|              | and [place]                                |
+--------------+--------------------------------------------+
| Organization | [facility]  |
|              | and [place]                                |
+--------------+--------------------------------------------+
| Address      | Unknown                                    |
+--------------+--------------------------------------------+
| Phone        | Unavailable                                |
+--------------+--------------------------------------------+
 
 
 
 Support
 
 
+-------------+--------------+-------------------+-----------------+
| Name        | Relationship | Address           | Phone           |
+-------------+--------------+-------------------+-----------------+
| Eduar Colbert | ECON         | [address]       | [phone] |
|             |              | [place]   |                 |
|             |              | [address]             |                 |
+-------------+--------------+-------------------+-----------------+
 
 
 
 
 Care Team Providers
 
 
+-----------------------+------+-------------+
| Care Team Member Name | Role | Phone       |
+-----------------------+------+-------------+
 PCP  | Unavailable |
+-----------------------+------+-------------+
 
 
 
 Encounter Details
 
 
+--------+-----------+----------------------+-----------+-------------+
| Date   | Type      | Department           | Care Team | Description |
+--------+-----------+----------------------+-----------+-------------+
| 10/24/ | Hospital  |   [facility] |           |             |
|    | Encounter |  MED CTR XRAY  [address] |           |             |
|        |           |  [address]  [place]       |           |             |
|        |           | [place] [address] |           |             |
|        |           |   [phone]       |           |             |
+--------+-----------+----------------------+-----------+-------------+
 
 
 
 Social History
 
 
+----------------+-------+-----------+--------+------+
| Tobacco Use    | Types | Packs/Day | Years  | Date |
|                |       |           | Used   |      |
+----------------+-------+-----------+--------+------+
| Never Assessed |       |           |        |      |
+----------------+-------+-----------+--------+------+
 
 
 
+------------------+---------------+
| Sex Assigned at  | Date Recorded |
| Birth            |               |
+------------------+---------------+
| Not on file      |               |
+------------------+---------------+
 documented as of this encounter
 
 Plan of Treatment
 
 
+--------+---------+-----------+----------------------+-------------+
| Date   | Type    | Specialty | Care Team            | Description |
+--------+---------+-----------+----------------------+-------------+
| 10/12/ | Office  | Neurology |   Jamin Brooks MD  [address] |             |
| 2020   | Visit   |           |  [address]      |             |
|        |         |           | [place]  |             |
|        |         |           | [place] [address]             |             |
|        |         |           | [phone]         |             |
|        |         |           | [phone] (Fax)   |             |
+--------+---------+-----------+----------------------+-------------+
 
 documented as of this encounter
 
 Visit Diagnoses
 Not on filedocumented in this encounter

## 2020-09-21 NOTE — XMS
Encounter Summary
  Created on: 2020
 
 SayraSusana
 External Reference #: [id number]
 : 48
 Sex: Female
 
 Demographics
 
 
+-----------------------+---------------------------+
| Address               | [address]            |
|                       | [place]  [address] |
+-----------------------+---------------------------+
| Home Phone            | [phone]           |
+-----------------------+---------------------------+
| Preferred Language    | Unknown                   |
+-----------------------+---------------------------+
| Marital Status        |                    |
+-----------------------+---------------------------+
| Quaker Affiliation | 1073                      |
+-----------------------+---------------------------+
| Race                  | White                     |
+-----------------------+---------------------------+
| Ethnic Group          | Not  or     |
+-----------------------+---------------------------+
 
 
 Author
 
 
+--------------+--------------------------------------------+
| Author       | [facility] [place]  |
|              | and [place]                                |
+--------------+--------------------------------------------+
| Organization | [facility]  |
|              | and [place]                                |
+--------------+--------------------------------------------+
| Address      | Unknown                                    |
+--------------+--------------------------------------------+
| Phone        | Unavailable                                |
+--------------+--------------------------------------------+
 
 
 
 Support
 
 
+-------------+--------------+-------------------+-----------------+
| Name        | Relationship | Address           | Phone           |
+-------------+--------------+-------------------+-----------------+
| Eduar Colbert | ECON         | [address]       | [phone] |
|             |              | [place]   |                 |
|             |              | [address]             |                 |
+-------------+--------------+-------------------+-----------------+
 
 
 
 
 Care Team Providers
 
 
+-------------------------+------+-----------------+
| Care Team Member Name   | Role | Phone           |
+-------------------------+------+-----------------+
| Tobin Alex MD | PCP  | [phone] |
+-------------------------+------+-----------------+
 
 
 
 Reason for Visit
 
 
+-------------------+--------+----------+
| Reason            | Onset  | Comments |
|                   | Date   |          |
+-------------------+--------+----------+
| Medication Refill | / |          |
|                   |    |          |
+-------------------+--------+----------+
 
 
 
 Encounter Details
 
 
+--------+--------+----------------------+----------------------+-------------------+
| Date   | Type   | Department           | Care Team            | Description       |
+--------+--------+----------------------+----------------------+-------------------+
| / | Refill |   [facility] [place]          |   Pablito Nava MD  | Medication Refill |
|    |        | OTOLARYNGOLOGY  [address]  |  [address] [place] [address] |                   |
|        |        | [address] [facility] [address]  |  [address]  [place]  |                   |
|        |        |  [place]     | [address] [phone]  |                   |
|        |        | [address]           |  [phone] (Fax)  |                   |
|        |        | [phone]         |                      |                   |
+--------+--------+----------------------+----------------------+-------------------+
 
 
 
 Social History
 
 
+---------------+-------+-----------+--------+------+
| Tobacco Use   | Types | Packs/Day | Years  | Date |
|               |       |           | Used   |      |
+---------------+-------+-----------+--------+------+
| Former Smoker |       |           |        |      |
+---------------+-------+-----------+--------+------+
 
 
 
+------------------------+
| Comments: 10 years ago |
+------------------------+
 
 
 
+-------------+-------------+---------+----------+
 
| Alcohol Use | Drinks/Week | oz/Week | Comments |
+-------------+-------------+---------+----------+
| Not Asked   |             |         |          |
+-------------+-------------+---------+----------+
 
 
 
+------------------+---------------+
| Sex Assigned at  | Date Recorded |
| Birth            |               |
+------------------+---------------+
| Not on file      |               |
+------------------+---------------+
 documented as of this encounter
 
 Plan of Treatment
 
 
+--------+---------+-----------+----------------------+-------------+
| Date   | Type    | Specialty | Care Team            | Description |
+--------+---------+-----------+----------------------+-------------+
| 10/12/ | Office  | Neurology |   Jamin Brooks MD  [address] |             |
| 2020   | Visit   |           |  [address]      |             |
|        |         |           | [place]  |             |
|        |         |           | [place] [address]             |             |
|        |         |           | [phone]         |             |
|        |         |           | [phone] (Fax)   |             |
+--------+---------+-----------+----------------------+-------------+
 documented as of this encounter
 
 Visit Diagnoses
 Not on filedocumented in this encounter

## 2020-09-21 NOTE — XMS
Encounter Summary
  Created on: 2020
 
 SayraSusana
 External Reference #: [id number]
 : 48
 Sex: Female
 
 Demographics
 
 
+-----------------------+---------------------------+
| Address               | [address]            |
|                       | [place]  [address] |
+-----------------------+---------------------------+
| Home Phone            | [phone]           |
+-----------------------+---------------------------+
| Preferred Language    | Unknown                   |
+-----------------------+---------------------------+
| Marital Status        |                    |
+-----------------------+---------------------------+
| Faith Affiliation | 1073                      |
+-----------------------+---------------------------+
| Race                  | White                     |
+-----------------------+---------------------------+
| Ethnic Group          | Not  or     |
+-----------------------+---------------------------+
 
 
 Author
 
 
+--------------+--------------------------------------------+
| Author       | [facility]  |
|              | and [place]                                |
+--------------+--------------------------------------------+
| Organization | [facility]  |
|              | and [place]                                |
+--------------+--------------------------------------------+
| Address      | Unknown                                    |
+--------------+--------------------------------------------+
| Phone        | Unavailable                                |
+--------------+--------------------------------------------+
 
 
 
 Support
 
 
+-------------+--------------+-------------------+-----------------+
| Name        | Relationship | Address           | Phone           |
+-------------+--------------+-------------------+-----------------+
| Eduar Colbert | ECON         | [address]       | [phone] |
|             |              | [place]   |                 |
|             |              | [address]             |                 |
+-------------+--------------+-------------------+-----------------+
 
 
 
 
 Care Team Providers
 
 
+-------------------------+------+-----------------+
| Care Team Member Name   | Role | Phone           |
+-------------------------+------+-----------------+
| Tobin Alex MD | PCP  | [phone] |
+-------------------------+------+-----------------+
 
 
 
 Reason for Visit
 
 
+-----------+-------------------+
| Reason    | Comments          |
+-----------+-------------------+
| Follow-up | medication refill |
+-----------+-------------------+
 Self-referral (Routine)
 
+--------+--------+---------------+--------------+--------------+---------------+
| Status | Reason | Specialty     | Diagnoses /  | Referred By  | Referred To   |
|        |        |               | Procedures   | Contact      | Contact       |
+--------+--------+---------------+--------------+--------------+---------------+
| Closed |        | Otolaryngolog |   Diagnoses  |              |   Pablito Green |
|        |        | y             |  Medication  |              |  MD [address]  |
|        |        |               | Refill /     |              | [address]     |
|        |        |               | self /       |              | [address]  |
|        |        |               | Medicare     |              | [place]     |
|        |        |               | Procedures   |              | [id number]  Phone: |
|        |        |               | OFFICE VISIT |              |  [phone] |
|        |        |               |  REGULAR     |              |   Fax:        |
|        |        |               |              |              | [phone]  |
+--------+--------+---------------+--------------+--------------+---------------+
 
 
 
 
 Encounter Details
 
 
+--------+---------+----------------------+----------------------+---------------------+
| Date   | Type    | Department           | Care Team            | Description         |
+--------+---------+----------------------+----------------------+---------------------+
| / | Office  |   [facility] [place]          |   Pablito Green MD  | Vasomotor rhinitis  |
| 2016   | Visit   | OTOLARYNGOLOGY  [address]  |  [address]  [address] | (Primary Dx)        |
|        |         | [address]  |  [address]  [place]  |                     |
|        |         |  [place], [place]     | [address]  [phone]  |                     |
|        |         | [address]           |  [phone] (Fax)  |                     |
|        |         | [phone]         |                      |                     |
+--------+---------+----------------------+----------------------+---------------------+
 
 
 
 Social History
 
 
+---------------+-------+-----------+--------+------+
 
| Tobacco Use   | Types | Packs/Day | Years  | Date |
|               |       |           | Used   |      |
+---------------+-------+-----------+--------+------+
| Former Smoker |       |           |        |      |
+---------------+-------+-----------+--------+------+
 
 
 
+---------------------+---+---+---+
| Smokeless Tobacco:  |   |   |   |
| Never Used          |   |   |   |
+---------------------+---+---+---+
 
 
 
+------------------------+
| Comments: 10 years ago |
+------------------------+
 
 
 
+-------------+----------------------+---------+------------------+
| Alcohol Use | Drinks/Week          | oz/Week | Comments         |
+-------------+----------------------+---------+------------------+
| Yes         |   0 Standard drinks  | 0.0     | "social drinker" |
|             | or equivalent        |         |                  |
+-------------+----------------------+---------+------------------+
 
 
 
+------------------+---------------+
| Sex Assigned at  | Date Recorded |
| Birth            |               |
+------------------+---------------+
| Not on file      |               |
+------------------+---------------+
 documented as of this encounter
 
 Last Filed Vital Signs
 
 
+-------------------+------------------+----------------------+----------+
| Vital Sign        | Reading          | Time Taken           | Comments |
+-------------------+------------------+----------------------+----------+
| Blood Pressure    | -                | -                    |          |
+-------------------+------------------+----------------------+----------+
| Pulse             | 56               | 2016  1:50 PM  |          |
|                   |                  | PST                  |          |
+-------------------+------------------+----------------------+----------+
| Temperature       | -                | -                    |          |
+-------------------+------------------+----------------------+----------+
| Respiratory Rate  | 16               | 2016  1:50 PM  |          |
|                   |                  | PST                  |          |
+-------------------+------------------+----------------------+----------+
| Oxygen Saturation | 98%              | 2016  1:50 PM  |          |
|                   |                  | PST                  |          |
+-------------------+------------------+----------------------+----------+
| Inhaled Oxygen    | -                | -                    |          |
| Concentration     |                  |                      |          |
+-------------------+------------------+----------------------+----------+
 
| Weight            | 63.5 kg (140 lb) | 2016  1:50 PM  |          |
|                   |                  | PST                  |          |
+-------------------+------------------+----------------------+----------+
| Height            | 170.2 cm (5' 7") | 2016  1:50 PM  |          |
|                   |                  | PST                  |          |
+-------------------+------------------+----------------------+----------+
| Body Mass Index   | 21.93            | 2016  1:50 PM  |          |
|                   |                  | PST                  |          |
+-------------------+------------------+----------------------+----------+
 documented in this encounter
 
 Progress Notes
 Pablito Green MD - 2016  2:30 PM PST              [facility] [place] [facility]
    [address] [facility] [address]  [phone]
 
 OFFICE NOTE
 
 PABLITO GREEN MD              
 Patient: SUSANA COLBERT
 Admitting:                
 MR #: [id number]
 LOC:    PT TYPE:                Account #: [id number]
 Adm Date: 2016              : 1950
 
 DATE OF VISIT: 2016  
 
 HISTORY OF PRESENT ILLNESS:   The patient continues to have a lot of  problems with a runny
 drippy nose.  She has Kleenex all over the house.  She also gets little pustules at times i
n hair in the nasal opening if  she does not use the Bactroban.  She comes in for a followup
 visit.  As  long as she uses the Bactroban and also the Atrovent, she seems to keep  her no
se under fairly good control.  She does some nasal irrigation and  this also seems to help. 
 She is concerned that if she is a staph  carrier that is just a worry around some of her gr
andchildren.  A  discussion was that she should use the topical hand disinfectant  material 
and with this, she should do quite well.
 
 EXAMINATION:
 GENERAL:  Shows an alert 65-year-old female patient.  She is  communicating well.  Her voic
e quality is good.
 HEENT:  Skin of the face, nose, and ears all appear to be healthy.  Ear  canals are open.  
They are clean.  Drums are clear.  Nasal passages are  open and clear, no pustules noted tod
ay.  No drippiness at the current  time.  No polyps, mass or lesions are seen.  Floor of the
 mouth, buccal  mucosa, hard palate, teeth, lips, gums are healthy.  No mass seen in the  or
opharynx and posterior pharyngeal wall is smooth.  Tongue and soft  palate are smooth and mo
ve symmetrically.
 NECK:  There are no masses or lymphadenopathy.  Thyroid area was smooth.  Trachea is midlin
e.  Good range of motion of the neck without any pain  or discomfort noted.
 
 IMPRESSION:
 1.  Vasomotor rhinorrhea.
 2.  Chronic folliculitis of the nose.
 
 PLAN:  The patient is given a prescription for Atrovent and also for  Bactroban with refill
s for the next year.  If there are further  increasing problems, she will recheck earlier wi
 ENT.
 
 _________________________________________
 PABLITO GREEN MD
 
 Dictated by PABLITO GREEN MD 2016 14:30:45
 Transcribed on 2016 08:00:34 by marily job# [id number]
 
 Confirmation #: [id number]
 
 cc:  TOBIN ALEX MDElectronically signed by Pablito Green MD at 2016  8:19 AM PSTMar
shPablito MD - 2016  2:26 PM PSTSee dictation #
 [id number] signed by Pablito Green MD at 2016  2:33 PM PSTdocumented in th
is encounter
 
 Plan of Treatment
 
 
+--------+---------+-----------+----------------------+-------------+
| Date   | Type    | Specialty | Care Team            | Description |
+--------+---------+-----------+----------------------+-------------+
| 10/12/ | Office  | Neurology |   Jamin Brooks MD  [address] |             |
|    | Visit   |           |  [address]      |             |
|        |         |           | [place]  |             |
|        |         |           | [place] [address]             |             |
|        |         |           | [phone]         |             |
|        |         |           | [phone] (Fax)   |             |
+--------+---------+-----------+----------------------+-------------+
 documented as of this encounter
 
 Visit Diagnoses
 
 
+----------------------------------------------------------------------+
| Diagnosis                                                            |
+----------------------------------------------------------------------+
|   Vasomotor rhinitis - Primary  Allergic rhinitis, cause unspecified |
+----------------------------------------------------------------------+
 documented in this encounter

## 2020-09-21 NOTE — XMS
Encounter Summary
  Created on: 2020
 
 SayraSusana
 External Reference #: [id number]
 : 48
 Sex: Female
 
 Demographics
 
 
+-----------------------+---------------------------+
| Address               | [address]            |
|                       | [place]  [address] |
+-----------------------+---------------------------+
| Home Phone            | [phone]           |
+-----------------------+---------------------------+
| Preferred Language    | Unknown                   |
+-----------------------+---------------------------+
| Marital Status        |                    |
+-----------------------+---------------------------+
| Buddhism Affiliation | 1073                      |
+-----------------------+---------------------------+
| Race                  | White                     |
+-----------------------+---------------------------+
| Ethnic Group          | Not  or     |
+-----------------------+---------------------------+
 
 
 Author
 
 
+--------------+--------------------------------------------+
| Author       | [facility]  |
|              | and [place]                                |
+--------------+--------------------------------------------+
| Organization | [facility]  |
|              | and [place]                                |
+--------------+--------------------------------------------+
| Address      | Unknown                                    |
+--------------+--------------------------------------------+
| Phone        | Unavailable                                |
+--------------+--------------------------------------------+
 
 
 
 Support
 
 
+-------------+--------------+-------------------+-----------------+
| Name        | Relationship | Address           | Phone           |
+-------------+--------------+-------------------+-----------------+
| Eduar Colbert | ECON         | [address]       | [phone] |
|             |              | [place]   |                 |
|             |              | [address]             |                 |
+-------------+--------------+-------------------+-----------------+
 
 
 
 
 Care Team Providers
 
 
+-----------------------+------+-------------+
| Care Team Member Name | Role | Phone       |
+-----------------------+------+-------------+
 PCP  | Unavailable |
+-----------------------+------+-------------+
 
 
 
 Encounter Details
 
 
+--------+-----------+----------------------+-----------+-------------+
| Date   | Type      | Department           | Care Team | Description |
+--------+-----------+----------------------+-----------+-------------+
| 10/22/ | Hospital  |   [facility] |           |             |
|    | Encounter |  MED CTR XRAY  [address] |           |             |
|        |           |  [address]  [place]       |           |             |
|        |           | [place] [address] |           |             |
|        |           |   [phone]       |           |             |
+--------+-----------+----------------------+-----------+-------------+
 
 
 
 Social History
 
 
+----------------+-------+-----------+--------+------+
| Tobacco Use    | Types | Packs/Day | Years  | Date |
|                |       |           | Used   |      |
+----------------+-------+-----------+--------+------+
| Never Assessed |       |           |        |      |
+----------------+-------+-----------+--------+------+
 
 
 
+------------------+---------------+
| Sex Assigned at  | Date Recorded |
| Birth            |               |
+------------------+---------------+
| Not on file      |               |
+------------------+---------------+
 documented as of this encounter
 
 Plan of Treatment
 
 
+--------+---------+-----------+----------------------+-------------+
| Date   | Type    | Specialty | Care Team            | Description |
+--------+---------+-----------+----------------------+-------------+
| 10/12/ | Office  | Neurology |   Jamin Brooks MD  [address] |             |
| 2020   | Visit   |           |  [address]      |             |
|        |         |           | [place]  |             |
|        |         |           | [place] [address]             |             |
|        |         |           | [phone]         |             |
|        |         |           | [phone] (Fax)   |             |
+--------+---------+-----------+----------------------+-------------+
 
 documented as of this encounter
 
 Visit Diagnoses
 Not on filedocumented in this encounter

## 2020-09-21 NOTE — XMS
Encounter Summary
  Created on: 2020
 
 SayraSusana
 External Reference #: [id number]
 : 48
 Sex: Female
 
 Demographics
 
 
+-----------------------+---------------------------+
| Address               | [address]            |
|                       | [place]  [address] |
+-----------------------+---------------------------+
| Home Phone            | [phone]           |
+-----------------------+---------------------------+
| Preferred Language    | Unknown                   |
+-----------------------+---------------------------+
| Marital Status        |                    |
+-----------------------+---------------------------+
| Islam Affiliation | [id number]                      |
+-----------------------+---------------------------+
| Race                  | White                     |
+-----------------------+---------------------------+
| Ethnic Group          | Not  or     |
+-----------------------+---------------------------+
 
 
 Author
 
 
+--------------+--------------------------------------------+
| Author       | [facility]  |
|              | and [place]                                |
+--------------+--------------------------------------------+
| Organization | [facility]  |
|              | and [place]                                |
+--------------+--------------------------------------------+
| Address      | Unknown                                    |
+--------------+--------------------------------------------+
| Phone        | Unavailable                                |
+--------------+--------------------------------------------+
 
 
 
 Support
 
 
+-------------+--------------+-------------------+-----------------+
| Name        | Relationship | Address           | Phone           |
+-------------+--------------+-------------------+-----------------+
| Eduar Colbert | ECON         | [address]       | [phone] |
|             |              | [place]   |                 |
|             |              | [id number]             |                 |
+-------------+--------------+-------------------+-----------------+
 
 
 
 
 Care Team Providers
 
 
+-------------------------+------+-----------------+
| Care Team Member Name   | Role | Phone           |
+-------------------------+------+-----------------+
| Tobin Alex MD | PCP  | [phone] |
+-------------------------+------+-----------------+
 
 
 
 Reason for Visit
 
 
+---------------+----------+
| Reason        | Comments |
+---------------+----------+
| Colon Cancer  |          |
| Screening     |          |
+---------------+----------+
 
 
 
 Encounter Details
 
 
+--------+---------+----------------------+----------------------+----------------------+
| Date   | Type    | Department           | Care Team            | Description          |
+--------+---------+----------------------+----------------------+----------------------+
| / | Office  |   [facility]          |   Mahin Leiva MD | Personal history of  |
|    | Visit   | GASTROENTEROLOGY     |   [address]  | colonic polyps       |
|        |         | [address]  | [address]  [place], [place] | (Primary Dx);        |
|        |         | [address]  [place], [place] |  [address] [phone] | Gastroesophageal     |
|        |         |  [address]          |   [phone] (Fax) | reflux disease,      |
|        |         | [phone]         |                      | esophagitis presence |
|        |         |                      |                      |  not specified;      |
|        |         |                      |                      | Diarrhea,            |
|        |         |                      |                      | unspecified type;    |
|        |         |                      |                      | Atrial fibrillation, |
|        |         |                      |                      |  unspecified type    |
|        |         |                      |                      | ([facility])                |
+--------+---------+----------------------+----------------------+----------------------+
 
 
 
 Social History
 
 
+---------------+-------+-----------+--------+------+
| Tobacco Use   | Types | Packs/Day | Years  | Date |
|               |       |           | Used   |      |
+---------------+-------+-----------+--------+------+
| Former Smoker |       |           |        |      |
+---------------+-------+-----------+--------+------+
 
 
 
+---------------------+---+---+---+
| Smokeless Tobacco:  |   |   |   |
 
| Never Used          |   |   |   |
+---------------------+---+---+---+
 
 
 
+------------------------+
| Comments: 10 years ago |
+------------------------+
 
 
 
+-------------+----------------------+---------+------------------+
| Alcohol Use | Drinks/Week          | oz/Week | Comments         |
+-------------+----------------------+---------+------------------+
| Yes         |   0 Standard drinks  | 0.0     | "social drinker" |
|             | or equivalent        |         |                  |
+-------------+----------------------+---------+------------------+
 
 
 
+------------------+---------------+
| Sex Assigned at  | Date Recorded |
| Birth            |               |
+------------------+---------------+
| Not on file      |               |
+------------------+---------------+
 documented as of this encounter
 
 Last Filed Vital Signs
 
 
+-------------------+--------------------+----------------------+----------+
| Vital Sign        | Reading            | Time Taken           | Comments |
+-------------------+--------------------+----------------------+----------+
| Blood Pressure    | 114/66             | 2017  2:14 PM  |          |
|                   |                    | PST                  |          |
+-------------------+--------------------+----------------------+----------+
| Pulse             | 63                 | 2017  2:14 PM  |          |
|                   |                    | PST                  |          |
+-------------------+--------------------+----------------------+----------+
| Temperature       | -                  | -                    |          |
+-------------------+--------------------+----------------------+----------+
| Respiratory Rate  | 16                 | 2017  2:14 PM  |          |
|                   |                    | PST                  |          |
+-------------------+--------------------+----------------------+----------+
| Oxygen Saturation | 99%                | 2017  2:14 PM  |          |
|                   |                    | PST                  |          |
+-------------------+--------------------+----------------------+----------+
| Inhaled Oxygen    | -                  | -                    |          |
| Concentration     |                    |                      |          |
+-------------------+--------------------+----------------------+----------+
| Weight            | 61.9 kg (136 lb 8  | 2017  2:14 PM  |          |
|                   | oz)                | PST                  |          |
+-------------------+--------------------+----------------------+----------+
| Height            | 170.2 cm (5' 7")   | 2017  2:14 PM  |          |
|                   |                    | PST                  |          |
+-------------------+--------------------+----------------------+----------+
| Body Mass Index   | 21.38              | 2017  2:14 PM  |          |
|                   |                    | PST                  |          |
+-------------------+--------------------+----------------------+----------+
 
 documented in this encounter
 
 Progress Notes
 Mahin Leiva MD - 2017  2:47 PM PSTFormatting of this note might be different from
 the original.
 Subjective: 
  
 Patient ID: Susana Colbert is a 66 y.o. female.
 
 HPI Comments: The patient is seen for multiple gastrointestinal tract concerns.  Outside re
cords are reviewed
 
 The patient has had a change in bowel pattern over the past year.  She reports that she has
 looser stools up to 3-4 times a day 2-3 times a week.  She has urgency and has had occasion
al episodes of fecal soiling.  She denies nocturnal symptoms.  She denies blood or mucus in 
the stool.  She's had no abdominal pain.  Foods that used to precipitate her symptomatology 
include salads or roughage.  She has avoided foods and has continued to have a change in her
 bowel pattern.  She does not consume caffeinated beverages nonabsorbable sugars or simple s
ugars.  She has had no change in her medications except for the addition of Xarelto which ha
s been for several years.  The patient does have a history of adenomatous polyps removed fro
m the hepatic flexure and .  The patient previously had constipation which was successfu
lly treated with probiotics
 
 The patient also has symptoms of reflux.  Typical retrosternal burning usually associated w
ith spicy foods or eating late at night.  She is on no prescription medications for the same
 but does take Gaviscon on a regular basis.  She denies dysphagia or odontophagia denies sym
ptoms of aspiration supra-esophageal manifestations of reflux such as voice change aspiratio
n etc. upper endoscopy in  was positive for gastroparesis.  Biopsies of the esophagus we
re positive for reflux negative for Whitfield's metaplasia; patient previously was on Protonix
 but has not been on the same for approximately a year.
 
 Review of the chart shows total knee replacement 2016.  Her orthopedic surgeon told th
e patient that she should take prophylactic antibiotics.  Atrial fibrillation on chronic ant
icoagulation Xarelto past history of ablation; anxiety depression.  The patient reports an a
ny stress precipitates an onset of atrial fibrillation and is concerned that the prep and/or
 procedure may precipitate the same.  She is in discussion with her primary care physician a
nd cardiologist with respect to possibly preventing atrial fibrillation with propafenone for
 3 days prior to the colonoscopy
 
 .
 
 Filed Vitals: 
  17 1414 
 BP: 114/66 
 Pulse: 63 
 Resp: 16 
 PainSc:   0 - No pain 
 
 Allergies 
 Allergen Reactions 
   Metoclopramide  
   depression 
   Moexipril  
   dyspnea 
   Moexipril Hydrochloride  
 
 Past Medical History 
 Diagnosis Date 
   Anomalous atrioventricular excitation 2014 
   Depression with anxiety 2014 
 
   Essential hypertension 2014 
   Hyperlipidemia 2014 
   Internal derangement of knee 2014 
   Osteopenia 2014 
   Postmenopausal disorder 2014 
   Unspecified essential hypertension 2014 
   Disorder of bone and cartilage, unspecified 2014 
   Unspecified internal derangement of knee 2014 
   Atrial fibrillation (HCC) 2014 
   Esophageal reflux 2014 
   Arthritis  
   Esophageal reflux  
 
 Past Surgical History 
 Procedure Laterality Date 
   Atrial ablation surgery   
   Dr. Wilson 
   Knee arthroscopy Left  
   Knee joint replacement Left 2015 
   Dr. Mike 
   Cataract removal   
   Vagina surgery   
   Colonoscopy   
   Tonsillectomy   
 
 Family History 
 Problem Relation Age of Onset 
   Osteoporosis Mother  
   Dementia Mother  
   Heart attack Father  
   Other (see comment) Mother  
   PAF 
 
 Social History 
 
 Social History 
   Marital Status:  
   Spouse Name: N/A 
   Number of Children: N/A 
   Years of Education: N/A 
 
 Occupational History 
   Retired LPN  
 
 Social History Main Topics 
   Smoking status: Former Smoker 
   Smokeless tobacco: Never Used 
    Comment: 10 years ago 
   Alcohol Use: 0.0 oz/week 
   0 Standard drinks or equivalent per week 
    Comment: "social drinker" 
   Drug Use: No 
   Sexual Activity: Not Asked 
 
 Other Topics Concern 
   None 
 
 Social History Narrative 
 
 Review of Systems 
 
 Constitutional: Positive for fatigue. 
 Respiratory: Positive for cough.  
 Cardiovascular: Positive for palpitations. 
 Gastrointestinal: Positive for diarrhea. 
 Musculoskeletal: Positive for arthralgias. 
 Hematological: Bruises/bleeds easily. 
 Psychiatric/Behavioral: Positive for dysphoric mood. The patient is nervous/anxious.  
 All other systems reviewed and are negative.
 
 Objective: 
 Physical Exam 
 Constitutional: She is oriented to person, place, and time. She appears well-developed and 
well-nourished. No distress. 
 HENT: 
 Head: Normocephalic and atraumatic. 
 Right Ear: External ear normal. 
 Left Ear: External ear normal. 
 Nose: Nose normal. 
 Mouth/Throat: Oropharynx is clear and moist. No oropharyngeal exudate. 
 Eyes: Conjunctivae and EOM are normal. Pupils are equal, round, and reactive to light. Righ
t eye exhibits no discharge. Left eye exhibits no discharge. No scleral icterus. 
 Neck: Normal range of motion. Neck supple. No JVD present. No tracheal deviation present. 
 Cardiovascular: Normal rate, regular rhythm, normal heart sounds and intact distal pulses. 
 Exam reveals no gallop and no friction rub.  
 No murmur heard.
 Pulmonary/Chest: Effort normal and breath sounds normal. No respiratory distress. She has n
o wheezes. She has no rales. She exhibits no tenderness. 
 Abdominal: Soft. Bowel sounds are normal. She exhibits no distension and no mass. There is 
no tenderness. There is no rebound and no guarding. 
 Musculoskeletal: Normal range of motion. She exhibits no edema or tenderness. 
 Lymphadenopathy: 
   She has no cervical adenopathy. 
 Neurological: She is alert and oriented to person, place, and time. No cranial nerve defici
t. She exhibits normal muscle tone. Coordination normal. 
 Skin: Skin is warm and dry. No rash noted. She is not diaphoretic. No erythema. No pallor. 
 Psychiatric: She has a normal mood and affect. Her behavior is normal. Judgment and thought
 content normal. 
 Nursing note and vitals reviewed.
 
 Assessment: 
 Upper endoscopy for Whitfield's metaplasia surveillance onset of symptoms of astrocytoma esop
hageal reflux
 
 Colonoscopy for colonic polyp surveillance workup with respect to change in bowel pattern I
E looser stools
 
 Recent total knee replacement with requested antibiotic prophylaxis for colonoscopy
 
 History of atrial fibrillation with tendency toward rapid ventricular response under "stres
s"
 
 Plan: 
 Upper endoscopy possible biopsy colonoscopy.  Benefits and risks of the procedure were expl
ained to the patient she concurs.  We'll proceed with holding her Xarelto for 3 days prior t
o the procedure and antibiotic prophylaxis has been ordered.
 
 The patient's cardiac status history of anxiety etc. propofol sedation is desirable
 
 Portions of this report were transcribed using voice recognition software.  Every effort wa
s made to ensure accuracy; however, inadvertent computerized transcription errors may be pre
 
sent.
 
 Electronically signed by Mahin Leiva MD at 2017  3:22 PM PSTdocumented in this enc
ounter
 
 Plan of Treatment
 
 
+--------+---------+-----------+----------------------+-------------+
| Date   | Type    | Specialty | Care Team            | Description |
+--------+---------+-----------+----------------------+-------------+
| 10/12/ | Office  | Neurology |   Jamin Brooks MD  [address] |             |
|    | Visit   |           |  [address]      |             |
|        |         |           | [address]  [place],  |             |
|        |         |           | [place] [address]             |             |
|        |         |           | [phone]         |             |
|        |         |           | [phone] (Fax)   |             |
+--------+---------+-----------+----------------------+-------------+
 documented as of this encounter
 
 Procedures
 
 
+----------------------+--------+-------------+----------------------+----------------------
+
| Procedure Name       | Priori | Date/Time   | Associated Diagnosis | Comments             
|
|                      | ty     |             |                      |                      
|
+----------------------+--------+-------------+----------------------+----------------------
+
| LABS - EXTERNAL SCAN |        | 2017  |                      |   Results for this   
|
|                      |        | 12:00 AM    |                      | procedure are in the 
|
|                      |        | PDT         |                      |  results section.    
|
+----------------------+--------+-------------+----------------------+----------------------
+
| LABS - EXTERNAL SCAN |        | 2017  |                      |   Results for this   
|
|                      |        | 12:00 AM    |                      | procedure are in the 
|
|                      |        | PDT         |                      |  results section.    
|
+----------------------+--------+-------------+----------------------+----------------------
+
 documented in this encounter
 
 Results
 LABS - EXTERNAL SCAN (2017 12:00 AM PDT)
 
+------------------------------------------------------------------------+--------------+
| Narrative                                                              | Performed At |
+------------------------------------------------------------------------+--------------+
|   This result has an attachment that is not available.  Ordered by an  |              |
| unspecified provider.                                                  |              |
+------------------------------------------------------------------------+--------------+
 LABS - EXTERNAL SCAN (2017 12:00 AM PDT)
 
 
+------------------------------------------------------------------------+--------------+
| Narrative                                                              | Performed At |
+------------------------------------------------------------------------+--------------+
|   This result has an attachment that is not available.  Ordered by an  |              |
| unspecified provider.                                                  |              |
+------------------------------------------------------------------------+--------------+
 documented in this encounter
 
 Visit Diagnoses
 
 
+-----------------------------------------------------------------------+
| Diagnosis                                                             |
+-----------------------------------------------------------------------+
|   Personal history of colonic polyps - Primary                        |
+-----------------------------------------------------------------------+
|   Gastroesophageal reflux disease, esophagitis presence not specified |
+-----------------------------------------------------------------------+
|   Diarrhea, unspecified type                                          |
+-----------------------------------------------------------------------+
|   Atrial fibrillation, unspecified type (HCC)                         |
+-----------------------------------------------------------------------+
 documented in this encounter

## 2020-09-21 NOTE — XMS
Encounter Summary
  Created on: 2020
 
 SayraSusana
 External Reference #: [id number]
 : 48
 Sex: Female
 
 Demographics
 
 
+-----------------------+---------------------------+
| Address               | [address]            |
|                       | [place]  [address] |
+-----------------------+---------------------------+
| Home Phone            | [phone]           |
+-----------------------+---------------------------+
| Preferred Language    | Unknown                   |
+-----------------------+---------------------------+
| Marital Status        |                    |
+-----------------------+---------------------------+
| Holiness Affiliation | 1073                      |
+-----------------------+---------------------------+
| Race                  | White                     |
+-----------------------+---------------------------+
| Ethnic Group          | Not  or     |
+-----------------------+---------------------------+
 
 
 Author
 
 
+--------------+--------------------------------------------+
| Author       | [facility]  |
|              | [facility] [place]                                |
+--------------+--------------------------------------------+
| Organization | [facility]  |
|              | [facility] [place]                                |
+--------------+--------------------------------------------+
| Address      | Unknown                                    |
+--------------+--------------------------------------------+
| Phone        | Unavailable                                |
+--------------+--------------------------------------------+
 
 
 
 Support
 
 
+-------------+--------------+-------------------+-----------------+
| Name        | Relationship | Address           | Phone           |
+-------------+--------------+-------------------+-----------------+
| Eduar Colbert | ECON         | [address]       | [phone] |
|             |              | [place]   |                 |
|             |              | [id number]             |                 |
+-------------+--------------+-------------------+-----------------+
 
 
 
 
 Care Team Providers
 
 
+-------------------------+------+-----------------+
| Care Team Member Name   | Role | Phone           |
+-------------------------+------+-----------------+
| Tobin Alex MD | PCP  | [phone] |
+-------------------------+------+-----------------+
 
 
 
 Reason for Visit
 
 
+-------------+--------+----------+
| Reason      | Onset  | Comments |
|             | Date   |          |
+-------------+--------+----------+
| Appointment | / |          |
|             |    |          |
+-------------+--------+----------+
 
 
 
 Encounter Details
 
 
+--------+-----------+----------------------+----------------------+-------------+
| Date   | Type      | Department           | Care Team            | Description |
+--------+-----------+----------------------+----------------------+-------------+
| / | Telephone |   [facility]          |   Mahin Leiva MD | Appointment |
|    |           | GASTROENTEROLOGY     |   [address], [place]  |             |
|        |           | [address] [place]  | [address]  [place], [place] |             |
|        |           | [address]  [place], [place] |  [address] [phone] |             |
|        |           |  [address]          |   [phone] (Fax) |             |
|        |           | [phone]         |                      |             |
+--------+-----------+----------------------+----------------------+-------------+
 
 
 
 Social History
 
 
+---------------+-------+-----------+--------+------+
| Tobacco Use   | Types | Packs/Day | Years  | Date |
|               |       |           | Used   |      |
+---------------+-------+-----------+--------+------+
| Former Smoker |       |           |        |      |
+---------------+-------+-----------+--------+------+
 
 
 
+------------------------+
| Comments: 10 years ago |
+------------------------+
 
 
 
+-------------+-------------+---------+----------+
 
| Alcohol Use | Drinks/Week | oz/Week | Comments |
+-------------+-------------+---------+----------+
| Not Asked   |             |         |          |
+-------------+-------------+---------+----------+
 
 
 
+------------------+---------------+
| Sex Assigned at  | Date Recorded |
| Birth            |               |
+------------------+---------------+
| Not on file      |               |
+------------------+---------------+
 documented as of this encounter
 
 Miscellaneous Notes
 Telephone Encounter - Nusrat Bustillos RN - 2015 12:00 PM PDTPatient states she 
is due for her 4 yr egd and colon next year.  She has always had trouble with constipation. 
 Dr Leiva recommended a probiotic which helped.  She recently has had a change in bowel habi
ts after discontinuing the probiotic.  At times she has looser stools and at times constipat
ion. She will restart the probiotic.  She also had questions regarding long term Protonix us
e.  She will make an appointment to see Doctor Cali before her next egd,colon is due. Elect
ronically signed by Nusrat Bustillos, RN at 2015 12:08 PM PDTTelephone Encounter - 
Tati Solano - 2015  2:53 PM PDTPatient called in said she saw  four years ag
o and is having a change in bowel habits. Patient would like to speak to the nurse about thi
s, please give her a call back on her home number.Electronically signed by Tati Solano at 0
2015  2:54 PM PDTdocumented in this encounter
 
 Plan of Treatment
 
 
+--------+---------+-----------+----------------------+-------------+
| Date   | Type    | Specialty | Care Team            | Description |
+--------+---------+-----------+----------------------+-------------+
| 10/12/ | Office  | Neurology |   Jamin Brooks MD  [address]             |
|    | [address]   |           |  [address]      |             |
|        |         |           | [address]  [place],  |             |
|        |         |           | [place] [address]             |             |
|        |         |           | [phone]         |             |
|        |         |           | [phone] (Fax)   |             |
+--------+---------+-----------+----------------------+-------------+
 documented as of this encounter
 
 Visit Diagnoses
 Not on filedocumented in this encounter

## 2020-09-21 NOTE — XMS
Encounter Summary
  Created on: 2020
 
 SayraSusana
 External Reference #: [id number]
 : 48
 Sex: Female
 
 Demographics
 
 
+-----------------------+---------------------------+
| Address               | [address]            |
|                       | [place]  [address] |
+-----------------------+---------------------------+
| Home Phone            | [phone]           |
+-----------------------+---------------------------+
| Preferred Language    | Unknown                   |
+-----------------------+---------------------------+
| Marital Status        |                    |
+-----------------------+---------------------------+
| Baptism Affiliation | 1073                      |
+-----------------------+---------------------------+
| Race                  | White                     |
+-----------------------+---------------------------+
| Ethnic Group          | Not  or     |
+-----------------------+---------------------------+
 
 
 Author
 
 
+--------------+--------------------------------------------+
| Author       | [facility]  |
|              | and [place]                                |
+--------------+--------------------------------------------+
| Organization | [facility]  |
|              | and [place]                                |
+--------------+--------------------------------------------+
| Address      | Unknown                                    |
+--------------+--------------------------------------------+
| Phone        | Unavailable                                |
+--------------+--------------------------------------------+
 
 
 
 Support
 
 
+-------------+--------------+-------------------+-----------------+
| Name        | Relationship | Address           | Phone           |
+-------------+--------------+-------------------+-----------------+
| Eduar Colbert | ECON         | [address]       | [phone] |
|             |              | [place]   |                 |
|             |              | [address]             |                 |
+-------------+--------------+-------------------+-----------------+
 
 
 
 
 Care Team Providers
 
 
+-------------------------+------+-----------------+
| Care Team Member Name   | Role | Phone           |
+-------------------------+------+-----------------+
| Tobin Alex MD | PCP  | [phone] |
+-------------------------+------+-----------------+
 
 
 
 Reason for Visit
 Diagnostic/Screening (Routine)
 
+--------+--------+-----------+--------------+--------------+---------------+
| Status | Reason | Specialty | Diagnoses /  | Referred By  | Referred To   |
|        |        |           | Procedures   | Contact      | Contact       |
+--------+--------+-----------+--------------+--------------+---------------+
| Closed |        | Radiology |   Diagnoses  |   [facility]   |   [facility] |
|        |        |           |  Bone lesion | Juan Carlos CHARLES MD   |  Medicine     |
|        |        |           |   Abnormal   | [address] | [address]  |
|        |        |           | MRI          |   [place]      |  [place], |
|        |        |           | Procedures   | [place]    |  [place]           |
|        |        |           | NM Bone Scan | [id number]        | [id number]    |
|        |        |           |  3 Phase     | Phone:       | Phone:        |
|        |        |           |              | [phone] | [phone]  |
|        |        |           |              |   Fax:       |  Fax:         |
|        |        |           |              | [phone] | [phone]  |
+--------+--------+-----------+--------------+--------------+---------------+
 
 
 
 
 Encounter Details
 
 
+--------+-----------+----------------------+----------------------+-------------+
| Date   | Type      | Department           | Care Team            | Description |
+--------+-----------+----------------------+----------------------+-------------+
| / | Hospital  |   [facility] |   Juan Carlos Howard,   |             |
| 2018   | Encounter |  MED CTR NUCLEAR     | MD  [address]     |             |
|        |           | MEDICINE  [address]      | [place], [place]      |             |
|        |           | [place]  [place], | [address]  [phone]  |             |
|        |           |  [place] [address]       |  [phone] (Fax)  |             |
|        |           | [phone]         |                      |             |
+--------+-----------+----------------------+----------------------+-------------+
 
 
 
 Social History
 
 
+---------------+------------+-----------+--------+------------------+
| Tobacco Use   | Types      | Packs/Day | Years  | Date             |
|               |            |           | Used   |                  |
+---------------+------------+-----------+--------+------------------+
| Former Smoker | Cigarettes | 1.5       | 5      | Quit: 1979 |
+---------------+------------+-----------+--------+------------------+
 
 
 
 
+---------------------+---+---+---+
| Smokeless Tobacco:  |   |   |   |
| Never Used          |   |   |   |
+---------------------+---+---+---+
 
 
 
+-------------+----------------------+---------+------------------+
| Alcohol Use | Drinks/Week          | oz/Week | Comments         |
+-------------+----------------------+---------+------------------+
| Yes         |   1 Shots of liquor  | 1.0     | "social drinker" |
|             |  0 Standard drinks   |         |                  |
|             | or equivalent        |         |                  |
+-------------+----------------------+---------+------------------+
 
 
 
+------------------+---------------+
| Sex Assigned at  | Date Recorded |
| Birth            |               |
+------------------+---------------+
| Not on file      |               |
+------------------+---------------+
 documented as of this encounter
 
 Medications at Time of Discharge
 
 
+----------------------+----------------------+-----------+---------+----------+-----------+
| Medication           | Sig                  | Dispensed | Refills | Start    | End Date  |
|                      |                      |           |         | Date     |           |
+----------------------+----------------------+-----------+---------+----------+-----------+
|                      | Take 1,500 mg by     |           | 0       |          |           |
| Calcium-Magnesium-Vi | mouth.               |           |         |          |           |
| tamin D (CITRACAL    |                      |           |         |          |           |
| SLOW RELEASE)        |                      |           |         |          |           |
| 600-           |                      |           |         |          |           |
| MG-MG-UNIT TB24      |                      |           |         |          |           |
+----------------------+----------------------+-----------+---------+----------+-----------+
|   cholecalciferol    | Take 400 Units by    |           | 0       | 20 |           |
| (VITAMIN D-3) 400    | mouth Daily.         |           |         | 12       |           |
| units TABS           |                      |           |         |          |           |
+----------------------+----------------------+-----------+---------+----------+-----------+
|   ipratropium        | INSTILL TWO SPRAYS   |   15 mL   | 5       | 20 |           |
| (ATROVENT) 0.06%     | IN EACH NOSTRIL UP   |           |         | 17       |           |
| nasal spray          | TO FOUR TIMES DAILY  |           |         |          |           |
|                      | AS NEEDED            |           |         |          |           |
+----------------------+----------------------+-----------+---------+----------+-----------+
|   Polyethylene       | Apply  to eye Daily  |           | 0       |          |           |
| Glycol 400 (BLINK    | as needed.           |           |         |          |           |
| TEARS OP)            |                      |           |         |          |           |
+----------------------+----------------------+-----------+---------+----------+-----------+
|   alendronate        |                      |           | 12      | 20 |  |
| (FOSAMAX) 70 mg      |                      |           |         | 18       | 0         |
| tablet               |                      |           |         |          |           |
+----------------------+----------------------+-----------+---------+----------+-----------+
|   estradiol          | Place 10 mcg         |           | 0       |          |  |
| (VAGIFEM) 10 mcg     | vaginally as needed. |           |         |          | 0         |
 
| vaginal tablet       |                      |           |         |          |           |
+----------------------+----------------------+-----------+---------+----------+-----------+
|   Lysine 500 MG CAPS | Take 500 mg by mouth |           | 0       |          |  |
|                      |  Daily.              |           |         |          | 0         |
+----------------------+----------------------+-----------+---------+----------+-----------+
|   metoprolol         | one tablet by mouth  |           | 1       | 20 |  |
| succinate            | daily                |           |         | 17       | 0         |
| (TOPROL-XL) 25 mg 24 |                      |           |         |          |           |
|  hr tablet           |                      |           |         |          |           |
+----------------------+----------------------+-----------+---------+----------+-----------+
|   mupirocin          | Apply topically to   |   22 g    | 1       | 20 |  |
| (BACTROBAN) 2%       | the nose as          |           |         | 15       | 0         |
| ointment             | directed.            |           |         |          |           |
+----------------------+----------------------+-----------+---------+----------+-----------+
|   rivaroxaban        | Take 20 mg by mouth  |           | 0       |          |  |
| (XARELTO) 20 mg      | Daily.               |           |         |          | 0         |
| tablet               |                      |           |         |          |           |
+----------------------+----------------------+-----------+---------+----------+-----------+
|   sertraline         | 1 and 1 tablet by  |           | 0       | 20 |  |
| (ZOLOFT) 50 mg       | mouth daily          |           |         | 12       | 0         |
| tablet               |                      |           |         |          |           |
+----------------------+----------------------+-----------+---------+----------+-----------+
 documented as of this encounter
 
 Plan of Treatment
 
 
+--------+---------+-----------+----------------------+-------------+
| Date   | Type    | Specialty | Care Team            | Description |
+--------+---------+-----------+----------------------+-------------+
| 10/12/  Office  | Neurology |   Jamin Brooks MD  [address] |             |
| 2020   | Visit   |           |  [address]      |             |
|        |         |           | [place]  |             |
|        |         |           | [place] [address]             |             |
|        |         |           | [phone]         |             |
|        |         |           | [phone] (Fax)   |             |
+--------+---------+-----------+----------------------+-------------+
 documented as of this encounter
 
 Procedures
 
 
+----------------------+--------+-------------+----------------------+----------------------
+
| Procedure Name       | Priori | Date/Time   | Associated Diagnosis | Comments             
|
|                      | ty     |             |                      |                      
|
+----------------------+--------+-------------+----------------------+----------------------
+
| NM BONE SCAN 3 PHASE | Routin | 2018  |   Bone lesion        |   Results for this   
|
|                      | e      | 12:55 PM    | Abnormal MRI         | procedure are in the 
|
|                      |        | PDT         |                      |  results section.    
|
+----------------------+--------+-------------+----------------------+----------------------
+
 documented in this encounter
 
 
 Results
 NM Bone Scan 3 Phase (2018 12:55 PM PDT)
 
+----------+
| Specimen |
+----------+
|          |
+----------+
 
 
 
+------------------------------------------------------------------------+---------------+
| Narrative                                                              | Performed At  |
+------------------------------------------------------------------------+---------------+
|   EXAM:NM BONE SCAN 3 PHASE     CLINICAL HISTORY: Tibial Bone Lesion-  |   [facility] |
| Evaluate for the presence of other  Lesions, Further Evaluate for      |               |
| Infection VS Neoplasm such as metastasis or  myeloma.     TECHNIQUE:   |               |
| Blood flow and Blood pool images are acquired. 3 hour delayed whole    |               |
| body imaging is performed following the intravenous administration of  |               |
| 26.3  millicuries of technetium 99m MDP.     COMPARISON: 2018      |               |
| radiograph.   2018 MRI     FINDINGS:      Blood flow: Symmetric.  |               |
|     Blood pool: Symmetric.     3 hour delay: Focal area of metabolic   |               |
| activity in the medial right tibial  plateau.   No definite metabolic  |               |
| activity in the superior pole of the patella or  right femur.   There  |               |
| is a small amount of activity in the tibia and femur  arthroplasty     |               |
| components.   This probably falls within and except at range of        |               |
| activity post surgically.   There are degenerative changes in the      |               |
| hands  bilaterally.     IMPRESSION -     Solitary focal metabolic      |               |
| activity in the medial right tibial plateau  corresponding to the      |               |
| plain film and MRI finding.   The pattern of the lesion  morphology on |               |
|  plain film, MRI, and bone scan can be seen with degenerative  related |               |
|  subchondral cystic changes.   It is felt to be unlikely that this     |               |
| represents infection, a metastasis or plasmacytoma.     Dictated and   |               |
| Signed by: Mahin Abebe MD    Electronically signed: 2018     |               |
| 1:50 PM                                                                |               |
+------------------------------------------------------------------------+---------------+
 
 
 
+---------------------------------------------------------------------------------+
| Procedure Note                                                                  |
+---------------------------------------------------------------------------------+
|   Darrell, Rad Results In - 2018  1:53 PM PDT  EXAM:NM BONE SCAN 3 PHASE      |
|                                                                                 |
| CLINICAL HISTORY: Tibial Bone Lesion- Evaluate for the presence of other        |
| Lesions, Further Evaluate for Infection VS Neoplasm such as metastasis or       |
| myeloma.                                                                        |
|                                                                                 |
| TECHNIQUE: Blood flow and Blood pool images are acquired. 3 hour delayed whole  |
| body imaging is performed following the intravenous administration of 26.3      |
| millicuries of technetium 99m MDP.                                              |
|                                                                                 |
| COMPARISON: 2018 radiograph.  2018 MRI                                 |
|                                                                                 |
| FINDINGS:                                                                       |
|                                                                                 |
| Blood flow: Symmetric.                                                          |
|                                                                                 |
| Blood pool: Symmetric.                                                          |
|                                                                                 |
 
| 3 hour delay: Focal area of metabolic activity in the medial right tibial       |
| plateau.  No definite metabolic activity in the superior pole of the patella or |
| right femur.  There is a small amount of activity in the tibia and femur        |
| arthroplasty components.  This probably falls within and except at range of     |
| activity post surgically.  There are degenerative changes in the hands          |
| bilaterally.                                                                    |
|                                                                                 |
| IMPRESSION -                                                                    |
|                                                                                 |
| Solitary focal metabolic activity in the medial right tibial plateau            |
| corresponding to the plain film and MRI finding.  The pattern of the lesion     |
| morphology on plain film, MRI, and bone scan can be seen with degenerative      |
| related subchondral cystic changes.  It is felt to be unlikely that this        |
| represents infection, a metastasis or plasmacytoma.                             |
|                                                                                 |
| Dictated and Signed by: Mahin Abebe MD                                     |
|  Electronically signed: 2018 1:50 PM                                       |
+---------------------------------------------------------------------------------+
 
 
 
+---------------+---------+--------------------+--------------+
| Performing    | Address | City/State/Zipcode | Phone Number |
| Organization  |         |                    |              |
+---------------+---------+--------------------+--------------+
|   [facility] |         |                    |              |
+---------------+---------+--------------------+--------------+
 documented in this encounter
 
 Visit Diagnoses
 Not on filedocumented in this encounter

## 2020-09-21 NOTE — XMS
Encounter Summary
  Created on: 2020
 
 SayraSusana
 External Reference #: [id number]
 : 48
 Sex: Female
 
 Demographics
 
 
+-----------------------+---------------------------+
| Address               | [address]            |
|                       | [place]  [address] |
+-----------------------+---------------------------+
| Home Phone            | [phone]           |
+-----------------------+---------------------------+
| Preferred Language    | Unknown                   |
+-----------------------+---------------------------+
| Marital Status        |                    |
+-----------------------+---------------------------+
| Sabianist Affiliation | [id number]                      |
+-----------------------+---------------------------+
| Race                  | White                     |
+-----------------------+---------------------------+
| Ethnic Group          | Not  or     |
+-----------------------+---------------------------+
 
 
 Author
 
 
+--------------+--------------------------------------------+
| Author       | [facility]  |
|              | and [place]                                |
+--------------+--------------------------------------------+
| Organization | [facility]  |
|              | and [place]                                |
+--------------+--------------------------------------------+
| Address      | Unknown                                    |
+--------------+--------------------------------------------+
| Phone        | Unavailable                                |
+--------------+--------------------------------------------+
 
 
 
 Support
 
 
+-------------+--------------+-------------------+-----------------+
| Name        | Relationship | Address           | Phone           |
+-------------+--------------+-------------------+-----------------+
| Eduar Colbert | ECON         | [address]       | [phone] |
|             |              | [place]   |                 |
|             |              | [address]             |                 |
+-------------+--------------+-------------------+-----------------+
 
 
 
 
 Care Team Providers
 
 
+-----------------------+------+-------------+
| Care Team Member Name | Role | Phone       |
+-----------------------+------+-------------+
 PCP  | Unavailable |
+-----------------------+------+-------------+
 
 
 
 Encounter Details
 
 
+--------+-----------+----------------------+-----------+-------------+
| Date   | Type      | Department           | Care Team | Description |
+--------+-----------+----------------------+-----------+-------------+
| / | Hospital  |   [facility] |           |             |
|    | Encounter |  MED CTR GENERIC OP  |           |             |
|        |           | CONV DEPT  [address]     |           |             |
|        |           | [place]  [place], |           |             |
|        |           |  [place] [address]       |           |             |
|        |           | [phone]         |           |             |
+--------+-----------+----------------------+-----------+-------------+
 
 
 
 Social History
 
 
+----------------+-------+-----------+--------+------+
| Tobacco Use    | Types | Packs/Day | Years  | Date |
|                |       |           | Used   |      |
+----------------+-------+-----------+--------+------+
| Never Assessed |       |           |        |      |
+----------------+-------+-----------+--------+------+
 
 
 
+------------------+---------------+
| Sex Assigned at  | Date Recorded |
| Birth            |               |
+------------------+---------------+
| Not on file      |               |
+------------------+---------------+
 documented as of this encounter
 
 Plan of Treatment
 
 
+--------+---------+-----------+----------------------+-------------+
| Date   | Type    | Specialty | Care Team            | Description |
+--------+---------+-----------+----------------------+-------------+
| 10/12/ | Office  | Neurology |   Jamin Brooks MD  [address] |             |
| 2020   | Visit   |           |  [address]      |             |
|        |         |           | [place]  |             |
|        |         |           | [place] [address]             |             |
|        |         |           | [phone]         |             |
|        |         |           | [phone] (Fax)   |             |
 
+--------+---------+-----------+----------------------+-------------+
 documented as of this encounter
 
 Visit Diagnoses
 Not on filedocumented in this encounter

## 2020-09-21 NOTE — XMS
Encounter Summary
  Created on: 2020
 
 SayraSusana
 External Reference #: [id number]
 : 48
 Sex: Female
 
 Demographics
 
 
+-----------------------+---------------------------+
| Address               | [address]            |
|                       | [place]  [address] |
+-----------------------+---------------------------+
| Home Phone            | [phone]           |
+-----------------------+---------------------------+
| Preferred Language    | Unknown                   |
+-----------------------+---------------------------+
| Marital Status        |                    |
+-----------------------+---------------------------+
| Confucianism Affiliation | 1073                      |
+-----------------------+---------------------------+
| Race                  | White                     |
+-----------------------+---------------------------+
| Ethnic Group          | Not  or     |
+-----------------------+---------------------------+
 
 
 Author
 
 
+--------------+--------------------------------------------+
| Author       | [facility]  |
|              | and [place]                                |
+--------------+--------------------------------------------+
| Organization | [facility]  |
|              | and [place]                                |
+--------------+--------------------------------------------+
| Address      | Unknown                                    |
+--------------+--------------------------------------------+
| Phone        | Unavailable                                |
+--------------+--------------------------------------------+
 
 
 
 Support
 
 
+-------------+--------------+-------------------+-----------------+
| Name        | Relationship | Address           | Phone           |
+-------------+--------------+-------------------+-----------------+
| Eduar Colbert | ECON         | [address]       | [phone] |
|             |              | [place]   |                 |
|             |              | [address]             |                 |
+-------------+--------------+-------------------+-----------------+
 
 
 
 
 Care Team Providers
 
 
+-----------------------+------+-------------+
| Care Team Member Name | Role | Phone       |
+-----------------------+------+-------------+
 PCP  | Unavailable |
+-----------------------+------+-------------+
 
 
 
 Encounter Details
 
 
+--------+-----------+----------------------+----------------------+-------------+
| Date   | Type      | Department           | Care Team            | Description |
+--------+-----------+----------------------+----------------------+-------------+
| / | [facility]  |   [facility] |   Mahin Leiva MD |             |
|    | Encounter |  MED [facility] GENERIC OP  |   [address] [place]  |             |
|        |           | CONV DEPT  [address]     | [address]  [place], [place] |             |
|        |           | [place], |  [address]  [phone] |             |
|        |           |  [place] [address]       |   [phone] (Fax) |             |
|        |           | [phone]         |                      |             |
+--------+-----------+----------------------+----------------------+-------------+
 
 
 
 Social History
 
 
+----------------+-------+-----------+--------+------+
| Tobacco Use    | Types | Packs/Day | Years  | Date |
|                |       |           | Used   |      |
+----------------+-------+-----------+--------+------+
| Never Assessed |       |           |        |      |
+----------------+-------+-----------+--------+------+
 
 
 
+------------------+---------------+
| Sex Assigned at  | Date Recorded |
| Birth            |               |
+------------------+---------------+
| Not on file      |               |
+------------------+---------------+
 documented as of this encounter
 
 Medications at Time of Discharge
 
 
+----------------------+----------------------+-----------+---------+----------+-----------+
| Medication           | Sig                  | Dispensed | Refills | Start    | End Date  |
|                      |                      |           |         | Date     |           |
+----------------------+----------------------+-----------+---------+----------+-----------+
|   Ascorbic Acid      | Take 1,000 mg by     |           | 0       | 20 |  |
| (VITAMIN C) 1000 MG  | mouth Daily.         |           |         | 10       | 6         |
| tablet               |                      |           |         |          |           |
+----------------------+----------------------+-----------+---------+----------+-----------+
|   Cholecalciferol (D | TABS - one by mouth  |           | 0       | 20 |  |
 
|  1000 PO)            | daily                |           |         | 10       | 7         |
+----------------------+----------------------+-----------+---------+----------+-----------+
|   flecainide         | Take 100 mg by mouth |           | 0       | 20 |  |
| (TAMBOCOR) 100 mg    |  2 times daily.      |           |         | 10       | 6         |
| tablet               |                      |           |         |          |           |
+----------------------+----------------------+-----------+---------+----------+-----------+
|   Mometasone Furoate | SUSP - as needed     |           | 0       | 20 |  |
|  (NASONEX NA)        |                      |           |         | 10       | 6         |
+----------------------+----------------------+-----------+---------+----------+-----------+
|   Multiple           | by mouth twice daily |           | 0       | 20 |  |
| Minerals-Vitamins    |                      |           |         | 10       | 7         |
| (CALCIUM CITRATE +)  |                      |           |         |          |           |
| TABS                 |                      |           |         |          |           |
+----------------------+----------------------+-----------+---------+----------+-----------+
|   polyethylene       | as needed            |           | 0       | 20 |  |
| glycol (MIRALAX)     |                      |           |         | 10       | 7         |
| powder               |                      |           |         |          |           |
+----------------------+----------------------+-----------+---------+----------+-----------+
|   Risedronate Sodium | TABS - by mouth      |           | 0       | 20 |  |
|  (ACTONEL PO)        | monthly              |           |         | 10       | 7         |
+----------------------+----------------------+-----------+---------+----------+-----------+
|   Tetrahydrozoline   | SOLN - twice daily   |           | 0       | 20 |  |
| HCl (CVS EYE DROPS   |                      |           |         | 10       | 7         |
| OP)                  |                      |           |         |          |           |
+----------------------+----------------------+-----------+---------+----------+-----------+
 documented as of this encounter
 
 Plan of Treatment
 
 
+--------+---------+-----------+----------------------+-------------+
| Date   | Type    | Specialty | Care Team            | Description |
+--------+---------+-----------+----------------------+-------------+
| 10/12/ | Office  | Neurology |   Jamin Brooks MD  [address] |             |
| 2020   | Visit   |           |  [address]      |             |
|        |         |           | [address]  [place],  |             |
|        |         |           | [place] [address]             |             |
|        |         |           | [phone]         |             |
|        |         |           | [phone] (Fax)   |             |
+--------+---------+-----------+----------------------+-------------+
 documented as of this encounter
 
 Visit Diagnoses
 Not on filedocumented in this encounter

## 2020-09-21 NOTE — XMS
Encounter Summary
  Created on: 2020
 
 SayraSusana
 External Reference #: [id number]
 : 48
 Sex: Female
 
 Demographics
 
 
+-----------------------+---------------------------+
| Address               | [address]            |
|                       | [place]  [address] |
+-----------------------+---------------------------+
| Home Phone            | [phone]           |
+-----------------------+---------------------------+
| Preferred Language    | Unknown                   |
+-----------------------+---------------------------+
| Marital Status        |                    |
+-----------------------+---------------------------+
| Druze Affiliation | 1073                      |
+-----------------------+---------------------------+
| Race                  | White                     |
+-----------------------+---------------------------+
| Ethnic Group          | Not  or     |
+-----------------------+---------------------------+
 
 
 Author
 
 
+--------------+--------------------------------------------+
| Author       | [facility]  |
|              | and [place]                                |
+--------------+--------------------------------------------+
| Organization | [facility]  |
|              | and [place]                                |
+--------------+--------------------------------------------+
| Address      | Unknown                                    |
+--------------+--------------------------------------------+
| Phone        | Unavailable                                |
+--------------+--------------------------------------------+
 
 
 
 Support
 
 
+-------------+--------------+-------------------+-----------------+
| Name        | Relationship | Address           | Phone           |
+-------------+--------------+-------------------+-----------------+
| Eduar Colbert | ECON         | [address]       | [phone] |
|             |              | [place]   |                 |
|             |              | [address]             |                 |
+-------------+--------------+-------------------+-----------------+
 
 
 
 
 Care Team Providers
 
 
+-------------------------+------+-----------------+
| Care Team Member Name   | Role | Phone           |
+-------------------------+------+-----------------+
| Tobin Alex MD | PCP  | [phone] |
+-------------------------+------+-----------------+
 
 
 
 Reason for Visit
 
 
+-------------+--------+----------+
| Reason      | Onset  | Comments |
|             | Date   |          |
+-------------+--------+----------+
| Appointment | / |          |
|             |    |          |
+-------------+--------+----------+
 
 
 
 Encounter Details
 
 
+--------+-----------+----------------------+----------------------+-------------+
| Date   | Type      | Department           | Care Team            | Description |
+--------+-----------+----------------------+----------------------+-------------+
| / | Telephone |   [facility]          |   Mahin Leiva MD | Appointment |
|    |           | GASTROENTEROLOGY     |   [address], [place]  |             |
|        |           | [address] [place]  | [address]  [place], [place] |             |
|        |           | [address]  [place], [place] |  [address]  [phone] |             |
|        |           |  [address]          |   [phone] (Fax) |             |
|        |           | [phone]         |                      |             |
+--------+-----------+----------------------+----------------------+-------------+
 
 
 
 Social History
 
 
+---------------+-------+-----------+--------+------+
| Tobacco Use   | Types | Packs/Day | Years  | Date |
|               |       |           | Used   |      |
+---------------+-------+-----------+--------+------+
| Former Smoker |       |           |        |      |
+---------------+-------+-----------+--------+------+
 
 
 
+---------------------+---+---+---+
| Smokeless Tobacco:  |   |   |   |
| Never Used          |   |   |   |
+---------------------+---+---+---+
 
 
 
 
+------------------------+
| Comments: 10 years ago |
+------------------------+
 
 
 
+-------------+----------------------+---------+------------------+
| Alcohol Use | Drinks/Week          | oz/Week | Comments         |
+-------------+----------------------+---------+------------------+
| Yes         |   0 Standard drinks  | 0.0     | "social drinker" |
|             | or equivalent        |         |                  |
+-------------+----------------------+---------+------------------+
 
 
 
+------------------+---------------+
| Sex Assigned at  | Date Recorded |
| Birth            |               |
+------------------+---------------+
| Not on file      |               |
+------------------+---------------+
 documented as of this encounter
 
 Miscellaneous Notes
 Telephone Encounter - Nusrat Bustillos RN - 2016  4:45 PM PDTPatient had knee ruiz
rgery a year ago and now it became loose and she needs a total knee replacement which is hussein
eduled for  16.  She is on xeralto for her atrial fib. She mainly wanted to make sure s
he can get her colonoscopy done by Dr Leiva this .  She states her spouse needs one as lorraine alexander.  She will call back in September.  She has her prep from previously scheduled colonosco
py which was cancelled when she developed Atrial fib. Told patient Dr Leiva will need to rev
iew her cardiology work up and she will likely need to hold the Xeralto.Electronically roxann
d by Nusrat Bustillos RN at 2016  4:52 PM PDTTelephone Encounter - Sheridan Barrett
 - 2016  4:51 PM PDTPatient called to speak with Dr. Leiva's RN, Rodriguez in regards to s
cheduling her colonoscopy. She said that she just "needs to touch bases with Rodriguez and sched
ule her procedure before her knee surgery". Please advise and call her back at [phone]
lectronically signed by Sheridan Barrett at 2016  4:54 PM PDTdocumented in this encounte
r
 
 Plan of Treatment
 
 
+--------+---------+-----------+----------------------+-------------+
| Date   | Type    | Specialty | Care Team            | Description |
+--------+---------+-----------+----------------------+-------------+
| 10/12/ | Office  | Neurology |   Jamin Brooks MD  [address] |             |
| 2020   | Visit   |           |  [address]      |             |
|        |         |           | [place] [address]  [place],  |             |
|        |         |           | [place] [address]             |             |
|        |         |           | [phone]         |             |
|        |         |           | [phone] (Fax)   |             |
+--------+---------+-----------+----------------------+-------------+
 documented as of this encounter
 
 Visit Diagnoses
 Not on filedocumented in this encounter

## 2020-09-21 NOTE — XMS
Encounter Summary
  Created on: 2020
 
 SayraSusana
 External Reference #: [id number]
 : 48
 Sex: Female
 
 Demographics
 
 
+-----------------------+---------------------------+
| Address               | [address]            |
|                       | [place]  [address] |
+-----------------------+---------------------------+
| Home Phone            | [phone]           |
+-----------------------+---------------------------+
| Preferred Language    | Unknown                   |
+-----------------------+---------------------------+
| Marital Status        |                    |
+-----------------------+---------------------------+
| Voodoo Affiliation | 1073                      |
+-----------------------+---------------------------+
| Race                  | White                     |
+-----------------------+---------------------------+
| Ethnic Group          | Not  or     |
+-----------------------+---------------------------+
 
 
 Author
 
 
+--------------+--------------------------------------------+
| Author       | [facility]  |
|              | [facility] [place]                                |
+--------------+--------------------------------------------+
| Organization | [facility]  |
|              | [facility] [place]                                |
+--------------+--------------------------------------------+
| Address      | Unknown                                    |
+--------------+--------------------------------------------+
| Phone        | Unavailable                                |
+--------------+--------------------------------------------+
 
 
 
 Support
 
 
+-------------+--------------+-------------------+-----------------+
| Name        | Relationship | Address           | Phone           |
+-------------+--------------+-------------------+-----------------+
| Eduar Colbert | ECON         | [address]       | [phone] |
|             |              | [place]   |                 |
|             |              | [id number]             |                 |
+-------------+--------------+-------------------+-----------------+
 
 
 
 
 Care Team Providers
 
 
+-------------------------+------+-----------------+
| Care Team Member Name   | Role | Phone           |
+-------------------------+------+-----------------+
| Kash Alex MD | PCP  | [phone] |
+-------------------------+------+-----------------+
 
 
 
 Reason for Visit
 Evaluate & Treat (Emergency)
 
+--------+--------------+---------------+--------------+--------------+---------------+
| Status | Reason       | Specialty     | Diagnoses /  | Referred By  | Referred To   |
|        |              |               | Procedures   | Contact      | Contact       |
+--------+--------------+---------------+--------------+--------------+---------------+
| Closed |   Specialty  | Otolaryngolog |   Diagnoses  |   Green,     |   Pablito Green |
|        | Services     | y             |  Neoplasm of | Pablito E, MD   |  E, MD  [address]  |
|        | Required     |               |  uncertain   | [address]     |
|        |              |               | behavior of  | [address]  [address]   | [address]  [place]  |
|        |              |               | lip, oral    | [place], | [place]     |
|        |              |               | cavity, and  |  [place] [address]    | [address]  Phone: |
|        |              |               | pharynx      | Phone:       |  [phone] |
|        |              |               | Procedures   | [phone] |   Fax:        |
|        |              |               | SD EXCIS     |   Fax:       | [phone]  |
|        |              |               | MOUTH        | [phone] |               |
|        |              |               | MUCOSA/SUB,S |              |               |
|        |              |               | IMPL REPAIR  |              |               |
+--------+--------------+---------------+--------------+--------------+---------------+
 
 
 
 
 Encounter Details
 
 
+--------+---------+----------------------+----------------------+----------------------+
| Date   | Type    | Department           | Care Team            | Description          |
+--------+---------+----------------------+----------------------+----------------------+
| / | Office  |   [facility]          |   Pablito Green MD  | Benign neoplasm of   |
| 2015   | Visit   | OTOLARYNGOLOGY  [address]  |  [address]  [address] | other and            |
|        |         | [address]  |  [address]  [place]  | unspecified parts of |
|        |         |  [place], [place]     | [address]  [phone]  |  mouth (Primary Dx)  |
|        |         | [address]           |  [phone] (Fax)  |                      |
|        |         | [phone]         |                      |                      |
+--------+---------+----------------------+----------------------+----------------------+
 
 
 
 Social History
 
 
+---------------+-------+-----------+--------+------+
| Tobacco Use   | Types | Packs/Day | Years  | Date |
|               |       |           | Used   |      |
+---------------+-------+-----------+--------+------+
| Former Smoker |       |           |        |      |
 
+---------------+-------+-----------+--------+------+
 
 
 
+------------------------+
| Comments: 10 years ago |
+------------------------+
 
 
 
+-------------+-------------+---------+----------+
| Alcohol Use | Drinks/Week | oz/Week | Comments |
+-------------+-------------+---------+----------+
| Not Asked   |             |         |          |
+-------------+-------------+---------+----------+
 
 
 
+------------------+---------------+
| Sex Assigned at  | Date Recorded |
| Birth            |               |
+------------------+---------------+
| Not on file      |               |
+------------------+---------------+
 documented as of this encounter
 
 Progress Notes
 Pablito Green MD - 2015 11:56 AM PSTSee dictation #[id number] signed by ODIN Green MD at 2015 12:54 PM PSTdocumented in this encounter
 
 Procedure Notes
 Pablito Green MD - 2015 12:54 PM PST              [facility] OTOLARYNGOLOGY
    [facility] [address] [facility] [address]  [phone]
 
 OFFICE PROCEDURE
 
 PABLITO GREEN MD              
 Patient: SUSANA COLBERT
 Admitting:                
 MR #: [id number]
 LOC:    PT TYPE:                Account #: [id number]
 Adm Date: 2015              : 1950
 
 DATE OF PROCEDURE:  2015.
 
 PREOPERATIVE DIAGNOSIS:  Two buccal mucosal growths of uncertain  behavior of the right buc
marivel mucosa.
 
 POSTOPERATIVE DIAGNOSIS:  Two buccal mucosal growths of uncertain  behavior of the right bu
ccal mucosa.
 
 OPERATION PERFORMED:  Excision of 2 buccal growths with a simple repair.
 
 SURGEON:  Pablito Green MD.
 
 FINDINGS:  The patient had an area over a centimeter in size that was in  the mid buccal mu
cosal area on the right hand side and then a smaller  one that was about a 0.5 cm that was j
ust inside on the buccal mucosa  away from the commissure of her mouth on the right-hand carlos
e.  
 
 
 PROCEDURE:  The area initially was treated with some topical Xylocaine.  Once this was linda
richard, the base of these lesions was injected with  1-percent Xylocaine with epinephrine.  The
 deep lesion was then able to  be excised with a #15 blade and excised in an elliptical fash
ion.  Once  excised, the wound was closed with interrupted 5-0 Vicryl suture.  The  lesion m
ore anterior was then excised with a 15 blade through the base  and into the buccal mucosal 
area and the entire lesion was removed.  The  wound was then closed with an interrupted 5-0 
Vicryl suture.  Once no  further bleeding was present, the patient was advised to be re-seen
  again in 1 week's time.  Her specimens were sent for pathology  examination.
 
 ESTIMATED BLOOD LOSS:  Less than 5 mL.
 
 _________________________________________
 PABLITO GREEN MD
 
 Dictated by PABLITO GREEN MD 2015 12:54:42
 Transcribed on 2015 13:08:34 by jam job# [id number]
 Confirmation #: [id number]
 
 cc:  KASH ALEX MDElectronically signed by Pablito Green MD at 2015  5:05 PM [facility]
umented in this encounter
 
 Plan of Treatment
 
 
+--------+---------+-----------+----------------------+-------------+
| Date   | Type    | Specialty | Care Team            | Description |
+--------+---------+-----------+----------------------+-------------+
| 10/12/ | Office  | Neurology |   Jamin Brooks MD  [address] |             |
| 2020   | Visit   |           |  [address]      |             |
|        |         |           | [address]  [place],  |             |
|        |         |           | [place] [address]             |             |
|        |         |           | [phone]         |             |
|        |         |           | [phone] (Fax)   |             |
+--------+---------+-----------+----------------------+-------------+
 documented as of this encounter
 
 Procedures
 
 
+----------------------+--------+-------------+----------------------+----------------------
+
| Procedure Name       | Priori | Date/Time   | Associated Diagnosis | Comments             
|
|                      | ty     |             |                      |                      
|
+----------------------+--------+-------------+----------------------+----------------------
+
| PATHOLOGY - EXTERNAL |        | 2015  |                      |                      
|
|  SCAN                |        | 12:00 AM    |                      |                      
|
|                      |        | PST         |                      |                      
|
+----------------------+--------+-------------+----------------------+----------------------
+
| PATHOLOGY - EXTERNAL |        | 2015  |                      |                      
|
|  SCAN                |        | 12:00 AM    |                      |                      
|
|                      |        | PST         |                      |                      
 
|
+----------------------+--------+-------------+----------------------+----------------------
+
| SURGICAL PATHOLOGY   | Routin | 2015  |                      |   Results for this   
|
| EXAM                 | e      | 12:00 AM    |                      | procedure are in the 
|
|                      |        | PST         |                      |  results section.    
|
+----------------------+--------+-------------+----------------------+----------------------
+
| SURGICAL PATHOLOGY   | Routin | 2015  |                      |   Results for this   
|
| EXAM                 | e      | 12:00 AM    |                      | procedure are in the 
|
|                      |        | PST         |                      |  results section.    
|
+----------------------+--------+-------------+----------------------+----------------------
+
 documented in this encounter
 
 Results
 Surgical Pathology Exam (2015 12:00 AM PST)
 
+----------+
| Specimen |
+----------+
|          |
+----------+
 
 
 
+------------------------------------------------------------------------+-----------------+
| Narrative                                                              | Performed At    |
+------------------------------------------------------------------------+-----------------+
|   **** THIS IS AN AMENDED REPORT ****     SPECIMEN(S): A RIGHT CHEEK   |   [facility]  |
| ANTERIOR  SPECIMEN(S): B RIGHT CHEEK POSTERIOR     SPECIMEN SOURCE:    | [facility]          |
| A. RIGHT CHEEK ANTERIOR  B. RIGHT CHEEK POSTERIOR     REASON FOR       |                 |
| AMENDMENT: This amendment is performed to remove Pablito Green MD at the |                 |
|  [facility] location in the header.   This     |                 |
| report is not otherwise altered.   2/27/15     CLINICAL HISTORY:  No   |                 |
| preop or clinical information is given on requisition.     FINAL       |                 |
| PATHOLOGIC DIAGNOSIS:  A. Right cheek anterior, excision:  -   Benign  |                 |
| fibroma with overlying parakeratosis.  -   Negative for malignancy.    |                 |
|   B. Right cheek posterior, excision:  -   Benign, mildly inflamed     |                 |
| fibroma with overlying parakeratosis.  -   Negative for malignancy.    |                 |
|   AMB:[facility]:C2NR     MICROSCOPIC EXAMINATION:  Histologic sections of    |                 |
| all submitted blocks are examined by light microscopy. These findings, |                 |
|  together with the gross examination, support the pathologic           |                 |
| diagnosis.     GROSS DESCRIPTION:  The specimen is received in two     |                 |
| parts.     A. The specimen is labeled and designated "Susana Colbert    |                 |
| Monica, right anterior buccal mucosal cheek" and designated "right  |                 |
| cheek anterior buccal mucosa" on the requisition. Received in formalin |                 |
|  is  one cream colored excision of skin, it measures 0.5 x 0.6 cm,     |                 |
| thickness is 0.3 cm. The specimen is inked in blue and serially        |                 |
| sectioned into (A1).     B. The specimen is labeled and designated     |                 |
| "Susana Colbert, designated "right cheek, posterior buccal    |                 |
| mucosal". Received in formalin is one cream colored excision of what   |                 |
| appears to look like  skin, it measures 0.8 x 0.1 cm, thickness is 0.5 |                 |
|  cm.   The specimen is inked in blue and serially sectioned into (B1). |                 |
 
|   yt:MAXINE:navin     PERFORMING LABORATORY:  Tissue processing and slide   |                 |
| preparation were performed by [facility], [facility]   |                 |
| [address], [place] [address] (Medical Director: Stuart Sosa      |                 |
| M.D.; CLIA#: [id number]).  Professional interpretation was performed   |                 |
| by [facility], [facility] [address], [place], [place]     |                 |
| [address] (Medical Director:   Stuart Sosa M.D.; CLIA#:   [id number]). |                 |
|      Diagnostician:   Kacy Dsouza MD  Pathologist  Diagnostician:     |                 |
|   Ovidio Vazqeuz MD  Pathologist  Electronically Signed 2015    |                 |
|                                                                        |                 |
+------------------------------------------------------------------------+-----------------+
 
 
 
+-----------------+---------+--------------------+--------------+
| Performing      | Address | City/State/Zipcode | Phone Number |
| Organization    |         |                    |              |
+-----------------+---------+--------------------+--------------+
|   [facility]  |         |                    |              |
| [facility]          |         |                    |              |
+-----------------+---------+--------------------+--------------+
 Surgical Pathology Exam (2015 12:00 AM PST)
 
+----------+
| Specimen |
+----------+
|          |
+----------+
 
 
 
+------------------------------------------------------------------------+-----------------+
| Narrative                                                              | Performed At    |
+------------------------------------------------------------------------+-----------------+
|   SPECIMEN(S): A RIGHT CHEEK ANTERIOR  SPECIMEN(S): B RIGHT CHEEK      |   [facility]  |
| POSTERIOR     SPECIMEN SOURCE:  A. RIGHT CHEEK ANTERIOR  B. RIGHT      | [facility]          |
| CHEEK POSTERIOR     CLINICAL HISTORY:  No preop or clinical            |                 |
| information is given on requisition.     FINAL PATHOLOGIC DIAGNOSIS:   |                 |
| A. Right cheek anterior, excision:  -   Benign fibroma with overlying  |                 |
| parakeratosis.  -   Negative for malignancy.     B. Right cheek        |                 |
| posterior, excision:  -   Benign, mildly inflamed fibroma with         |                 |
| overlying parakeratosis.  -   Negative for malignancy.                 |                 |
| AMB:[facility]:C2NR     MICROSCOPIC EXAMINATION:  Histologic sections of all  |                 |
| submitted blocks are examined by light microscopy. These findings,     |                 |
| together with the gross examination, support the pathologic diagnosis. |                 |
|      GROSS DESCRIPTION:  The specimen is received in two parts.     A. |                 |
|  The specimen is labeled and designated "Susana Colbert,      |                 |
| right anterior buccal mucosal cheek" and designated "right cheek       |                 |
| anterior buccal mucosa" on the requisition. Received in formalin is    |                 |
| one cream colored excision of skin, it measures 0.5 x 0.6 cm,          |                 |
| thickness is 0.3 cm. The specimen is inked in blue and serially        |                 |
| sectioned into (A1).     B. The specimen is labeled and designated     |                 |
| "Susana Colbert, designated "right cheek, posterior buccal    |                 |
| mucosal". Received in formalin is one cream colored excision of what   |                 |
| appears to look like  skin, it measures 0.8 x 0.1 cm, thickness is 0.5 |                 |
|  cm.   The specimen is inked in blue and serially sectioned into (B1). |                 |
|   yt:AMB:[facility]     PERFORMING LABORATORY:  Tissue processing and slide   |                 |
| preparation were performed by [facility], [address] [place] [address],   |                 |
| [facility] [address] [address] (Medical Director: Stuart Sosa      |                 |
| M.D.; CLIA#: [id number]).  Professional interpretation was performed   |                 |
| by [facility], [address] [place] [address], [address], [place], [place]     |                 |
 
| [id number] (Medical Director:   Stuart Sosa M.D.; CLIA#:   [id number]). |                 |
|      Diagnostician:   Kacy Dsouza MD  Pathologist  Electronically     |                 |
| Signed 2015                                                      |                 |
+------------------------------------------------------------------------+-----------------+
 
 
 
+-----------------+---------+--------------------+--------------+
| Performing      | Address | City/State/Zipcode | Phone Number |
| Organization    |         |                    |              |
+-----------------+---------+--------------------+--------------+
|   [facility]  |         |                    |              |
| [facility]          |         |                    |              |
+-----------------+---------+--------------------+--------------+
 documented in this encounter
 
 Visit Diagnoses
 
 
+---------------------------------------------------------------------+
| Diagnosis                                                           |
+---------------------------------------------------------------------+
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
+---------------------------------------------------------------------+
 documented in this encounter

## 2020-09-21 NOTE — XMS
Encounter Summary
  Created on: 2020
 
 SayraSusana
 External Reference #: [id number]
 : 48
 Sex: Female
 
 Demographics
 
 
+-----------------------+---------------------------+
| Address               | [address]            |
|                       | [place]  [address] |
+-----------------------+---------------------------+
| Home Phone            | [phone]           |
+-----------------------+---------------------------+
| Preferred Language    | Unknown                   |
+-----------------------+---------------------------+
| Marital Status        |                    |
+-----------------------+---------------------------+
| Mormonism Affiliation | [id number]                      |
+-----------------------+---------------------------+
| Race                  | White                     |
+-----------------------+---------------------------+
| Ethnic Group          | Not  or     |
+-----------------------+---------------------------+
 
 
 Author
 
 
+--------------+--------------------------------------------+
| Author       | [facility]  |
|              | [facility] [place]                                |
+--------------+--------------------------------------------+
| Organization | [facility]  |
|              | and [place]                                |
+--------------+--------------------------------------------+
| Address      | Unknown                                    |
+--------------+--------------------------------------------+
| Phone        | Unavailable                                |
+--------------+--------------------------------------------+
 
 
 
 Support
 
 
+-------------+--------------+-------------------+-----------------+
| Name        | Relationship | Address           | Phone           |
+-------------+--------------+-------------------+-----------------+
| Eduar Colbert | ECON         | [address]       | [phone] |
|             |              | [place]   |                 |
|             |              | [id number]             |                 |
+-------------+--------------+-------------------+-----------------+
 
 
 
 
 Care Team Providers
 
 
+-------------------------+------+-----------------+
| Care Team Member Name   | Role | Phone           |
+-------------------------+------+-----------------+
| Tobin Alex MD | PCP  | [phone] |
+-------------------------+------+-----------------+
 
 
 
 Reason for Referral
 Diagnostic/Screening (Routine)
 
+--------+--------+-----------+--------------+--------------+---------------+
| Status | Reason | Specialty | Diagnoses /  | Referred By  | Referred To   |
|        |        |           | Procedures   | Contact      | Contact       |
+--------+--------+-----------+--------------+--------------+---------------+
| Closed |        | Radiology |   Diagnoses  |   [place],   |   [facility]     |
|        |        |           |  Right knee  | Juan Carlos CHARLES MD   | [address]  |
|        |        |           | pain,        | [address] |  [place], |
|        |        |           | unspecified  |   [place]      |  [place]           |
|        |        |           | chronicity   | [place]    | [address]    |
|        |        |           | Primary      | [address]        | Phone:        |
|        |        |           | osteoarthrit | Phone:       | [phone]  |
|        |        |           | is of right  | [phone] |  Fax:         |
|        |        |           | knee         |   Fax:       | [phone]  |
|        |        |           | Procedures   | [phone] |               |
|        |        |           | MRI Knee     |              |               |
|        |        |           | Right w wo   |              |               |
|        |        |           | Contrast     |              |               |
+--------+--------+-----------+--------------+--------------+---------------+
 
 
 
 
 Reason for Visit
 
 
+-------------+----------------------------------+
| Reason      | Comments                         |
+-------------+----------------------------------+
| New Patient | NP Right Knee Pain Onset 2016 |
+-------------+----------------------------------+
 Self-referral (Routine)
 
+--------+--------+-------------+--------------+--------------+---------------+
| Status | Reason | Specialty   | Diagnoses /  | Referred By  | Referred To   |
|        |        |             | Procedures   | Contact      | Contact       |
+--------+--------+-------------+--------------+--------------+---------------+
| Closed |        | Orthopedic  |   Diagnoses  |              |   [place],    |
|        |        | Surgery     |  Right knee  |              | Juan Carlos CHARLES MD    |
|        |        |             | pain         |              | [address]  |
|        |        |             |              |              |  [place], |
|        |        |             |              |              |  [place] [address]     |
|        |        |             |              |              | Phone:        |
|        |        |             |              |              | [phone]  |
|        |        |             |              |              |  Fax:         |
|        |        |             |              |              | [phone]  |
 
+--------+--------+-------------+--------------+--------------+---------------+
 
 
 
 
 Encounter Details
 
 
+--------+---------+----------------------+----------------------+----------------------+
| Date   | Type    | Department           | Care Team            | Description          |
+--------+---------+----------------------+----------------------+----------------------+
| / | Office  |   [facility] [place]          |   Juan Carlos Howard,   | Right knee pain,     |
| 2018   | Visit   | ORTHOPEDIC SURGERY   | MD  [address]     | unspecified          |
|        |         | [address]  [place] | [facility]      | chronicity (Primary  |
|        |         |  [place], [place]           | [address]  [phone]  | Dx); Primary         |
|        |         | [address]           |  [phone] (Fax)  | osteoarthritis of    |
|        |         | [phone]         |                      | right knee           |
+--------+---------+----------------------+----------------------+----------------------+
 
 
 
 Social History
 
 
+---------------+------------+-----------+--------+------------------+
| Tobacco Use   | Types      | Packs/Day | Years  | Date             |
|               |            |           | Used   |                  |
+---------------+------------+-----------+--------+------------------+
| Former Smoker | Cigarettes | 1.5       | 5      | Quit: 1979 |
+---------------+------------+-----------+--------+------------------+
 
 
 
+---------------------+---+---+---+
| Smokeless Tobacco:  |   |   |   |
| Never Used          |   |   |   |
+---------------------+---+---+---+
 
 
 
+-------------+----------------------+---------+------------------+
| Alcohol Use | Drinks/Week          | oz/Week | Comments         |
+-------------+----------------------+---------+------------------+
| Yes         |   1 Shots of liquor  | 1.0     | "social drinker" |
|             |  0 Standard drinks   |         |                  |
|             | or equivalent        |         |                  |
+-------------+----------------------+---------+------------------+
 
 
 
+------------------+---------------+
| Sex Assigned at  | Date Recorded |
| Birth            |               |
+------------------+---------------+
| Not on file      |               |
+------------------+---------------+
 documented as of this encounter
 
 Last Filed Vital Signs
 
 
 
+-------------------+------------------+----------------------+----------+
| Vital Sign        | Reading          | Time Taken           | Comments |
+-------------------+------------------+----------------------+----------+
| Blood Pressure    | -                | -                    |          |
+-------------------+------------------+----------------------+----------+
| Pulse             | -                | -                    |          |
+-------------------+------------------+----------------------+----------+
| Temperature       | -                | -                    |          |
+-------------------+------------------+----------------------+----------+
| Respiratory Rate  | -                | -                    |          |
+-------------------+------------------+----------------------+----------+
| Oxygen Saturation | -                | -                    |          |
+-------------------+------------------+----------------------+----------+
| Inhaled Oxygen    | -                | -                    |          |
| Concentration     |                  |                      |          |
+-------------------+------------------+----------------------+----------+
| Weight            | 64 kg (141 lb)   | 2018  9:51 AM  |          |
|                   |                  | PDT                  |          |
+-------------------+------------------+----------------------+----------+
| Height            | 170.2 cm (5' 7") | 2018  9:51 AM  |          |
|                   |                  | PDT                  |          |
+-------------------+------------------+----------------------+----------+
| Body Mass Index   | 22.08            | 2018  9:51 AM  |          |
|                   |                  | PDT                  |          |
+-------------------+------------------+----------------------+----------+
 documented in this encounter
 
 H&P Notes
 Jua nCarlos Howard MD - 2018 10:00 AM PDTFormatting of this note might be different fro
m the original.
 
          18
 
 Patient: Susana Monica Colbert
              
 
 History of present illness: Susana is a 68 y.o. female who presents with a chief complaint r
ight knee pain
 
 Patient is s/p left knee medial unicompartmental arthroplasty in  and she had it revise
d to a total knee arthroplasty in 2016 in [place] due to loosening of the tibia tray report
edly
 She has had increase problems with right knee pain that started 2 summers ago and is here t
o have evaluation and discussion of options
 She has pain mostly to the medial side of the knee - her symptoms come and go and currently
 her knee doesn't bother her much
 When she made the appointment it was much worse - she does have exacerbations and remission
s of her pain
 When it is stirred up she has weight bearing related medial knee pain that is worse with ac
tivity throughout the day and generally improved with rest
 She doesn't have a lot of night pain
 
 Past Medical History: 
 Diagnosis Date 
   Anomalous atrioventricular excitation 2014 
   Arthritis  
   Atrial fibrillation (HCC) 2014 
   Atrial flutter (HCC)  
   Depression with anxiety 2014 
 
   Disorder of bone and cartilage, unspecified 2014 
   Esophageal reflux 2014 
   Essential hypertension 2014 
   H/O campylobacteriosis 2017 
   Hyperlipidemia 2014 
   Internal derangement of knee 2014 
   Osteoarthritis  
   Osteopenia 2014 
   Postmenopausal disorder 2014 
   Unspecified essential hypertension 2014 
   Unspecified internal derangement of knee 2014 
 
 Past Surgical History: 
 Procedure Laterality Date 
   ATRIAL ABLATION SURGERY   
  Dr. Wilson 
   CARPAL TUNNEL RELEASE Left  
   CARPAL TUNNEL RELEASE Right  
   CATARACT REMOVAL   
   COLONOSCOPY   
   COLONOSCOPY N/A 2017 
  Procedure: COLONOSCOPY;  Surgeon: Mahin Leiva MD;  Location: [facility] MEDICAL PROCEDURE UNIT
 
   KNEE ARTHROSCOPY Left  
   KNEE JOINT REPLACEMENT Left 2015 
  Dr. Mike 
   TONSILLECTOMY   
   UPPER GASTROINTESTINAL ENDOSCOPY N/A 2017 
  Procedure: EGD;  Surgeon: Mahin Leiva MD;  Location: [facility] MEDICAL PROCEDURE UNIT 
   VAGINA SURGERY   
 
 Allergies 
 Allergen Reactions 
   Moexipril Hydrochloride Shortness Of Breath 
   AKA Univasc 
   Metoclopramide Other (See Comments) 
   depression 
   Oxycodone Other (See Comments) 
   "makes her crazy" 
   Propafenone Other (See Comments) 
   Did not work for her Afib, she wants this on her allergy list 
 
 Current Outpatient Prescriptions on File Prior to Visit 
 Medication Sig Dispense Refill 
   Calcium-Magnesium-Vitamin D (CITRACAL SLOW RELEASE) 600- MG-MG-UNIT TB24 Take 1,5
00 mg by mouth.   
   cholecalciferol (VITAMIN D-3) 400 units TABS Take 400 Units by mouth Daily.   
   estradiol (VAGIFEM) 10 mcg vaginal tablet Place 10 mcg vaginally as needed.   
   ipratropium (ATROVENT) 0.06% nasal spray INSTILL TWO SPRAYS IN EACH NOSTRIL UP TO FOUR 
TIMES DAILY AS NEEDED 15 mL 5 
   Lysine 500 MG CAPS Take 500 mg by mouth Daily.   
   metoprolol succinate (TOPROL-XL) 25 mg 24 hr tablet one tablet by mouth daily  1 
   mupirocin (BACTROBAN) 2% ointment Apply topically to the nose as directed. 22 g 1 
   Polyethylene Glycol 400 (BLINK TEARS OP) Apply  to eye Daily as needed.   
   rivaroxaban (XARELTO) 20 mg tablet Take 20 mg by mouth Daily.   
   sertraline (ZOLOFT) 50 mg tablet 1 and 1/2 tablet by mouth daily (Patient taking differ
ently: Take 75 mg by mouth Daily.)   
 
 No current facility-administered medications on file prior to visit.  
 
 
 Family History 
 Problem Relation Age of Onset 
   Heart attack Father  
   Osteoarthritis Father  
   Hypertension Father  
   Muscle disease Father  
   Kidney disease Father  
   Gout Father  
   Osteoporosis Mother  
   Dementia Mother  
   Other (see comment) Mother  
      PAF 
   Hypertension Mother  
   Muscle disease Mother  
   Kidney disease Mother  
   Heart attack Mother  
   Cancer Mother  
   Hypertension Other  
      Sibling history 
   Heart attack Other  
      Sibling history 
   Muscle disease Other  
      Sibling history 
   Kidney disease Other  
      Sibling history 
 
 Social History 
 
 Social History 
   Marital status:  
   Spouse name: N/A 
   Number of children: N/A 
   Years of education: N/A 
 
 Occupational History 
   Retired LPN  
 
 Social History Main Topics 
   Smoking status: Former Smoker 
   Packs/day: 1.50 
   Years: 5.00 
   Types: Cigarettes 
   Quit date: 1979 
   Smokeless tobacco: Never Used 
   Alcohol use 0.6 oz/week 
   1 Shots of liquor per week 
    Comment: "social drinker" 
   Drug use: No 
   Sexual activity: Not on file 
 
 Other Topics Concern 
   Not on file 
 
 Social History Narrative 
   No narrative on file 
 
 Review of Systems 
 
 Eyes:      [] Double vision     [x] Glasses/contacts      [] Failing vision  
 
 
 Ear/Nose/Throat:      [] Frequent Colds       [] Sinus Disease         [] Nose obstruction 
  
            [] Sneezing Spells           [] Change in taste           [] Artificial teeth
            [] Ears ringing                 [] Ear pain                      [] Hearing loss
                             
           [] Teeth problems         [] Hoarseness           [] Neck swelling 
                        [x] Sore throat               [] Congestion
   [] Nosebleeds             [] Nasal allergies
 
 Respiratory:              [] Asthma/Wheezing      [] Pneumonia     [] Night sweats   
          [] Shortness of breath        [] Chronic cough          [] Coughing up blood
    [] Exposure to tuberculosis     
 
 Cardiovascular:        [] Heart Problems      [] Hypertension     [] Heart murmur  
              [] Palpitations                [] Rheumatic fever              [] Phlebitis
         [] Chest pain           [] Ankle swelling        [] Leg cramps             [] Racin
g heart
  [] Skipping beats               [] Blood clots 
 Gastrointestinal:      [] Abdominal pain     [] Heartburn    [] Blood from rectum   
              [] Colitis                 [] Gallbladder problems                   [] Troubl
e swallowing
  [] Bloated stomach                [] Change in stools             [] Vomiting blood
                [] Nausea               [] Hemorrhoids           []  Jaundice              [
]  Hepatitis
  [] Diarrhea                   [] Constipation                [] Diverticulitis 
 
 Urinary Tract:      [] Painful urination     [] Kidney Stones     [] Any urine leakage   
                [] Weak urine stream   [] Night urination  [] Urine infections  
  [] Bedwetting   [] Blood in urine  
 
 Skin:      [] Skin rashes              [] Itching/Burning             [x] Skin bruises easi
ly  
                [] Artificial tanning          [] Skin cancer           [] Hair loss       
                         [] Changes in moles  
 
 Musculoskeletal:   [] Physical handicaps [] Back or shoulder pain
  []Rheumatoid disease          [x] Osteoarthritis        [x]  Joint pain            
         [] Joint swelling     []Gout                   []  Leg cramps at night
 
 Neurological:      [] Headaches     [] Seizures     [] Stroke/TIA   
                [] Faintness  [] Tremors    [] Numbness [] Dizziness  
       [] Changes in handwriting  []  Memory loss  []  Shooting pains
 
 Psychiatric:  [] Depression     []  Suicidal thoughts 
  []  Sleep pattern changes                                  [] Appetite changes
  [] Recent counseling    [] Nervousness/anxiety     []  Physical violence 
             []  Marital problems
 Endocrine:      [] Thyroid     [] Diabetes     
 
 Systemic:    []Weight loss/gain (over 10 lbs)      []Fever/chills        []Fatigue   
                      [] Sleeping Difficulties  [] Speech change  [] Voice change
 
 There were no vitals filed for this visit. Estimated body mass index is 22.08 kg/m as marivel
culated from the following:
   Height as of this encounter: 1.702 m (5' 7").
   Weight as of this encounter: 64 kg (141 lb).
 
 On exam left knee with medial arthrotomy scar from her previous TKA 
 She comes into full extension and can flex 120
 No instability is detected
 
 Right knee with medial joint line tenderness
 No scars or rashes  - modest effusion in pouch
 No varus or valgus stress instability
 Negative lachman and pivot shift
 
 xrays reviewed by me and show near bone on bone medial compartment right knee
 She has a cystic lesion in the subchondral bone of the medial tibia plateau
 
 Assessment: medial compartment osteoarthritis right knee
 
 The natural history and treatment options discussed at length with her and her  tae madsen
 The role of medial unicompartmental arthroplasty is discussed and given her recent negative
 experience with that on the left neither she or I are enthusiastic about considering that f
or the right knee
 And so the options are to live with it as best she can and for as long as she can with cons
ervative measures until the day comes when she is ready to go through knee replacement
 Since she hasn't had a perfect result with her left knee TKA and still has intermittent ach
iness I think she is a good  of when the right knee symptoms will rise to the level zeeshan
t would make TKA worth it to her
 At the present time she doesn't think that they are
 
 I reviewed her xrays with Dr. Ocampo who rec MRI of the knee to better assess the lesion i
n the medial tibia lesion
 ______________________________________________________________________
 
 This note was copied by office staff from an external medical record. It was written by the
 provider listed and the pasted text includes the patient name, date of birth, and original 
electronic notation of signature by the author.Electronically signed by MD roque Wright
t 2018  1:04 PM PDTdocumented in this encounter
 
 Miscellaneous Notes
 Addendum Note - Juan Carlos Howard MD - 2018 10:00 AM PDT Addended by: JUAN CARLOS HOWARD 
on: 2018 17:16
 
  Modules accepted: Orders
 
   Electronically signed by Juan Carlos Howard MD at 2018  5:16 PM PDTAddendum Note - Juan Carlos Addison MD - 2018 10:00 AM PDT Addended by: JUAN CARLOS HOWARD on: 2018 12:53
 
  Modules accepted: Orders
 
   Electronically signed by Juan Carlos Howard MD at 2018 12:53 PM PDTdocumented in this 
encounter
 
 Plan of Treatment
 
 
+--------+---------+-----------+----------------------+-------------+
| Date   | Type    | Specialty | Care Team            | Description |
+--------+---------+-----------+----------------------+-------------+
| 10/12/ | Office  | Neurology |   Jamin Brooks MD  [address] |             |
| 2020   | Visit   |           |  [address]      |             |
|        |         |           | [address]  [place],  |             |
|        |         |           | [place] [address]             |             |
|        |         |           | [phone]         |             |
|        |         |           | [phone] (Fax)   |             |
+--------+---------+-----------+----------------------+-------------+
 documented as of this encounter
 
 
 Results
 MRI Knee Right w wo Contrast (2018  8:09 AM PDT)
 
+----------+
| Specimen |
+----------+
|          |
+----------+
 
 
 
+------------------------------------------------------------------------+---------------+
| Narrative                                                              | Performed At  |
+------------------------------------------------------------------------+---------------+
|   UNENHANCED AND ENHANCED MRI RIGHT KNEE 2018 7:48 AM             |   [facility] |
| CLINICAL HISTORY: Right Knee Pain- Further Evaluate the Tibial Lesion  |               |
| see on the  Xray           COMPARISON: Radiographs  and        |               |
| 2015      TECHNIQUE: The following 3T MR sequences of the     |               |
| right knee were obtained:  1. Coronal T1 and axial T1 with fat         |               |
| suppression.  2. Axial proton density with fat-suppression and         |               |
| sagittal proton density without  fat-suppression.  3. Sagittal T2 with |               |
|  fat-suppression.     4. Coronal STIR.     5. Following the uneventful |               |
|  intravenous administration of 6 cc Gadavist, axial,  sagittal and     |               |
| coronal fat suppressed T1 sequences were obtained.     FINDINGS: A     |               |
| lobulated, circumscribed lesion is present peripherally in the         |               |
| anteromedial tibial plateau, measuring up to 1.2 x 0.8 x 1.5 cm in     |               |
| size, and  corresponding with the lytic lesion described on recent     |               |
| radiography.   The lesion  closely approximates the adjacent tibial    |               |
| cortex, which appears grossly  maintained.   The contents of the       |               |
| lesion are heterogeneously hyperintense on  T2-weighted images, with   |               |
| some thin low signal internal septae suggested.   The  lesion is       |               |
| homogeneously higher signal than the adjacent marrow on the            |               |
| unenhanced, fat-suppressed T1-weighted images, with smoothly           |               |
| marginated  enhancement present predominantly in the inferior aspect   |               |
| of the lesion following  contrast administration.   Edema and some     |               |
| patchy enhancement of the surrounding  marrow is also apparent.   An   |               |
| additional 10 mm rounded, fairly circumscribed  mixed signal lesion is |               |
|  present in the subarticular, slightly posterior aspect of  the medial |               |
|  femoral condyle and demonstrates no visible enhancement or signal     |               |
| alteration involving the adjacent marrow, and corresponds with a       |               |
| stable rounded  sclerotic lesion visible on radiography dating back to |               |
|  .   An ovoid region of  increased T2-weighted signal in the far   |               |
| superior pole of the patella measures up  to 1.4 x 0.6 x 0.5 cm and    |               |
| demonstrates no convincing enhancement following  contrast             |               |
| administration.   Marrow signal is normal elsewhere.   No fracture is  |               |
|  evident.     The anterior and posterior cruciate ligaments, medial    |               |
| collateral ligament and  lateral collateral ligamentous complex appear |               |
|  intact and unremarkable, along  with the patellar retinacula,         |               |
| patellar tendon and imaged quadriceps tendon.   Localized signal       |               |
| alteration involving the free margin of the body and posterior  horn   |               |
| of the medial meniscus is suspicious for meniscal tear.   The lateral  |               |
|  meniscus appears intact and unremarkable.   There is moderate,        |               |
| generalized  cartilage loss within the medial femorotibial joint       |               |
| compartment.   Cartilage  within the lateral compartment is            |               |
| maintained.   There is mild to moderate  cartilage loss involving the  |               |
| patella.   A small volume of joint fluid is present.   No              |               |
| intra-articular loose body is identified.   Soft tissues about the     |               |
| knee are  otherwise unremarkable.     IMPRESSION -  1.   1.5 CM        |               |
| CIRCUMSCRIBED, SEPTATED AND ECCENTRICALLY ENHANCING LESION IN THE      |               |
| ANTEROMEDIAL ASPECT OF THE TIBIAL PLATEAU, CORRESPONDING WITH A LYTIC  |               |
 
| LESION  DESCRIBED ON RECENT RADIOGRAPHY, WITH EDEMA AND PATCHY         |               |
| ENHANCEMENT OF THE  SURROUNDING MARROW.   THE CONSTELLATION OF         |               |
| FINDINGS IS CONCERNING FOR INFECTION  VERSUS NEOPLASM SUCH AS          |               |
| METASTASIS OR MYELOMA.   TISSUE SAMPLING SHOULD BE  CONSIDERED.   BONE |               |
|  SCAN AND/OR SCREENING MRI OF THE MARROW MAY BE BENEFICIAL TO          |               |
| EVALUATE FOR THE PRESENCE OF OTHER LESIONS.   AN ADDITIONAL SMALL      |               |
| LESION IN THE  SUPERIOR POLE OF THE PATELLA DEMONSTRATES NO CONVINCING |               |
|  ENHANCEMENT OR ADJACENT  MARROW EDEMA AND IS DIFFICULT TO FURTHER     |               |
| CHARACTERIZE.   A NON-ENHANCING LESION  IN THE POSTERIOR ASPECT OF THE |               |
|  MEDIAL FEMORAL CONDYLE CORRESPONDS WITH A CHRONIC  SCLEROTIC LESION   |               |
| ON PREVIOUS RADIOGRAPHY.     2.   PROBABLE TEAR OF THE MEDIAL MENISCUS |               |
|  WITH ASYMMETRIC, GENERALIZED  CHONDROMALACIA IN THE MEDIAL            |               |
| FEMOROTIBIAL COMPARTMENT AND SMALL JOINT EFFUSION.     Dictated and    |               |
| Signed by: Garry Jensen MD    Electronically signed: 2018 9:21   |               |
| AM                                                                     |               |
+------------------------------------------------------------------------+---------------+
 
 
 
+------------------------------------------------------------------------------------------+
| Procedure Note                                                                           |
+------------------------------------------------------------------------------------------+
|   [facility] Results In - 2018  9:24 AM PDT  UNENHANCED AND ENHANCED MRI RIGHT KNEE  |
| 2018 7:48 AMCLINICAL HISTORY: Right Knee Pain- Further Evaluate the Tibial Lesion   |
| see on theXray    COMPARISON: Radiographs  and 2015 TECHNIQUE: The      |
| following 3T MR sequences of the right knee were obtained:1. Coronal T1 and axial T1     |
| with fat suppression.2. Axial proton density with fat-suppression and sagittal proton    |
| density withoutfat-suppression.3. Sagittal T2 with fat-suppression.  4. Coronal STIR.    |
| 5. Following the uneventful intravenous administration of 6 cc Gadavist, axial,sagittal  |
| and coronal fat suppressed T1 sequences were obtained.FINDINGS: A lobulated,             |
| circumscribed lesion is present peripherally in theanteromedial tibial plateau,          |
| measuring up to 1.2 x 0.8 x 1.5 cm in size, andcorresponding with the lytic lesion       |
| described on recent radiography.  The lesionclosely approximates the adjacent tibial     |
| cortex, which appears grosslymaintained.  The contents of the lesion are heterogeneously |
|  hyperintense onT2-weighted images, with some thin low signal internal septae suggested. |
|   Thelesion is homogeneously higher signal than the adjacent marrow on theunenhanced,    |
| fat-suppressed T1-weighted images, with smoothly marginatedenhancement present           |
| predominantly in the inferior aspect of the lesion followingcontrast administration.     |
| Edema and some patchy enhancement of the surroundingmarrow is also apparent.  An         |
| additional 10 mm rounded, fairly circumscribedmixed signal lesion is present in the      |
| subarticular, slightly posterior aspect ofthe medial femoral condyle and demonstrates no |
|  visible enhancement or signalalteration involving the adjacent marrow, and corresponds  |
| with a stable roundedsclerotic lesion visible on radiography dating back to .  An    |
| ovoid region ofincreased T2-weighted signal in the far superior pole of the patella      |
| measures upto 1.4 x 0.6 x 0.5 cm and demonstrates no convincing enhancement              |
| followingcontrast administration.  Marrow signal is normal elsewhere.  No fracture       |
| isevident.The anterior and posterior cruciate ligaments, medial collateral ligament      |
| andlateral collateral ligamentous complex appear intact and unremarkable, alongwith the  |
| patellar retinacula, patellar tendon and imaged quadriceps tendon. Localized signal      |
| alteration involving the free margin of the body and posteriorhorn of the medial         |
| meniscus is suspicious for meniscal tear.  The lateralmeniscus appears intact and        |
| unremarkable.  There is moderate, generalizedcartilage loss within the medial            |
| femorotibial joint compartment.  Cartilagewithin the lateral compartment is maintained.  |
|  There is mild to moderatecartilage loss involving the patella.  A small volume of joint |
|  fluid is present. No intra-articular loose body is identified.  Soft tissues about the  |
| knee areotherwise unremarkable.IMPRESSION -1.  1.5 CM CIRCUMSCRIBED, SEPTATED AND        |
| ECCENTRICALLY ENHANCING LESION IN THEANTEROMEDIAL ASPECT OF THE TIBIAL PLATEAU,          |
| CORRESPONDING WITH A LYTIC LESIONDESCRIBED ON RECENT RADIOGRAPHY, WITH EDEMA AND PATCHY  |
| ENHANCEMENT OF THESURROUNDING MARROW.  THE CONSTELLATION OF FINDINGS IS CONCERNING FOR   |
| INFECTIONVERSUS NEOPLASM SUCH AS METASTASIS OR MYELOMA.  TISSUE SAMPLING SHOULD          |
 
| BECONSIDERED.  BONE SCAN AND/OR SCREENING MRI OF THE MARROW MAY BE BENEFICIAL TOEVALUATE |
|  FOR THE PRESENCE OF OTHER LESIONS.  AN ADDITIONAL SMALL LESION IN THESUPERIOR POLE OF   |
| THE PATELLA DEMONSTRATES NO CONVINCING ENHANCEMENT OR ADJACENTMARROW EDEMA AND IS        |
| DIFFICULT TO FURTHER CHARACTERIZE.  A NON-ENHANCING LESIONIN THE POSTERIOR ASPECT OF THE |
|  MEDIAL FEMORAL CONDYLE CORRESPONDS WITH A CHRONICSCLEROTIC LESION ON PREVIOUS           |
| RADIOGRAPHY.2.  PROBABLE TEAR OF THE MEDIAL MENISCUS WITH ASYMMETRIC,                    |
| GENERALIZEDCHONDROMALACIA IN THE MEDIAL FEMOROTIBIAL COMPARTMENT AND SMALL JOINT         |
| EFFUSION.Dictated and Signed by: Garry Jensen MD  Electronically signed: 2018 9:21  |
| AM                                                                                       |
|                                                                                          |
|IMPRESSION -                                                                              |
|1.  1.5 CM CIRCUMSCRIBED, SEPTATED AND ECCENTRICALLY ENHANCING LESION IN THE              |
|ANTEROMEDIAL ASPECT OF THE TIBIAL PLATEAU, CORRESPONDING WITH A LYTIC LESION              |
|DESCRIBED ON RECENT RADIOGRAPHY, WITH EDEMA AND PATCHY ENHANCEMENT OF THE                 |
|SURROUNDING MARROW.  THE CONSTELLATION OF FINDINGS IS CONCERNING FOR INFECTION            |
|VERSUS NEOPLASM SUCH AS METASTASIS OR MYELOMA.  TISSUE SAMPLING SHOULD BE                 |
|CONSIDERED.  BONE SCAN AND/OR SCREENING MRI OF THE MARROW MAY BE BENEFICIAL TO           |
|EVALUATE FOR THE PRESENCE OF OTHER LESIONS.  AN ADDITIONAL SMALL LESION IN THE            |
|SUPERIOR POLE OF THE PATELLA DEMONSTRATES NO CONVINCING ENHANCEMENT OR ADJACENT           |
|MARROW EDEMA AND IS DIFFICULT TO FURTHER CHARACTERIZE.  A NON-ENHANCING LESION            |
|IN THE POSTERIOR ASPECT OF THE MEDIAL FEMORAL CONDYLE CORRESPONDS WITH A CHRONIC          |
|SCLEROTIC LESION ON PREVIOUS RADIOGRAPHY.                                                 |
|                                                                                          |
|2.  PROBABLE TEAR OF THE MEDIAL MENISCUS WITH ASYMMETRIC, GENERALIZED                     |
|CHONDROMALACIA IN THE MEDIAL FEMOROTIBIAL COMPARTMENT AND SMALL JOINT EFFUSION.           |
|                                                                                          |
|Dictated and Signed by: Garry Jensen MD                                                   |
| Electronically signed: 2018 9:21 AM                                                 |
+------------------------------------------------------------------------------------------+
 
 
 
+---------------+---------+--------------------+--------------+
| Performing    | Address | City/State/Zipcode | Phone Number |
| Organization  |         |                    |              |
+---------------+---------+--------------------+--------------+
|   [facility] |         |                    |              |
+---------------+---------+--------------------+--------------+
 documented in this encounter
 
 Visit Diagnoses
 
 
+-------------------------------------------------------------------------------------+
| Diagnosis                                                                           |
+-------------------------------------------------------------------------------------+
|   Right knee pain, unspecified chronicity - Primary                                 |
+-------------------------------------------------------------------------------------+
|   Primary osteoarthritis of right knee  Primary localized osteoarthrosis, lower leg |
+-------------------------------------------------------------------------------------+
 documented in this encounter

## 2020-09-21 NOTE — XMS
Encounter Summary
  Created on: 2020
 
 SayraSusana
 External Reference #: [id number]
 : 48
 Sex: Female
 
 Demographics
 
 
+-----------------------+---------------------------+
| Address               | [address]            |
|                       | [place]  [address] |
+-----------------------+---------------------------+
| Home Phone            | [phone]           |
+-----------------------+---------------------------+
| Preferred Language    | Unknown                   |
+-----------------------+---------------------------+
| Marital Status        |                    |
+-----------------------+---------------------------+
| Catholic Affiliation | 1073                      |
+-----------------------+---------------------------+
| Race                  | White                     |
+-----------------------+---------------------------+
| Ethnic Group          | Not  or     |
+-----------------------+---------------------------+
 
 
 Author
 
 
+--------------+--------------------------------------------+
| Author       | [facility]  |
|              | [facility] [place]                                |
+--------------+--------------------------------------------+
| Organization | [facility]  |
|              | [facility] [place]                                |
+--------------+--------------------------------------------+
| Address      | Unknown                                    |
+--------------+--------------------------------------------+
| Phone        | Unavailable                                |
+--------------+--------------------------------------------+
 
 
 
 Support
 
 
+-------------+--------------+-------------------+-----------------+
| Name        | Relationship | Address           | Phone           |
+-------------+--------------+-------------------+-----------------+
| Eduar Colbert | ECON         | [address]       | [phone] |
|             |              | [place]   |                 |
|             |              | [id number]             |                 |
+-------------+--------------+-------------------+-----------------+
 
 
 
 
 Care Team Providers
 
 
+-------------------------+------+-----------------+
| Care Team Member Name   | Role | Phone           |
+-------------------------+------+-----------------+
| Tobin Alex MD | PCP  | [phone] |
+-------------------------+------+-----------------+
 
 
 
 Encounter Details
 
 
+--------+---------+----------------------+----------------------+-------------+
| Date   | Type    | Department           | Care Team            | Description |
+--------+---------+----------------------+----------------------+-------------+
| 04/14/ | Surgery |   [facility] |   Mahin Leiva MD | EGD         |
| 2017   |         |  [facility] |   [address], [place]  |             |
|        |         |   [address]       | [address]  [place], [place] |             |
|        |         | [place], [place]      |  [address]  [phone] |             |
|        |         | [address]           |   [phone] (Fax) |             |
|        |         | [phone]         |                      |             |
+--------+---------+----------------------+----------------------+-------------+
 
 
 
 Social History
 
 
+---------------+------------+-----------+--------+------------------+
| Tobacco Use   | Types      | Packs/Day | Years  | Date             |
|               |            |           | Used   |                  |
+---------------+------------+-----------+--------+------------------+
| Former Smoker | Cigarettes | 1.5       | 5      | Quit: 1979 |
+---------------+------------+-----------+--------+------------------+
 
 
 
+---------------------+---+---+---+
| Smokeless Tobacco:  |   |   |   |
| Never Used          |   |   |   |
+---------------------+---+---+---+
 
 
 
+-------------+----------------------+---------+------------------+
| Alcohol Use | Drinks/Week          | oz/Week | Comments         |
+-------------+----------------------+---------+------------------+
| Yes         |   1 Shots of liquor  | 1.0     | "social drinker" |
|             |  0 Standard drinks   |         |                  |
|             | or equivalent        |         |                  |
+-------------+----------------------+---------+------------------+
 
 
 
+------------------+---------------+
| Sex Assigned at  | Date Recorded |
| Birth            |               |
 
+------------------+---------------+
| Not on file      |               |
+------------------+---------------+
 documented as of this encounter
 
 Last Filed Vital Signs
 
 
+-------------------+---------------------+----------------------+----------+
| Vital Sign        | Reading             | Time Taken           | Comments |
+-------------------+---------------------+----------------------+----------+
| Blood Pressure    | 119/78              | 2017  8:39 AM  |          |
|                   |                     | PDT                  |          |
+-------------------+---------------------+----------------------+----------+
| Pulse             | 67                  | 2017  8:39 AM  |          |
|                   |                     | PDT                  |          |
+-------------------+---------------------+----------------------+----------+
| Temperature       | 36.4   C (97.5   F) | 2017  8:39 AM  |          |
|                   |                     | PDT                  |          |
+-------------------+---------------------+----------------------+----------+
| Respiratory Rate  | 18                  | 2017  8:39 AM  |          |
|                   |                     | PDT                  |          |
+-------------------+---------------------+----------------------+----------+
| Oxygen Saturation | 100%                | 2017  8:39 AM  |          |
|                   |                     | PDT                  |          |
+-------------------+---------------------+----------------------+----------+
| Inhaled Oxygen    | -                   | -                    |          |
| Concentration     |                     |                      |          |
+-------------------+---------------------+----------------------+----------+
| Weight            | 59.9 kg (132 lb)    | 2017  8:39 AM  |          |
|                   |                     | PDT                  |          |
+-------------------+---------------------+----------------------+----------+
| Height            | 167.6 cm (5' 6")    | 2017  8:39 AM  |          |
|                   |                     | PDT                  |          |
+-------------------+---------------------+----------------------+----------+
| Body Mass Index   | 21.31               | 2017  8:39 AM  |          |
|                   |                     | PDT                  |          |
+-------------------+---------------------+----------------------+----------+
 documented in this encounter
 
 Medications at Time of Discharge
 
 
+----------------------+----------------------+-----------+---------+----------+-----------+
| Medication           | Sig                  | Dispensed | Refills | Start    | End Date  |
|                      |                      |           |         | Date     |           |
+----------------------+----------------------+-----------+---------+----------+-----------+
|                      | Take 1,500 mg by     |           | 0       |          |           |
| Calcium-Magnesium-Vi | mouth.               |           |         |          |           |
| tamin D (CITRACAL    |                      |           |         |          |           |
| SLOW RELEASE)        |                      |           |         |          |           |
| 600-           |                      |           |         |          |           |
| MG-MG-UNIT TB24      |                      |           |         |          |           |
+----------------------+----------------------+-----------+---------+----------+-----------+
|   cholecalciferol    | Take 400 Units by    |           | 0       | 20 |           |
| (VITAMIN D-3) 400    | mouth Daily.         |           |         | 12       |           |
| units TABS           |                      |           |         |          |           |
+----------------------+----------------------+-----------+---------+----------+-----------+
|   ipratropium        | INSTILL TWO SPRAYS   |   15 mL   | 5       | 20 |           |
| (ATROVENT) 0.06%     | IN EACH NOSTRIL UP   |           |         | 17       |           |
 
| nasal spray          | TO FOUR TIMES DAILY  |           |         |          |           |
|                      | AS NEEDED            |           |         |          |           |
+----------------------+----------------------+-----------+---------+----------+-----------+
|   Polyethylene       | Apply  to eye Daily  |           | 0       |          |           |
| Glycol 400 (BLINK    | as needed.           |           |         |          |           |
| TEARS OP)            |                      |           |         |          |           |
+----------------------+----------------------+-----------+---------+----------+-----------+
|   estradiol          | Place 10 mcg         |           | 0       |          |  |
| (VAGIFEM) 10 mcg     | vaginally as needed. |           |         |          | 0         |
| vaginal tablet       |                      |           |         |          |           |
+----------------------+----------------------+-----------+---------+----------+-----------+
|   lovastatin         | Take 20 mg by mouth  |           | 0       |          |  |
| (MEVACOR) 20 mg      | nightly.             |           |         |          | 8         |
| tablet               |                      |           |         |          |           |
+----------------------+----------------------+-----------+---------+----------+-----------+
|   Lysine 500 MG CAPS | Take 500 mg by mouth |           | 0       |          |  |
|                      |  Daily.              |           |         |          | 0         |
+----------------------+----------------------+-----------+---------+----------+-----------+
|   metoprolol         | one tablet by mouth  |           | 1       | 20 |  |
| succinate            | daily                |           |         | 17       | 0         |
| (TOPROL-XL) 25 mg 24 |                      |           |         |          |           |
|  hr tablet           |                      |           |         |          |           |
+----------------------+----------------------+-----------+---------+----------+-----------+
|   mupirocin          | Apply topically to   |   22 g    | 1       | 20 |  |
| (BACTROBAN) 2%       | the nose as          |           |         | 15       | 0         |
| ointment             | directed.            |           |         |          |           |
+----------------------+----------------------+-----------+---------+----------+-----------+
|   rivaroxaban        | Take 20 mg by mouth  |           | 0       |          |  |
| (XARELTO) 20 mg      | Daily.               |           |         |          | 0         |
| tablet               |                      |           |         |          |           |
+----------------------+----------------------+-----------+---------+----------+-----------+
|   sertraline         | 1 and  tablet by  |           | 0       | 20 |  |
| (ZOLOFT) 50 mg       | mouth daily          |           |         | 12       | 0         |
| tablet               |                      |           |         |          |           |
+----------------------+----------------------+-----------+---------+----------+-----------+
 documented as of this encounter
 
 H&P Notes
 Mahin Leiva MD - 2017 10:29 AM PDTThe patient has no questions consent form is si
gned per request of her cardiologist 10 primary care internist she has remained on her Xarel
to.  Antibiotics have been administeredElectronically signed by Mahin Leiva MD at 
017 10:30 AM PDTMahin Leiva MD - 2017  8:57 AM PDTFormatting of this note might be
 different from the original.
 
                                           PRE-ENDOSCOPY HISTORY AND PRE-SEDATION           
                              ASSESSMENT
 
 PATIENT NAME: Susana Colbert     : 1950   
 
 TODAY'S DATE: 2017                
 
 PLANNED PROCEDURE: upper endoscopy and colonoscopy
 
 PERTINENT HISTORY/INDICATION FOR PROCEDURE:  Susana Colbert is a 66 y.o. female who
 is undergoing upper endoscopy and colonoscopy for evaluation of reflux and change in bowel 
habits. The patient has had a change in bowel pattern over the past year. She reports that
 she has looser stools up to 3-4 times a day 2-3 times a week. She has urgency and has had
 occasional episodes of fecal soiling. She denies nocturnal symptoms. She denies blood o
r mucus in the stool. She's had no abdominal pain. Foods that used to precipitate her sy
mptomatology include salads or roughage. She has avoided foods and has continued to have a
 
 change in her bowel pattern. She does not consume caffeinated beverages nonabsorbable sug
ars or simple sugars. She has had no change in her medications except for the addition of 
Xarelto which has been for several years. The patient does have a history of adenomatous p
olyps removed from the hepatic flexure and . The patient previously had constipation w
hich was successfully treated with probiotics
 
 The patient also has symptoms of reflux. Typical retrosternal burning usually associated 
with spicy foods or eating late at night. She is on no prescription medications for the sa
me but does take Gaviscon on a regular basis. She denies dysphagia or odontophagia denies 
symptoms of aspiration supra-esophageal manifestations of reflux such as voice change aspira
tion etc. upper endoscopy in  was positive for gastroparesis. Biopsies of the esophagu
s were positive for reflux negative for Whitfield's metaplasia; patient previously was on Prot
christiana but has not been on the same for approximately a year.
 
 Review of the chart shows total knee replacement 2016. Her orthopedic surgeon told t
he patient that she should take prophylactic antibiotics. Atrial fibrillation,flutter on c
hronic anticoagulation Xarelto past history of ablation; anxiety depression. The patient r
eports that any stress precipitates an onset of atrial fibrillation and is concerned that th
e prep and/or procedure may precipitate the same.Will have patient remain on the Xaralto d
ue to atrial flutter. 
 
 PAST HISTORY:  
 Past Medical History 
 Diagnosis Date 
   Anomalous atrioventricular excitation 2014 
   Depression with anxiety 2014 
   Essential hypertension 2014 
   Hyperlipidemia 2014 
   Internal derangement of knee 2014 
   Osteopenia 2014 
   Postmenopausal disorder 2014 
   Unspecified essential hypertension 2014 
   Disorder of bone and cartilage, unspecified 2014 
   Unspecified internal derangement of knee 2014 
   Atrial fibrillation (HCC) 2014 
   Esophageal reflux 2014 
   Arthritis  
   Esophageal reflux  
   Atrial flutter (HCC)  
 
 PAST SURGICAL HISTORY 
 Past Surgical History 
 Procedure Laterality Date 
   Atrial ablation surgery   
   Dr. Wilson 
   Knee arthroscopy Left  
   Knee joint replacement Left 2015 
   Dr. Mike 
   Cataract removal   
   Vagina surgery   
   Colonoscopy   
   Tonsillectomy   
 
 HOME MEDS:  
 Prior to Admission medications  
 Medication Sig Taking? 
 Calcium-Magnesium-Vitamin D (CITRACAL SLOW RELEASE) 600- MG-MG-UNIT TB24 Take 1,500 m
g by mouth.  
 cholecalciferol (VITAMIN D-3) 400 units TABS Take 400 Units by mouth Daily.
 Patient taking differently: Take 2,000 Units by mouth Daily.  
 
 estradiol (VAGIFEM) 10 mcg vaginal tablet Place 10 mcg vaginally Daily.  
 ipratropium (ATROVENT) 0.06% nasal spray INSTILL TWO SPRAYS IN EACH NOSTRIL UP TO FOUR TIME
S DAILY AS NEEDED  
 lovastatin (MEVACOR) 20 mg tablet Take 20 mg by mouth nightly.  
 Lysine 500 MG CAPS Take 1,000 mg by mouth Daily.  
 mupirocin (BACTROBAN) 2% ointment Apply topically to the nose as directed.  
 Polyethylene Glycol 400 (BLINK TEARS OP) Apply  to eye 2 times daily.  
 rivaroxaban (XARELTO) 20 mg tablet Take 20 mg by mouth Every other day.  
 sertraline (ZOLOFT) 50 mg tablet 1 and 1/2 tablet by mouth daily
 Patient taking differently: Take 125 mg by mouth Daily.  
 
 ALLERGIES
 Allergies 
 Allergen Reactions 
   Moexipril Hydrochloride Shortness Of Breath 
   AKA Univasc 
   Metoclopramide Other (See Comments) 
   depression 
 
 ASA CLASSIFICATION:3
 
 EXAMINATION:
 Blood pressure 119/78, pulse 67, temperature 36.4 C (97.5 F), temperature source Tempor
al, resp. rate 18, height 1.676 m (5' 6"), weight 59.875 kg (132 lb), SpO2 100 %.
 General: Alert and orientedx3
 Throat: Normal
 Lungs: Clear
 Heart: Regular rate and rhythm with out significant murmur
 Abdomen: flat, normal bowel sounds. Soft, nontender 
 
 1. Available medical records have been reviewed.  [facility] 2017
 2. Medication list reviewed.
 
 IMPRESSION:  history of Whitfield's metaplasia of the general reflux history of colonic polyp
s.  Patient appropriate for procedures.
 
 PLAN:
 1. Proceed with procedure as stated above with propofol sedation/analgesia due to atrial fl
utter on Xeralto. 
 2. Procedure, indications, risks and alternatives explained to patient/family and they agre
ed to proceed and consent was signed.
 3. Patient will be reevaluated immediately (1-2 minutes) before sedation administration and
 approved for the plan as stated above.
  
 Electronically Signed by:  
 Mahin Leiva MD
 2017 
 [facility]
 
 Portions of this chart may have been created with Dragon voice recognition software. Occasi
onal wrong-word or   sound-alike  substitutions may have occurred due to the inherent neumann
itations of voice recognition software. Please read the chart carefully and recognize, using
 context, where these substitutions have occurred
 Electronically signed by Mahin Leiva MD at 2017 10:23 AM PDTdocumented in this enc
ounter
 
 Miscellaneous Notes
 Op Note - Mahin Leiva MD - 2017 11:11 AM PDTUpper endoscopy was remarkable for in
 a regular Z
 and 38 cm.  Biopsies were obtained did NBI images showing possible Whitfield's metaplasia.  T
 
here was no excessive bleeding noted with esophageal biopsies.  Gastroparesis was present H.
 pylori biopsy was obtained.  Duodenal mucosa.  Normal but biopsies obtained for celiac spru
e per protocol.  Colonoscopy was remarkable for Joshua redundancy of entire colon.  Mucosal se
rvices appeared normal.  Right and left colonic biopsies were obtained for microscopic colit
is colitis as were stool for stool studies.  It is noted that multiple biopsies were obtaine
d after initial test biopsy for H. pylori showed no excessive bleeding.  No excessive bleedi
ng was noted in multiple biopsy sitesElectronically signed by Mahin Leiva MD at 
7 11:12 AM PDTD-C Instructions Provation - Mahin Leiva MD - 2017 10:20 AM PDTDisch
arge Instructions for Upper Endoscopy 
 Patient:  Susana Colbert  
 :  1950          
 MRN: [id number]
 Acct:  [id number]
 Exam Date:  2017                                             
 
 Doctor:  Mahin Leiva MD  
 The chances of difficulty following this procedure are minimal.  The following  instruction
s will assist you in your recovery.
 1.  Do Not eat or drink anything for 1 hour.  Try sips of water first.  If  tolerated, resu
me your regular diet or one recommended by your physician.  2.  Do not drive, operate clint
carlos, make critical decisions, or do activities  that require coordination or balance for 24 
hours.
 3.  You may experience a sore throat for 24 - 48 hours.  You may use throat  lozenges or ga
rgle with warm salt water to relieve the discomfort.  4.  Because air was put into your stom
ach druing the procedure, you may  experience some belching.
 5.  Do not use any medication containing aspirin for 10 days, unless otherwise  directed by
 your physician.
 6.  Sometimes the medications given to you druing the exam can aggravate the  veins.  The c
hemical irritation can cause inflammation or pain along the arm  with redness, swelling and 
warmth.  This does not mean there is an infection.   You can treat the affected area by appl
chuck warm, wet compresses (towels) 4  times a day for 20 minutes at a time until inflammatio
n is resolved  7.  Report to your doctor: 
 Chills and/or fever over 100
 Persistent vomiting or vomiting with blood/nasal regurgitation  Severe abdominal pain, othe
r than gas cramps
 Severe chest pain
 Black, tarry stools
 You may reach your physician at Work:  [phone].  If unable to reach your  physician,
 call [facility] Department at [phone]  Ext. [phone]
 Your doctor recommends these additional instructions:
 You have a contact number available for emergencies.  The signs and symptoms of  potential 
delayed complications were discussed with you.  You may return to  normal activities tomorro
w.  Written discharge instructions were provided to  you. 
 You are being discharged to home. 
 Resume your previous diet today. 
 Continue your present medications. 
 We are waiting for your pathology results. 
 Your physician has recommended a colonoscopy today. 
 Return to your primary care physician as previously scheduled.  Telephone your GI clinic fo
r pathology results in one week.  Telephone your GI clinic if symptoms are present.
 These instructions have been explained to the patient and/or escort.  A copy has  been give
n to the patient/escort.
 _____________________________________      ________________________________  Nurse Signatur
e                                                  Patient  Signature
 _____________________________________      ________________________________  Escort Signatu
re                                                 Date  Mahin Leiva MD
 2017 11:18:53 AM
 This report has been signed electronically.Electronically signed by Mahin Leiva MD at  11:19 AM PDTD-C Instructions Provation - Mahin Leiva MD - 2017 10:10 AM P
DTDischarge Instructions for Colonoscopy Exams 
 
 Patient:  Susana Colbert  
 :  1950      
 MRN: [id number]
 Acct:  [id number]
 Exam Date:  2017                                             
 
 Doctor:  Mahin Leiva MD
 You have had an examination of the gastrointestinal tract.  The chances of  difficulty foll
owing this procedure are minimal.  The following instructions  will assist you in your recov
carlos.
 ACTIVITIES:
 Rest quietly until sedation wears off.
 DO NOT drive a motor vehicle or operate machinery for 24 hours after sedation.  Be cautious
 making critical decisions for 24 hours after sedation.  DIET:
 If throat has been sprayed, do not eat or drink for 1 hour after.  Start with a swallow of 
tap water, if you experience any lack of sensation in  your throat, wait another 30 - 60 min
utes and start with water again.  Once swallowing has returned to normal you may resume your
 usual diet unless  otherwise instructed by your physician.
 DISCOMFORT:
 If you had a bowel exam, you may have some abdominal discomfort from the air put  into your
 bowel during the exam.  Moving about will help you pass this air.  Sometimes the medication
s given to you during the exam can aggravate the veins.   The chemical irritation can cause 
inflammation or pain along the arm with  redness, swelling and warmth.  This does not mean t
here is an infection.  You  can treat the affected area by applying warm,wet compresses (tow
els) 4 times a  day for 20 minutes at a time until inflammation is resolved.  REPORT TO YOUR
 DOCTOR:
 Unusual abdominal pain
 Chest pain or unusual shortness of breath
 Shoulder pain
 Nausea, vomiting
 Fever over 100 degrees, chills
 Signs of rectal bleeding (red or black stools)
 Any concern you have resulting from procedure
 You may reach your physician at Work:  [phone].  If unable to reach your  physician,
 call [facility] Emergency Department at [phone]  Ext. [phone]
 Your doctor recommends these additional instructions:
 You have a contact number available for emergencies.  The signs and symptoms of  potential 
delayed complications were discussed with you.  You may return to  normal activities tomorro
w.  Written discharge instructions were provided to  you. 
 You are being discharged to home. 
 Resume your previous diet today. 
 Continue your present medications. 
 We are waiting for your pathology results. 
 Return to your primary care physician as previously scheduled.  Telephone your GI clinic fo
r pathology results in one week.  Telephone your GI clinic if symptoms are present.
 These instructions have been explained to the patient and/or escort.  A copy has  been give
n to the patient/escort.
 _____________________________________      ________________________________  Nurse Signatbarrington
e                                                  Patient  Signature
 _____________________________________      ________________________________  Escort Signatu
re                                                 Date  Mahin Leiva MD
 2017 11:28:47 AM
 This report has been signed electronically.Electronically signed by Mahin Leiva MD at  11:29 AM PDTdocumented in this encounter
 
 Plan of Treatment
 
 
+--------+---------+-----------+----------------------+-------------+
| Date   | Type    | Specialty | Care Team            | Description |
 
+--------+---------+-----------+----------------------+-------------+
| 10/12/ | Office  | Neurology |   Jamin Brooks MD  [address] |             |
| 2020   | Visit   |           |  [address]      |             |
|        |         |           | [place]  |             |
|        |         |           | [place] [address]             |             |
|        |         |           | [phone]         |             |
|        |         |           | [phone] (Fax)   |             |
+--------+---------+-----------+----------------------+-------------+
 documented as of this encounter
 
 Procedures
 
 
+----------------------+--------+-------------+----------------------+----------------------
+
| Procedure Name       | Priori | Date/Time   | Associated Diagnosis | Comments             
|
|                      | ty     |             |                      |                      
|
+----------------------+--------+-------------+----------------------+----------------------
+
| LACTOFERRIN, FECAL,  | Routin | 2017  |                      |   Results for this   
|
| QUAL                 | e      | 10:50 AM    |                      | procedure are in the 
|
|                      |        | PDT         |                      |  results section.    
|
+----------------------+--------+-------------+----------------------+----------------------
+
| CAMPYLOBACTER        | Routin | 2017  |                      |   Results for this   
|
| AG,QUAL              | e      | 10:49 AM    |                      | procedure are in the 
|
|                      |        | PDT         |                      |  results section.    
|
+----------------------+--------+-------------+----------------------+----------------------
+
| CULTURE, STOOL       | Routin | 2017  |                      |   Results for this   
|
| RESULT               | e      | 10:49 AM    |                      | procedure are in the 
|
|                      |        | PDT         |                      |  results section.    
|
+----------------------+--------+-------------+----------------------+----------------------
+
| SHIGATOXIN 1 AND 2   | Routin | 2017  |                      |   Results for this   
|
|                      | e      | 10:49 AM    |                      | procedure are in the 
|
|                      |        | PDT         |                      |  results section.    
|
+----------------------+--------+-------------+----------------------+----------------------
+
| OVA AND PARASITE     | Routin | 2017  |                      |   Results for this   
|
| EXAMINATION          | e      | 10:49 AM    |                      | procedure are in the 
|
|                      |        | PDT         |                      |  results section.    
|
+----------------------+--------+-------------+----------------------+----------------------
 
+
| CRYPTOSPORIDIUM AG   | Routin | 2017  |                      |   Results for this   
|
|                      | e      | 10:49 AM    |                      | procedure are in the 
|
|                      |        | PDT         |                      |  results section.    
|
+----------------------+--------+-------------+----------------------+----------------------
+
| GIARDIA AG, EIA,     | Routin | 2017  |                      |   Results for this   
|
| STOOL                | e      | 10:49 AM    |                      | procedure are in the 
|
|                      |        | PDT         |                      |  results section.    
|
+----------------------+--------+-------------+----------------------+----------------------
+
| CLOSTRIDIUM          | Routin | 2017  |                      |   Results for this   
|
| DIFFICILE A AND B    | e      | 10:49 AM    |                      | procedure are in the 
|
| EIA                  |        | PDT         |                      |  results section.    
|
+----------------------+--------+-------------+----------------------+----------------------
+
| CULTURE, STOOL       | Routin | 2017  |                      |   Results for this   
|
|                      | e      | 10:49 AM    |                      | procedure are in the 
|
|                      |        | PDT         |                      |  results section.    
|
+----------------------+--------+-------------+----------------------+----------------------
+
| HELICOBACTER PYLORI  | Routin | 2017  |                      |   Results for this   
|
| BIOPSY               | e      | 10:34 AM    |                      | procedure are in the 
|
|                      |        | PDT         |                      |  results section.    
|
+----------------------+--------+-------------+----------------------+----------------------
+
| COLONOSCOPY          |        | 2017  |   Gastroesophageal   |                      
|
|                      |        | 10:25 AM    | reflux disease,      |                      
|
|                      |        | PDT         | esophagitis presence |                      
|
|                      |        |             |  not specified       |                      
|
|                      |        |             | (K21.9), Diarrhea,   |                      
|
|                      |        |             | unspecified type     |                      
|
|                      |        |             | (R19.7), Atrial      |                      
|
|                      |        |             | fibrillation,        |                      
|
|                      |        |             | unspecified type     |                      
|
|                      |        |             | ([facility]) (I48.91),      |                      
 
|
|                      |        |             | Personal history of  |                      
|
|                      |        |             | colonic polyps       |                      
|
|                      |        |             | (Z86.010), Advanced  |                      
|
|                      |        |             | Age (R54)            |                      
|
+----------------------+--------+-------------+----------------------+----------------------
+
| EGD                  |        | 2017  |   Gastroesophageal   |                      
|
|                      |        | 10:25 AM    | reflux disease,      |                      
|
|                      |        | PDT         | esophagitis presence |                      
|
|                      |        |             |  not specified       |                      
|
|                      |        |             | (K21.9), Diarrhea,   |                      
|
|                      |        |             | unspecified type     |                      
|
|                      |        |             | (R19.7), Atrial      |                      
|
|                      |        |             | fibrillation,        |                      
|
|                      |        |             | unspecified type     |                      
|
|                      |        |             | ([facility]) (I48.91),      |                      
|
|                      |        |             | Personal history of  |                      
|
|                      |        |             | colonic polyps       |                      
|
|                      |        |             | (Z86.010), Advanced  |                      
|
|                      |        |             | Age (R54)            |                      
|
+----------------------+--------+-------------+----------------------+----------------------
+
| EGD                  | Routin | 2017  |                      |   Results for this   
|
|                      | e      | 10:20 AM    |                      | procedure are in the 
|
|                      |        | PDT         |                      |  results section.    
|
+----------------------+--------+-------------+----------------------+----------------------
+
| ECG 12 LEAD          | STAT   | 2017  |                      |   Results for this   
|
|                      |        | 10:16 AM    |                      | procedure are in the 
|
|                      |        | PDT         |                      |  results section.    
|
+----------------------+--------+-------------+----------------------+----------------------
+
| COLONOSCOPY          | Routin | 2017  |                      |   Results for this   
|
|                      | e      | 10:10 AM    |                      | procedure are in the 
 
|
|                      |        | PDT         |                      |  results section.    
|
+----------------------+--------+-------------+----------------------+----------------------
+
| SURGICAL PATHOLOGY   | Routin | 2017  |                      |   Results for this   
|
| EXAM                 | e      | 12:00 AM    |                      | procedure are in the 
|
|                      |        | PDT         |                      |  results section.    
|
+----------------------+--------+-------------+----------------------+----------------------
+
 documented in this encounter
 
 Results
 Lactoferrin, Fecal, Qual (2017 10:50 AM PDT)
 
+-------------+----------+-----------+-------------+--------------+
| Component   | Value    | Ref Range | Performed   | Pathologist  |
|             |          |           | At          | Signature    |
+-------------+----------+-----------+-------------+--------------+
| Lactoferrin | Negative | Negative  | [facility]  |              |
| , Qual      |          |           | [facility]    |              |
|             |          |           | MEDICAL     |              |
|             |          |           | CENTER -    |              |
|             |          |           | LABORATORY  |              |
+-------------+----------+-----------+-------------+--------------+
 
 
 
+---------------------+
| Specimen            |
+---------------------+
| Stool - Stool       |
| specimen (specimen) |
+---------------------+
 
 
 
+----------------------+--------------------+--------------------+----------------+
| Performing           | Address            | City/State/Zipcode | Phone Number   |
| Organization         |                    |                    |                |
+----------------------+--------------------+--------------------+----------------+
|   [facility]     |   [address] |   [place]  |   [phone] |
| [facility]  |                    | [address]              |                |
| - LABORATORY         |                    |                    |                |
+----------------------+--------------------+--------------------+----------------+
 Campylobacter Ag,Qual (2017 10:49 AM PDT)
 
+-------------+--------------------------+-----------+-------------+--------------+
| Component   | Value                    | Ref Range | Performed   | Pathologist  |
|             |                          |           | At          | Signature    |
+-------------+--------------------------+-----------+-------------+--------------+
| Campylobact | Positive (A)Comment:     | Negative  | [facility]  |              |
| er AG, Qual | Result called to and     |           | [facility]    |              |
|             | read back by [facility]       |           | MEDICAL     |              |
|             | Dex on 2017  |           | CENTER -    |              |
|             | at 12:56 by Gilmar Farmer. |           | LABORATORY  |              |
|             |   **THIS IS A REPORTABLE |           |             |              |
 
|             |  DISEASE**Please report  |           |             |              |
|             | to [facility]    |           |             |              |
|             | Department at            |           |             |              |
|             | [phone] within 24   |           |             |              |
|             | hours.                   |           |             |              |
+-------------+--------------------------+-----------+-------------+--------------+
 
 
 
+---------------------+
| Specimen            |
+---------------------+
| Stool - Stool       |
| specimen (specimen) |
+---------------------+
 
 
 
+----------------------+--------------------+--------------------+----------------+
| Performing           | Address            | City/State/Zipcode | Phone Number   |
| Organization         |                    |                    |                |
+----------------------+--------------------+--------------------+----------------+
|   [facility]     |   [address] |   [place]  |   [phone] |
| [facility]  |                    | [address]              |                |
| - LABORATORY         |                    |                    |                |
+----------------------+--------------------+--------------------+----------------+
 Culture, Stool Result (2017 10:49 AM PDT)
 
+-----------+--------------------------+-----------+-------------+--------------+
| Component | Value                    | Ref Range | Performed   | Pathologist  |
|           |                          |           | At          | Signature    |
+-----------+--------------------------+-----------+-------------+--------------+
| Culture   | No Salmonella, Shigella, |           | [facility]  |              |
|           |  Aeromonas, Plesiomonas, |           | [facility]    |              |
|           |  E. coli O157 or         |           | MEDICAL     |              |
|           | Yersinia isolated.       |           | CENTER -    |              |
|           |                          |           | LABORATORY  |              |
+-----------+--------------------------+-----------+-------------+--------------+
| Culture   | 1+ Mixed Gram Positive   |           | [facility]  |              |
|           | FloraComment: Consistent |           | [facility]    |              |
|           |  with usual enteric      |           | MEDICAL     |              |
|           | emanuel.No gram negative   |           | CENTER -    |              |
|           | enteric emanuel isolated   |           | LABORATORY  |              |
+-----------+--------------------------+-----------+-------------+--------------+
 
 
 
+---------------------+
| Specimen            |
+---------------------+
| Stool - Stool       |
| specimen (specimen) |
+---------------------+
 
 
 
+----------------------+--------------------+--------------------+----------------+
| Performing           | Address            | City/State/Zipcode | Phone Number   |
| Organization         |                    |                    |                |
+----------------------+--------------------+--------------------+----------------+
 
|   [facility]     |   [address] |   [place], [place]  |   [phone] |
| [facility]  |                    | [address]              |                |
| - LABORATORY         |                    |                    |                |
+----------------------+--------------------+--------------------+----------------+
 Shigatoxin 1 and 2 (2017 10:49 AM PDT)
 
+-------------+----------+-----------+-------------+--------------+
| Component   | Value    | Ref Range | Performed   | Pathologist  |
|             |          |           | At          | Signature    |
+-------------+----------+-----------+-------------+--------------+
| Shigatoxin  | Negative | Negative  | [facility]  |              |
| 1           |          |           | [facility]    |              |
|             |          |           | MEDICAL     |              |
|             |          |           | CENTER -    |              |
|             |          |           | LABORATORY  |              |
+-------------+----------+-----------+-------------+--------------+
| Shigatoxin  | Negative | Negative  | [facility]  |              |
| 2           |          |           | [facility]    |              |
|             |          |           | MEDICAL     |              |
|             |          |           | CENTER -    |              |
|             |          |           | LABORATORY  |              |
+-------------+----------+-----------+-------------+--------------+
 
 
 
+---------------------+
| Specimen            |
+---------------------+
| Stool - Stool       |
| specimen (specimen) |
+---------------------+
 
 
 
+----------------------+--------------------+--------------------+----------------+
| Performing           | Address            | City/State/Zipcode | Phone Number   |
| Organization         |                    |                    |                |
+----------------------+--------------------+--------------------+----------------+
|   [facility]     |   [address] |   [place]  |   [phone] |
| [facility]  |                    | [address]              |                |
| - LABORATORY         |                    |                    |                |
+----------------------+--------------------+--------------------+----------------+
 Ova and Parasite Examination (2017 10:49 AM PDT)
 
+-------------+--------------------------+-----------+------------+--------------+
| Component   | Value                    | Ref Range | Performed  | Pathologist  |
|             |                          |           | At         | Signature    |
+-------------+--------------------------+-----------+------------+--------------+
| O/P IDENT   | See CommentsComment:     |           | [facility]  |              |
|             | Accession No.            |           | [facility]   |              |
|             |                          |           |            |              |
|             |   [id number]       |           |            |              |
|             | Source                   |           |            |              |
|             |          StoolResult     |           |            |              |
|             |                          |           |            |              |
|             |             Fecal        |           |            |              |
|             | leukocytes seen          |           |            |              |
|             |                          |           |            |              |
|             |                 No Ova   |           |            |              |
|             | or Parasites seen        |           |            |              |
 
|             |                          |           |            |              |
|             |                   This   |           |            |              |
|             | test will not detect     |           |            |              |
|             | Cyclospora,              |           |            |              |
|             |                          |           |            |              |
|             |                          |           |            |              |
|             |   Cryptosporidium or     |           |            |              |
|             | Cystoisospora            |           |            |              |
|             |                          |           |            |              |
|             |                          |           |            |              |
|             |   (Isospora). For these  |           |            |              |
|             | organisms                |           |            |              |
|             |                          |           |            |              |
|             |             refer to     |           |            |              |
|             | Coccidia Stain (test     |           |            |              |
|             | code                     |           |            |              |
|             |                          |           |            |              |
|             |         [facility]).          |           |            |              |
+-------------+--------------------------+-----------+------------+--------------+
| O/P REPORT  | Report Status            |           | [facility]FER[facility]  |              |
| STAT        |          Final           |           | [facility]   |              |
|             | 2017Comment:       |           |            |              |
|             | Testing Performed:       |           |            |              |
|             | [facility] [place]  |           |            |              |
|             | [facility], [address]    |           |            |              |
|             | [facility] [place] [place] [address]   |           |            |              |
+-------------+--------------------------+-----------+------------+--------------+
 
 
 
+--------------------+
| Specimen           |
+--------------------+
| Stool specimen     |
| (specimen) - Stool |
+--------------------+
 
 
 
+----------------------+----------------------+---------------------+----------------+
| Performing           | Address              | City/State/Zipcode  | Phone Number   |
| Organization         |                      |                     |                |
+----------------------+----------------------+---------------------+----------------+
|   [facility] |   [address] |   [place] [address] |   [phone] |
+----------------------+----------------------+---------------------+----------------+
 Clostridium difficile A and B EIA (2017 10:49 AM PDT)
 
+-------------+--------------------------+-----------+-------------+--------------+
| Component   | Value                    | Ref Range | Performed   | Pathologist  |
|             |                          |           | At          | Signature    |
+-------------+--------------------------+-----------+-------------+--------------+
| Clostridium | NegativeComment:         | Negative  | [facility]  |              |
|  Difficile  | Negative for toxigenic   |           | [facility]    |              |
| [facility] Antigen | Clostridium difficile    |           | MEDICAL     |              |
|             |                          |           | CENTER -    |              |
|             |                          |           | LABORATORY  |              |
+-------------+--------------------------+-----------+-------------+--------------+
| C. Diff     | NegativeComment: No data | Negative  | [facility]  |              |
| Toxin A/B   |  exists on the effects   |           | [facility]    |              |
| EIA         | of colonic washes,       |           | MEDICAL     |              |
 
|             | barium enemas,           |           | CENTER -    |              |
|             | laxatives, or bowel      |           | LABORATORY  |              |
|             | preparations on the      |           |             |              |
|             | performance of this      |           |             |              |
|             | test. All of these       |           |             |              |
|             | procedures can result in |           |             |              |
|             |  extensive dilution or   |           |             |              |
|             | the presence of          |           |             |              |
|             | additives that may       |           |             |              |
|             | affect test performance. |           |             |              |
+-------------+--------------------------+-----------+-------------+--------------+
 
 
 
+---------------------+
| Specimen            |
+---------------------+
| Stool - Stool       |
| specimen (specimen) |
+---------------------+
 
 
 
+----------------------+--------------------+--------------------+----------------+
| Performing           | Address            | City/State/Zipcode | Phone Number   |
| Organization         |                    |                    |                |
+----------------------+--------------------+--------------------+----------------+
|   [facility]     |   [address] |   [place]  |   [phone] |
| [facility]  |                    | [address]              |                |
| - LABORATORY         |                    |                    |                |
+----------------------+--------------------+--------------------+----------------+
 Giardia Ag, EIA, Stool (2017 10:49 AM PDT)
 
+-----------+----------+-----------+-------------+--------------+
| Component | Value    | Ref Range | Performed   | Pathologist  |
|           |          |           | At          | Signature    |
+-----------+----------+-----------+-------------+--------------+
| Giardia   | Negative | Negative  | [facility]  |              |
| Antigen,  |          |           | [facility]    |              |
| Stool     |          |           | MEDICAL     |              |
|           |          |           | CENTER -    |              |
|           |          |           | LABORATORY  |              |
+-----------+----------+-----------+-------------+--------------+
 
 
 
+---------------------+
| Specimen            |
+---------------------+
| Stool - Stool       |
| specimen (specimen) |
+---------------------+
 
 
 
+----------------------+--------------------+--------------------+----------------+
| Performing           | Address            | City/State/Zipcode | Phone Number   |
| Organization         |                    |                    |                |
+----------------------+--------------------+--------------------+----------------+
|   [facility]     |   [address] |   [place]  |   [phone] |
 
| [facility]  |                    | [address]              |                |
| - LABORATORY         |                    |                    |                |
+----------------------+--------------------+--------------------+----------------+
 Cryptosporidium Ag (2017 10:49 AM PDT)
 
+-------------+----------+-----------+-------------+--------------+
| Component   | Value    | Ref Range | Performed   | Pathologist  |
|             |          |           | At          | Signature    |
+-------------+----------+-----------+-------------+--------------+
| Cryptospori | Negative | Negative  | [facility]  |              |
| dium        |          |           | [facility]    |              |
| Antigen     |          |           | MEDICAL     |              |
|             |          |           | CENTER -    |              |
|             |          |           | LABORATORY  |              |
+-------------+----------+-----------+-------------+--------------+
 
 
 
+---------------------+
| Specimen            |
+---------------------+
| Stool - Stool       |
| specimen (specimen) |
+---------------------+
 
 
 
+----------------------+--------------------+--------------------+----------------+
| Performing           | Address            | City/State/Zipcode | Phone Number   |
| Organization         |                    |                    |                |
+----------------------+--------------------+--------------------+----------------+
|   [facility]     |   [address] |   [place]  |   [phone] |
| [facility]  |                    | [address]              |                |
| - LABORATORY         |                    |                    |                |
+----------------------+--------------------+--------------------+----------------+
 Helicobactor pylori Biopsy (2017 10:34 AM PDT)
 
+-------------+----------+-----------+-------------+--------------+
| Component   | Value    | Ref Range | Performed   | Pathologist  |
|             |          |           | At          | Signature    |
+-------------+----------+-----------+-------------+--------------+
| Helicobacte | Negative | Negative  | [facility]  |              |
| r pylori Ag |          |           | [facility]    |              |
|             |          |           | MEDICAL     |              |
|             |          |           | CENTER -    |              |
|             |          |           | LABORATORY  |              |
+-------------+----------+-----------+-------------+--------------+
 
 
 
+----------------------+
| Specimen             |
+----------------------+
| Tissue - Entire      |
| pyloric antrum (body |
|  structure)          |
+----------------------+
 
 
 
 
+----------------------+--------------------+--------------------+----------------+
| Performing           | Address            | City/State/Zipcode | Phone Number   |
| Organization         |                    |                    |                |
+----------------------+--------------------+--------------------+----------------+
|   [facility]     |   [address] |   [place]  |   [phone] |
| [facility]  |                    | [address]              |                |
| - LABORATORY         |                    |                    |                |
+----------------------+--------------------+--------------------+----------------+
 EGD (2017 10:20 AM PDT)
 
+----------+
| Specimen |
+----------+
|          |
+----------+
 
 
 
+-----------------------------------------------------------------------------+-------------
-+
| Narrative                                                                   | Performed At
 |
+-----------------------------------------------------------------------------+-------------
-+
|   This result has an attachment that is not available.                      |   [facility]      
 |
| GastroenterologyPatient Name: Susana Jannette Date: 2017         | [facility]   
 |
| 10:20 AMMRN: [id number] #: [id number] of Birth:                |             
 |
| 9Admit Type: AmbulatoryAge: 66Room: [facility] 01Gender: FemaleNote      |             
 |
|  Status: FinalizedAttending MD: KAMRON Ortegarocedure:               |             
 |
|     Upper GI endoscopyIndications:          Esophageal reflux,              |             
 |
| Follow-up of esophageal reflux, DiarrheaProviders:             Mahin        |             
 |
| ARLEY Leiva MD, Sarah Beth Kowalski RN, Shiv CHOWDARY                         |             
 |
|           WOLFGANG Cross Richard Randall Wick, MD (Anesthesia                    |             
 |
| Staff)Referring MD:         Tobin Alex MD (Referring                |             
 |
| MD)Medicines:             Sedation Required Anesthesia Staff                |             
 |
| AssistanceComplications:       No immediate complications. Estimated        |             
 |
| blood loss: Minimal.Procedure:       Pre-Anesthesia Assessment:             |             
 |
| - Prior to the procedure, a History and Physical was performed, and         |             
 |
|       patient medications, allergies and sensitivities were reviewed.       |             
 |
| The        patient's tolerance of previous anesthesia was reviewed.         |             
 |
|     - Prior to the procedure, a History and Physical was performed,         |             
 |
| and        patient medications and allergies were reviewed. The             |             
 |
 
| patient is        competent. The risks and benefits of the procedure        |             
 |
| and the sedation        options and risks were discussed with the           |             
 |
| patient. All questions were        answered and informed consent was        |             
 |
| obtained. Patient identification and        proposed procedure were         |             
 |
| verified by the physician, the nurse, the        anesthesiologist and       |             
 |
| the technician in the endoscopy suite. Mental        Status                 |             
 |
| Examination: alert and oriented. Airway Examination: normal                 |             
 |
| oropharyngeal airway and neck mobility and Mallampati Class II (the         |             
 |
|       uvula but not tonsillar pillars visualized). Respiratory              |             
 |
| Examination:        clear to auscultation. CV Examination: regular          |             
 |
| rate and rhythm.        Prophylactic Antibiotics: The patient requires      |             
 |
|  prophylactic antibiotics        due to a prior history of prosthetic       |             
 |
| joint replacement. Prior        Anticoagulants: The patient has taken       |             
 |
| Xarelto (rivaroxaban), last dose        was day of procedure. ASA           |             
 |
| Grade Assessment: III - A patient with severe        systemic disease.      |             
 |
|  After reviewing the risks and benefits, the patient        was deemed      |             
 |
|  in satisfactory condition to undergo the procedure. The                    |             
 |
| anesthesia plan was to use monitored anesthesia care (MAC).                 |             
 |
| Immediately        prior to administration of medications, the patient      |             
 |
|  was re-assessed for        adequacy to receive sedatives. The heart        |             
 |
| rate, respiratory rate, oxygen        saturations, blood pressure,          |             
 |
| adequacy of pulmonary ventilation, and        response to care were         |             
 |
| monitored throughout the procedure. The physical        status of the       |             
 |
| patient was re-assessed after the procedure.       - After reviewing        |             
 |
| the risks and benefits, the patient was deemed in        satisfactory       |             
 |
| condition to undergo the procedure.       - Using IV propofol under         |             
 |
| the supervision of an anesthesiologist was        determined to be          |             
 |
| medically necessary for this procedure based on age 64        or            |             
 |
| older, severe comorbidity (greater than ASA Grade II), prolonged            |             
 |
|    procedure requiring deep sedation and patient's history of problems      |             
 |
 
|  with        anesthesia.       - Immediately prior to administration        |             
 |
| of medications, the patient was        re-assessed for adequacy to          |             
 |
| receive sedatives.       - The heart rate, respiratory rate, oxygen         |             
 |
| saturations, blood pressure,        adequacy of pulmonary ventilation,      |             
 |
|  and response to care were monitored        throughout the procedure.       |             
 |
|       - The physical status of the patient was re-assessed after the        |             
 |
| procedure.       After obtaining informed consent, the endoscope was        |             
 |
| passed under direct        vision. Throughout the procedure, the            |             
 |
| patient's blood pressure, pulse,        and oxygen saturations were         |             
 |
| monitored continuously. The Endoscope was        introduced through         |             
 |
| the mouth, and advanced to the third part of        duodenum. The           |             
 |
| upper GI endoscopy was accomplished without difficulty.        The          |             
 |
| patient tolerated the procedure well.Findings:       The                    |             
 |
| cricopharyngeus, upper third of the esophagus, middle third of the          |             
 |
|     esophagus and lower third of the esophagus were normal.       The       |             
 |
| Z-line was irregular and was found 38 cm from the incisors. Biopsies        |             
 |
|       were taken with a cold forceps for histology. Verification of         |             
 |
| patient        identification for the specimen was done. Estimated          |             
 |
| blood loss was        minimal.       Suspect gastroparesis due to           |             
 |
| absence of peristalsis. Biopsies were taken        with a cold forceps      |             
 |
|  for Helicobacter pylori testing using CLOtest.        Verification of      |             
 |
|  patient identification for the specimen was done.        Estimated         |             
 |
| blood loss was minimal.       The duodenal bulb, first portion of the       |             
 |
| duodenum, second portion of the        duodenum, area of the papilla        |             
 |
| and third portion of the duodenum were        normal. Biopsies for          |             
 |
| histology were taken with a cold forceps for        evaluation of           |             
 |
| celiac disease. Verification of patient identification for        the       |             
 |
| specimen was done. Estimated blood loss was minimal.       The              |             
 |
| retroflexed view confirmed previous findings,Impression:       -            |             
 |
| Normal cricopharyngeus, upper third of esophagus, middle third of           |             
 |
 
|    esophagus and lower third of esophagus.       - Z-line irregular,        |             
 |
| 38 cm from the incisors. Biopsied.       - Gastroparesis. Biopsied.         |             
 |
|     - Normal duodenal bulb, first portion of the duodenum, second           |             
 |
| portion of        the duodenum, area of the papilla and third portion       |             
 |
| of the duodenum.        Biopsied.       - The retroflexed view              |             
 |
| confirmed previous findings,       - After a test biopsy for H. pylori      |             
 |
|  to see if there would be excessive        bleeding which there was         |             
 |
| not biopsies were obtained have the duodenum        mucosa in of the        |             
 |
| distal esophagus.Recommendation:       - Patient has a contact number       |             
 |
| available for emergencies. The signs and        symptoms of potential       |             
 |
| delayed complications were discussed with the        patient. Return        |             
 |
| to normal activities tomorrow. Written discharge        instructions        |             
 |
| were provided to the patient.       - Discharge patient to home             |             
 |
| (ambulatory).       - Resume previous diet today.       - Continue          |             
 |
| present medications.       - Await pathology results.       - Perform       |             
 |
| a colonoscopy today.       - Return to primary care physician as            |             
 |
| previously scheduled.       - Telephone GI clinic for pathology             |             
 |
| results in 1 week.       - Telephone GI clinic if symptomatic.Mahin TUBBS      |             
 |
|  MD Cali2017 11:18:53 AMThis report has been signed                  |             
 |
| electronically.Number of Addenda: 0Note Initiated On: 2017 10:20       |             
 |
| AMTotal Procedure Duration: 0 hours 8 minutes 53 seconds Scope In:          |             
 |
| 10:32:15 AMScope Out: 10:41:08 AM       [facility]         |             
 |
| [place], [address] [place] [address]        [address] [phone]          |             
 |
|       - Resume previous diet today.                                         |             
 |
|       - Continue present medications.                                       |             
 |
|       - Await pathology results.                                            |             
 |
|       - Perform a colonoscopy today.                                        |             
 |
|       - Return to primary care physician as previously scheduled.           |             
 |
|       - Telephone GI clinic for pathology results in 1 week.                |             
 |
|       - Telephone GI clinic if symptomatic.                                 |             
 |
 
|Mahin Leiva MD                                                           |             
 |
|2017 11:18:53 AM                                                        |             
 |
|This report has been signed electronically.                                  |             
 |
|Number of Addenda: 0                                                         |             
 |
|Note Initiated On: 2017 10:20 AM                                        |             
 |
|Total Procedure Duration: 0 hours 8 minutes 53 seconds                       |             
 |
|Scope In: 10:32:15 AM                                                        |             
 |
|Scope Out: 10:41:08 AM                                                       |             
 |
|       [facility], [facility] [address] [place], [place]  |             
 |
|       [id number] [phone]                                                    |             
 |
+-----------------------------------------------------------------------------+-------------
-+
 
 
 
+------------------+---------+--------------------+--------------+
| Performing       | Address | City/State/Zipcode | Phone Number |
| Organization     |         |                    |              |
+------------------+---------+--------------------+--------------+
|   [facility] |         |                    |              |
+------------------+---------+--------------------+--------------+
 ECG 12 lead (2017 10:16 AM PDT)
 
+-------------+--------------------------+-----------+------------+--------------+
| Component   | Value                    | Ref Range | Performed  | Pathologist  |
|             |                          |           | At         | Signature    |
+-------------+--------------------------+-----------+------------+--------------+
| VENTRICULAR | 67                       | BPM       | [facility]  |              |
|  RATE EKG   |                          |           |            |              |
+-------------+--------------------------+-----------+------------+--------------+
| ATRIAL RATE | 268                      | BPM       | [facility]  |              |
+-------------+--------------------------+-----------+------------+--------------+
| QRS         | 80                       | ms        | [facility]  |              |
| DURATION    |                          |           |            |              |
+-------------+--------------------------+-----------+------------+--------------+
| Q-T         | 408                      | ms        | [facility]  |              |
| INTERVAL    |                          |           |            |              |
+-------------+--------------------------+-----------+------------+--------------+
| Q-T         | 431                      | ms        | [facility]  |              |
| INTERVAL    |                          |           |            |              |
| (CORRECTED) |                          |           |            |              |
+-------------+--------------------------+-----------+------------+--------------+
| P WAVE AXIS | 97                       | degrees   | [facility]  |              |
+-------------+--------------------------+-----------+------------+--------------+
| QRS AXIS    | 61                       | degrees   | [facility]  |              |
+-------------+--------------------------+-----------+------------+--------------+
| T AXIS      | 56                       | degrees   | [facility]  |              |
+-------------+--------------------------+-----------+------------+--------------+
| INTERPRETAT | Atrial flutter with 4:1  |           | [facility]  |              |
| ION TEXT    | AV                       |           |            |              |
 
|             | conductionAnteroseptal   |           |            |              |
|             | infarct , age            |           |            |              |
|             | undeterminedAbnormal     |           |            |              |
|             | ECGNo previous ECGs      |           |            |              |
|             | availableConfirmed by    |           |            |              |
|             | STUART MCMILLAN MD ([id number])  |           |            |              |
|             | on 4/15/2017 7:30:50 AM  |           |            |              |
+-------------+--------------------------+-----------+------------+--------------+
 
 
 
+----------+
| Specimen |
+----------+
|          |
+----------+
 
 
 
+----------------------------------------------------------+--------------+
| Narrative                                                | Performed At |
+----------------------------------------------------------+--------------+
|   This result has an attachment that is not available.   |              |
+----------------------------------------------------------+--------------+
 
 
 
+--------------+---------+--------------------+--------------+
| Performing   | Address | City/State/Zipcode | Phone Number |
| Organization |         |                    |              |
+--------------+---------+--------------------+--------------+
|   [facility]  |         |                    |              |
+--------------+---------+--------------------+--------------+
 COLONOSCOPY (2017 10:10 AM PDT)
 
+----------+
| Specimen |
+----------+
|          |
+----------+
 
 
 
+-----------------------------------------------------------------------------+-------------
-+
| Narrative                                                                   | Performed At
 |
+-----------------------------------------------------------------------------+-------------
-+
|   This result has an attachment that is not available.                      |   [facility]      
 |
| GastroenterologyPatient Name: Susana Bhatia Date: 2017         | [place]   
 |
| 10:10 AMMRN: [id number] #: [id number] of Birth:                |             
 |
| 9Admit Type: AmbulatoryAge: 66Room: [facility] 01Gender: FemaleNote      |             
 |
|  Status: FinalizedAttending MD: Mahin Leiva MDProcedure:               |             
 |
|     ColonoscopyIndications:          High risk colon cancer                 |             
 
 |
| surveillance: Personal history of                             colonic       |             
 |
| polyps, Incidental - Change in bowel habits,                                |             
 |
|    Incidental - DiarrheaProviders:             Mahin Leiva MD,          |             
 |
| Sarah Beth Kowalski RN, Shiv Cross,      |             
 |
|  [facility], Jose Carias MD (Anesthesia Staff)Referring MD:              |             
 |
|   Tobin Alex MD (Referring MD)Medicines:             Sedation       |             
 |
| Required Anesthesia Staff AssistanceComplications:       No immediate       |             
 |
| complications. Estimated blood loss: Minimal.Procedure:                     |             
 |
| Pre-Anesthesia Assessment:       - Prior to the procedure, a History        |             
 |
| and Physical was performed, and        patient medications, allergies       |             
 |
| and sensitivities were reviewed. The        patient's tolerance of          |             
 |
| previous anesthesia was reviewed.       - Prior to the procedure, a         |             
 |
| History and Physical was performed, and        patient medications and      |             
 |
|  allergies were reviewed. The patient is        competent. The risks        |             
 |
| and benefits of the procedure and the sedation        options and           |             
 |
| risks were discussed with the patient. All questions were                   |             
 |
| answered and informed consent was obtained. Patient identification and      |             
 |
|         proposed procedure were verified. Mental Status Examination:        |             
 |
| normal.        Prophylactic Antibiotics: The patient requires               |             
 |
| prophylactic antibiotics        due to a prior history of prosthetic        |             
 |
| joint replacement. Prior        Anticoagulants: The patient has taken       |             
 |
| Xarelto (rivaroxaban), last dose        was day of procedure. ASA           |             
 |
| Grade Assessment: III - A patient with severe        systemic disease.      |             
 |
|  After reviewing the risks and benefits, the patient        was deemed      |             
 |
|  in satisfactory condition to undergo the procedure. The                    |             
 |
| anesthesia plan was to use monitored anesthesia care (MAC).                 |             
 |
| Immediately        prior to administration of medications, the patient      |             
 |
|  was re-assessed for        adequacy to receive sedatives. The heart        |             
 |
| rate, respiratory rate, oxygen        saturations, blood pressure,          |             
 |
| adequacy of pulmonary ventilation, and        response to care were         |             
 
 |
| monitored throughout the procedure. The physical        status of the       |             
 |
| patient was re-assessed after the procedure.       - After reviewing        |             
 |
| the risks and benefits, the patient was deemed in        satisfactory       |             
 |
| condition to undergo the procedure.       - Using IV propofol under         |             
 |
| the supervision of an anesthesiologist was        determined to be          |             
 |
| medically necessary for this procedure based on age 64        or            |             
 |
| older, severe comorbidity (greater than ASA Grade II), prolonged            |             
 |
|    procedure requiring deep sedation and patient's history of problems      |             
 |
|  with        anesthesia.       - Immediately prior to administration        |             
 |
| of medications, the patient was        re-assessed for adequacy to          |             
 |
| receive sedatives.       - The heart rate, respiratory rate, oxygen         |             
 |
| saturations, blood pressure,        adequacy of pulmonary ventilation,      |             
 |
|  and response to care were monitored        throughout the procedure.       |             
 |
|       - The physical status of the patient was re-assessed after the        |             
 |
| procedure.       After I obtained informed consent, the scope was           |             
 |
| passed under direct        vision. Throughout the procedure, the            |             
 |
| patient's blood pressure, pulse,        and oxygen saturations were         |             
 |
| monitored continuously. The Colonoscope was        introduced through       |             
 |
| the anus and advanced to the cecum, identified by the                       |             
 |
| appendiceal orifice, ileocecal valve and palpation. The colonoscopy         |             
 |
| was        technically difficult and complex due to significant             |             
 |
| looping and a        tortuous colon. Successful completion of the           |             
 |
| procedure was aided by        using manual pressure, straightening and      |             
 |
|  shortening the scope to obtain        bowel loop reduction and using       |             
 |
| scope torsion. The patient tolerated the        procedure well. The         |             
 |
| quality of the bowel preparation was excellent.Findings:       The          |             
 |
| perianal and digital rectal examinations were normal. Pertinent             |             
 |
|  negatives include normal sphincter tone and no palpable rectal             |             
 |
| lesions.       The sigmoid colon, descending colon and splenic flexure      |             
 |
|  were        significantly tortuous. Biopsies for histology were taken      |             
 
 |
|  with a cold        forceps from the ascending colon, descending colon      |             
 |
|  and sigmoid colon for        evaluation of microscopic colitis.            |             
 |
| Estimated blood loss was minimal.       The exam was otherwise without      |             
 |
|  abnormality.       The retroflexed view of the distal rectum and anal      |             
 |
|  verge was normal and        showed no anal or rectal                       |             
 |
| abnormalities.Impression:       - Tortuous colon. Biopsied.       -         |             
 |
| The examination was otherwise normal.       - The distal rectum and         |             
 |
| anal verge are normal on retroflexion view.Recommendation:       -          |             
 |
| Patient has a contact number available for emergencies. The signs and       |             
 |
|        symptoms of potential delayed complications were discussed with      |             
 |
|  the        patient. Return to normal activities tomorrow. Written          |             
 |
| discharge        instructions were provided to the patient.       -         |             
 |
| Discharge patient to home (ambulatory).       - Resume previous diet        |             
 |
| today.       - Continue present medications.       - Await pathology        |             
 |
| results.       - Return to primary care physician as previously             |             
 |
| scheduled.       - Telephone GI clinic for pathology results in 1           |             
 |
| week.       - Telephone GI clinic if symptomatic.Mahin Leiva            |             
 |
| MD2017 11:28:47 AMThis report has been signed                          |             
 |
| electronically.Number of Addenda: 0Note Initiated On: 2017 10:10       |             
 |
| AMScope Withdrawal Time: 0 hours 10 minutes 0 seconds Total Procedure       |             
 |
| Duration: 0 hours 23 minutes 48 seconds Scope In: 10:43:53 AMScope          |             
 |
| Out: 11:07:41 AM       [facility], [address]            |             
 |
| [place]        [address] [phone]                        |             
 |
|       - Await pathology results.                                            |             
 |
|       - Return to primary care physician as previously scheduled.           |             
 |
|       - Telephone GI clinic for pathology results in 1 week.                |             
 |
|       - Telephone GI clinic if symptomatic.                                 |             
 |
|Mahin Leiva MD                                                           |             
 |
|2017 11:28:47 AM                                                        |             
 |
|This report has been signed electronically.                                  |             
 
 |
|Number of Addenda: 0                                                         |             
 |
|Note Initiated On: 2017 10:10 AM                                        |             
 |
|Scope Withdrawal Time: 0 hours 10 minutes 0 seconds                          |             
 |
|Total Procedure Duration: 0 hours 23 minutes 48 seconds                      |             
 |
|Scope In: 10:43:53 AM                                                        |             
 |
|Scope Out: 11:07:41 AM                                                       |             
 |
|       [facility], [address] [place], [place]  |             
 |
|       [id number] [phone]                                                    |             
 |
+-----------------------------------------------------------------------------+-------------
-+
 
 
 
+------------------+---------+--------------------+--------------+
| Performing       | Address | City/State/Zipcode | Phone Number |
| Organization     |         |                    |              |
+------------------+---------+--------------------+--------------+
|   [facility] |         |                    |              |
+------------------+---------+--------------------+--------------+
 Surgical Pathology Exam (2017 12:00 AM PDT)
 
+----------+
| Specimen |
+----------+
|          |
+----------+
 
 
 
+------------------------------------------------------------------------+-----------------+
| Narrative                                                              | Performed At    |
+------------------------------------------------------------------------+-----------------+
|   SPECIMEN(S): A DUODENAL BIOPSY  SPECIMEN(S): B ESOPHAGEAL BIOPSY     |   [facility]  |
| SPECIMEN(S): C RIGHT COLON BIOPSY  SPECIMEN(S): D LEFT COLON BIOPSY    | [facility]          |
|   SPECIMEN SOURCE:  A. DUODENAL BIOPSY  B. ESOPHAGEAL BIOPSY  C. RIGHT |                 |
|  COLON BIOPSY  D. LEFT COLON BIOPSY     CLINICAL HISTORY:  K21.9       |                 |
| (gastroesophageal reflux disease without esophagitis), R19.7           |                 |
| (diarrhea, unspecified), I48.91 (Unspecified atrial fibrillation),     |                 |
| Z86.010 (personal history of colonic polyps) R54 (Age-related          |                 |
| physical debility)     MICROSCOPIC DESCRIPTION:  Histologic sections   |                 |
| of all submitted blocks are examined by light microscopy. These        |                 |
| findings, together with the gross examination, support the pathologic  |                 |
| diagnosis.     FINAL PATHOLOGIC DIAGNOSIS:  A. Duodenal biopsy:  -     |                 |
|   Fragments of duodenal mucosa with preserved villous architecture.  - |                 |
|    No evidence of significantly increased intraepithelial lymphocytes. |                 |
|      B. Esophageal biopsy:  -   Fragments of gastric mucosa with foci  |                 |
| of mild chronic inflammation and reactive epithelial changes.  -   No  |                 |
| squamous mucosa identified as sampled.  -   Negative for intestinal    |                 |
| metaplasia as sampled.     C. Colon, right, biopsy:  -   Fragments of  |                 |
| colonic mucosa with no significant diagnostic abnormality.     D.      |                 |
| Colon, left, biopsy:  -   Fragments of colonic mucosa with no          |                 |
 
| significant diagnostic abnormality.     CLR:[facility]:C2NR     GROSS         |                 |
| DESCRIPTION:  Received in four parts.     A. Received in formalin      |                 |
| labeled "Susana Colbert" and "duodenal biopsy" on the requisition are    |                 |
| seven pink-tan tissue fragments measuring from 0.2-0.5 cm, submitted,  |                 |
| all into (A1).     B. Received in formalin labeled "Susana Colbert" and  |                 |
| "esophageal biopsy" on the requisition are six pink-tan tissue         |                 |
| fragment measuring from <0.1-0.55 cm, submitted, all in (B1).     C.   |                 |
| Received in formalin labeled "Susana Colbert" and "right colon biopsy"   |                 |
| on the requisition are six gray-tan tissue fragments measuring from    |                 |
| 0.3-0.45 cm, submitted, all into (C1).     D. Received in formalin     |                 |
| labeled "Susana Colbert" and "left colon biopsy" on the requisition are  |                 |
| eight pink-tan tissue fragments measuring from 0.1-0.3 cm, submitted,  |                 |
| all into (D1).  ka:JEN:navin     PERFORMING LABORATORY:  Tissue          |                 |
| processing and slide preparation were performed by [employer], |                 |
|  [address] [place] [address] [address], [place] [address] (Medical Director:  |                 |
| Stuart Sosa M.D. [facility]#: [id number]).  Professional interpretation  |                 |
| was performed by [employer], [facility]      |                 |
| [place], [address] [place] [address], [place] [address] (Medical         |                 |
| Director:   Marcos López [facility]#:  [id number]).                 |                 |
| Diagnostician:   Marcos López MD  Pathologist  Electronically |                 |
|  Signed 2017                                                     |                 |
+------------------------------------------------------------------------+-----------------+
 
 
 
+-----------------+---------+--------------------+--------------+
| Performing      | Address | City/State/Zipcode | Phone Number |
| Organization    |         |                    |              |
+-----------------+---------+--------------------+--------------+
|   [facility]  |         |                    |              |
| [facility]          |         |                    |              |
+-----------------+---------+--------------------+--------------+
 documented in this encounter
 
 Visit Diagnoses
 Not on filedocumented in this encounter
 
 Administered Medications
 
 
+-----------------------------------+---------+----------+------+-------+------+
| Medication Order                  | MAR     | Action   | Dose | Rate  | Site |
|                                   | Action  | Date     |      |       |      |
+-----------------------------------+---------+----------+------+-------+------+
|   ampicillin 2 g in sodium        | New Bag | 20 | 2 g  | 200   |      |
| chloride 0.9% 100 mL IVPB  2 g,   |         | 17  9:45 |      | mL/hr |      |
| Intravenous, Administer over 30   |         |  AM PDT  |      |       |      |
| Minutes, Prior to Incision,       |         |          |      |       |      |
| Starting Fri 17 at 0937, For |         |          |      |       |      |
|  1 dose, TO BE GIVEN PRIOR TO     |         |          |      |       |      |
| PROCEDURE Activate system and mix |         |          |      |       |      |
|  before use., Pre-op,             |         |          |      |       |      |
| Indications: total knee           |         |          |      |       |      |
| replacement                       |         |          |      |       |      |
+-----------------------------------+---------+----------+------+-------+------+
 
 
 
+---+---+
|   |   |
 
+---+---+
 
 
 
+-----------------------------------+---------+----------+-------+-------+---+
|   gentamicin 60 mg in sodium      | New Bag | 20 | 60 mg | 103   |   |
| chloride 0.9% 50 mL IVPB  60 mg,  |         | 17  9:58 |       | mL/hr |   |
| Intravenous, Administer over 30   |         |  AM PDT  |       |       |   |
| Minutes, Prior to Incision,       |         |          |       |       |   |
| Starting Fri 17 at 0937, For |         |          |       |       |   |
|  1 dose, TO BE GIVEN PRIOR TO     |         |          |       |       |   |
| PROCEDURE, Pre-op, Indications:   |         |          |       |       |   |
| total knee replacement            |         |          |       |       |   |
+-----------------------------------+---------+----------+-------+-------+---+
 
 
 
+---------+----------+---+---+---+
| New Bag | 20 |   |   |   |
|         | 17  9:57 |   |   |   |
|         |  AM PDT  |   |   |   |
+---------+----------+---+---+---+
 
 
 
+---+---+
|   |   |
+---+---+
 
 
 
+-----------------------------------+---------+----------+---+---+---+
|   lactated ringers (LR) infusion  | New Bag | 20 |   |   |   |
|  at  mL/hr, Intravenous,    |         | 17  9:45 |   |   |   |
| CONTINUOUS, Starting Fri 17  |         |  AM PDT  |   |   |   |
| at 1000, TKO., Pre-op             |         |          |   |   |   |
+-----------------------------------+---------+----------+---+---+---+
 
 
 
+---------+----------+---+----------+---+
| New Bag | 20 |   | 50 mL/hr |   |
|         | 17  9:45 |   |          |   |
|         |  AM PDT  |   |          |   |
+---------+----------+---+----------+---+
 
 
 
+---+---+
|   |   |
+---+---+
 documented in this encounter

## 2020-09-21 NOTE — XMS
Encounter Summary
  Created on: 2020
 
 SayraSusana
 External Reference #: [id number]
 : 48
 Sex: Female
 
 Demographics
 
 
+-----------------------+---------------------------+
| Address               | [address]            |
|                       | [place]  [address] |
+-----------------------+---------------------------+
| Home Phone            | [phone]           |
+-----------------------+---------------------------+
| Preferred Language    | Unknown                   |
+-----------------------+---------------------------+
| Marital Status        |                    |
+-----------------------+---------------------------+
| Catholic Affiliation | 1073                      |
+-----------------------+---------------------------+
| Race                  | White                     |
+-----------------------+---------------------------+
| Ethnic Group          | Not  or     |
+-----------------------+---------------------------+
 
 
 Author
 
 
+--------------+--------------------------------------------+
| Author       | [facility]  |
|              | [facility] [place]                                |
+--------------+--------------------------------------------+
| Organization | [facility]  |
|              | [facility] [place]                                |
+--------------+--------------------------------------------+
| Address      | Unknown                                    |
+--------------+--------------------------------------------+
| Phone        | Unavailable                                |
+--------------+--------------------------------------------+
 
 
 
 Support
 
 
+-------------+--------------+-------------------+-----------------+
| Name        | Relationship | Address           | Phone           |
+-------------+--------------+-------------------+-----------------+
| Eduar Colbert | ECON         | [address]       | [phone] |
|             |              | [place]   |                 |
|             |              | [id number]             |                 |
+-------------+--------------+-------------------+-----------------+
 
 
 
 
 Care Team Providers
 
 
+-------------------------+------+-----------------+
| Care Team Member Name   | Role | Phone           |
+-------------------------+------+-----------------+
| Tobin Alex MD | PCP  | [phone] |
+-------------------------+------+-----------------+
 
 
 
 Reason for Visit
 
 
+--------------+--------+----------+
| Reason       | Onset  | Comments |
|              | Date   |          |
+--------------+--------+----------+
| Imaging Only | / |          |
|              |    |          |
+--------------+--------+----------+
 
 
 
 Encounter Details
 
 
+--------+-----------+----------------------+----------------------+--------------+
| Date   | Type      | Department           | Care Team            | Description  |
+--------+-----------+----------------------+----------------------+--------------+
| / | Telephone |   [facility]          |   Juan Carlos Howard,   | Imaging Only |
| 2018   |           | ORTHOPEDIC SURGERY   | MD  [address]     |              |
|        |           | [address]  [place] | [facility]      |              |
|        |           |  [place]           | [id number] [phone]  |              |
|        |           | [address]           |  [phone] (Fax)  |              |
|        |           | [phone]         |                      |              |
+--------+-----------+----------------------+----------------------+--------------+
 
 
 
 Social History
 
 
+---------------+------------+-----------+--------+------------------+
| Tobacco Use   | Types      | Packs/Day | Years  | Date             |
|               |            |           | Used   |                  |
+---------------+------------+-----------+--------+------------------+
| Former Smoker | Cigarettes | 1.5       | 5      | Quit: 1979 |
+---------------+------------+-----------+--------+------------------+
 
 
 
+---------------------+---+---+---+
| Smokeless Tobacco:  |   |   |   |
| Never Used          |   |   |   |
+---------------------+---+---+---+
 
 
 
 
+-------------+----------------------+---------+------------------+
| Alcohol Use | Drinks/Week          | oz/Week | Comments         |
+-------------+----------------------+---------+------------------+
| Yes         |   1 Shots of liquor  | 1.0     | "social drinker" |
|             |  0 Standard drinks   |         |                  |
|             | or equivalent        |         |                  |
+-------------+----------------------+---------+------------------+
 
 
 
+------------------+---------------+
| Sex Assigned at  | Date Recorded |
| Birth            |               |
+------------------+---------------+
| Not on file      |               |
+------------------+---------------+
 documented as of this encounter
 
 Miscellaneous Notes
 Telephone Encounter - Evie Boswell Cert MA - 2018 11:51 AM PDTNancy was n
otified per  that unfortunately it was our radiologist who saw the lesion and was 
requesting further evaluation to figure out what is going on with her leg. However, If she h
as it done in [place] our radiologist is not going to read imaging done at another facilit
y. She would then be trusting that the radiologist's findings there. She was told it is up t
o her on how she would like to proceed forward.
 She would like to continue to proceed forward here. She was transferred over to Imaging to 
schedule her MRI. Electronically signed by Dre Nichole MA at 2018 11:
53 AM PDTTelephone Encounter - Marian Enriquez - 2018 11:30 AM PDTPatient called [facility] [place]. Please advise and call [phone].Electronically
 signed by Marian Enriquez at 2018 11:31 AM PDTdocumented in this encounter
 
 Plan of Treatment
 
 
+--------+---------+-----------+----------------------+-------------+
| Date   | Type    | Specialty | Care Team            | Description |
+--------+---------+-----------+----------------------+-------------+
| 10/12/ | Office  | Neurology |   Jamin Brooks MD  [address] |             |
| 2020   | Visit   |           |  [address] [place]      |             |
|        |         |           | [address]  [place],  |             |
|        |         |           | [place] [address]             |             |
|        |         |           | [phone]         |             |
|        |         |           | [phone] (Fax)   |             |
+--------+---------+-----------+----------------------+-------------+
 documented as of this encounter
 
 Visit Diagnoses
 Not on filedocumented in this encounter

## 2020-09-21 NOTE — XMS
Encounter Summary
  Created on: 2020
 
 Susana Colbert
 External Reference #: [id number]
 : 48
 Sex: Female
 
 Demographics
 
 
+-----------------------+------------------------+
| Address               | [address]         |
|                       | [place]  [address]   |
+-----------------------+------------------------+
| Home Phone            | [phone]        |
+-----------------------+------------------------+
| Preferred Language    | Unknown                |
+-----------------------+------------------------+
| Marital Status        |                 |
+-----------------------+------------------------+
| Latter day Affiliation | Unknown                |
+-----------------------+------------------------+
| Race                  | White                  |
+-----------------------+------------------------+
| Ethnic Group          | Not  or  |
+-----------------------+------------------------+
 
 
 Author
 
 
+--------------+------------------------------+
| Author       | [facility] |
+--------------+------------------------------+
| Organization | [facility] |
+--------------+------------------------------+
| Address      | Unknown                      |
+--------------+------------------------------+
| Phone        | Unavailable                  |
+--------------+------------------------------+
 
 
 
 Support
 
 
+--------------+--------------+---------+-----------------+
| Name         | Relationship | Address | Phone           |
+--------------+--------------+---------+-----------------+
| Raj Mckeon | ALINA         | Unknown | [phone] |
+--------------+--------------+---------+-----------------+
 
 
 
 Care Team Providers
 
 
 
+-------------------------+------+-----------------+
| Care Team Member Name   | Role | Phone           |
+-------------------------+------+-----------------+
| Tobin Alex MD | PCP  | [phone] |
+-------------------------+------+-----------------+
 
 
 
 Reason for Visit
 
 
+--------------+------------+
| Reason       | Comments   |
+--------------+------------+
| New patient  | vulvodynia |
| consultation |            |
+--------------+------------+
 Consultation (Routine)
 
+--------+--------+---------------+--------------+--------------+---------------+
| Status | Reason | Specialty     | Diagnoses /  | Referred By  | Referred To   |
|        |        |               | Procedures   | Contact      | Contact       |
+--------+--------+---------------+--------------+--------------+---------------+
| Closed |        | Obstetrics &  |   Procedures |   Edgar,      |   Fátima    |
|        |        | Gynecology    |   NP CONSULT | Ahsan PETERSEN,  | MD Veronica |
|        |        |               |              | MD  [address] |   [address] |
|        |        |               |              |  [address] |  [facility] |
|        |        |               |              |   [address]    |  Rd           |
|        |        |               |              | [place],   | [place], [place]  |
|        |        |               |              | [place] [address]     | [address]    |
|        |        |               |              | Phone:       | Phone:        |
|        |        |               |              | [phone] | [phone]  |
|        |        |               |              |   Fax:       |  Fax:         |
|        |        |               |              | [phone] | [phone]  |
+--------+--------+---------------+--------------+--------------+---------------+
 
 
 
 
 Encounter Details
 
 
+--------+---------+----------------------+----------------------+----------------------+
| Date   | Type    | Department           | Care Team            | Description          |
+--------+---------+----------------------+----------------------+----------------------+
| / | Office  |   [facility] |   Fátima,           | Vulvodynia (Primary  |
|    | Visit   |  [facility]    | MD Veronica  2021  | Dx); Vulvar          |
|        |         | Pavilion  [address]     | [facility]  | vestibulitis;        |
|        |         | [address]    | [place]  [place], [place]     | Hypoestrogenism      |
|        |         | Pavilion, 7th floor  | [address]           |                      |
|        |         |  [place], [place]        | [phone]         |                      |
|        |         | [address]           | [phone] (Fax)   |                      |
|        |         | [phone]         |                      |                      |
+--------+---------+----------------------+----------------------+----------------------+
 
 
 
 Social History
 
 
 
+---------------+------------+-----------+--------+------+
| Tobacco Use   | Types      | Packs/Day | Years  | Date |
|               |            |           | Used   |      |
+---------------+------------+-----------+--------+------+
| Former Smoker | Cigarettes | 2         | 6      |      |
+---------------+------------+-----------+--------+------+
 
 
 
+-------------+-------------+---------+----------+
| Alcohol Use | Drinks/Week | oz/Week | Comments |
+-------------+-------------+---------+----------+
| Yes         |             |         |          |
+-------------+-------------+---------+----------+
 
 
 
+------------------+---------------+
| Sex Assigned at  | Date Recorded |
| Birth            |               |
+------------------+---------------+
| Not on file      |               |
+------------------+---------------+
 documented as of this encounter
 
 Last Filed Vital Signs
 
 
+-------------------+----------------------+----------------------+----------+
| Vital Sign        | Reading              | Time Taken           | Comments |
+-------------------+----------------------+----------------------+----------+
| Blood Pressure    | -                    | -                    |          |
+-------------------+----------------------+----------------------+----------+
| Pulse             | -                    | -                    |          |
+-------------------+----------------------+----------------------+----------+
| Temperature       | -                    | -                    |          |
+-------------------+----------------------+----------------------+----------+
| Respiratory Rate  | -                    | -                    |          |
+-------------------+----------------------+----------------------+----------+
| Oxygen Saturation | -                    | -                    |          |
+-------------------+----------------------+----------------------+----------+
| Inhaled Oxygen    | -                    | -                    |          |
| Concentration     |                      |                      |          |
+-------------------+----------------------+----------------------+----------+
| Weight            | 61.5 kg (135 lb 9.6  | 2010  2:15 PM  |          |
|                   | oz)                  | PDT                  |          |
+-------------------+----------------------+----------------------+----------+
| Height            | 170.2 cm (5' 7")     | 2010  2:15 PM  |          |
|                   |                      | PDT                  |          |
+-------------------+----------------------+----------------------+----------+
| Body Mass Index   | 21.24                | 2010  2:15 PM  |          |
|                   |                      | PDT                  |          |
+-------------------+----------------------+----------------------+----------+
 documented in this encounter
 
 Patient Instructions
 Patient Instructions Clara Her - 2010  3:48 PM PDT1. Use the lidocaine nightly
.  First apply 2% gel with your finger to the vestibule.  Then apply some of the 5% lidocain
e to a cotton ball and place that at the opening of the vagina. Leave the cotton ball there 
overnight.  Do this every night.
 
 
 If you want to try lidocaine with sexual activity, you may.  Use it at the opening of the v
agina for at least 10-20 minutes before you attempt vaginal intercourse.
 
 Additionally, you may use the 5% lidocaine ointment several times a day to help reduce your
 pain.  You can do that additionally to the cotton ball treatment.
 
 2. Consider Shalini Eduard for PT if you want to engage in that therapy
 
 3. Continue your use of Vagifem 2x/week.  Continue the use of topical premarin once a week 
at your vaginal opening (vestibule).
 
 4. Consider a new lubricant, like mineral oil or bland oils (canola, mineral)
 
 Physical Therapy and Biofeedback in the Treatment of Vaginismus and Vulvar Pain
 
 Physical Therapy
 Physical therapy can be an integral part of treatment for vaginismus and vulvar pain due to
 problems with pelvic floor muscles. An evaluation with a physical therapist includes a stru
ctural and musculoskeletal examination of your pelvis, sacrum, hips, and spine. It also incl
udes a manual examination of the pelvic floor muscles. Biofeedback may also be a part of the
 assessment.
 
 The structural exam will include assessing the position and movement quality of the spine a
nd pelvic joints to determine how they might be contributing to or perpetuating your pain by
 causing the pelvic floor muscles to spasm. The movements are all gentle and within your babatunde
n tolerance. The musculoskeletal exam will also include palpation of the muscles of your anne-marie
k, hip and abdomen to identify trigger points that can refer pain to the pelvic floor.
 
 The manual exam of the pelvic floor will include observation and palpation of the outside o
f your pelvic floor and an internal muscle exam. The internal exam is done gently and slowly
 with one finger to determine if the deeper muscles of the pelvic floor are part of the pain
 mechanism. The strength and endurance of your muscles will also be assessed by having you s
queeze around the therapist's finger.
 
 Biofeedback
 Biofeedback is a method of improving awareness of bodily functions which can eventually tali
d to better control. These bodily functions can be temperature, blood pressure, heart rate, 
or muscle tone. In pelvic pain and vaginismus, it is helpful to evaluate the muscle tone of 
the pelvic floor.
 
 Spasm of the pelvic floor muscles can lead to pelvic pain. By using sensitive equipment and
 a gentle exam, an evaluation is completed. An internal vaginal sensor is placed along with 
an external surface probe. These sensors detect the amount of tone in the pelvic floor muscl
es, and then convert this information to both visual and auditory signals on a computer scre
en. This allows you to see and hear how the muscles work and rest.
 
 When biofeedback is combined with specific exercises and activities, you can learn to relax
 tense muscles and strengthen weak ones.
 
 Program in [facility]
 [facility]
 [facility] [place]
 Last updated 
 [email or web address]
 
 Electronically signed by Clara Her at 2010  3:49 PM PDT
 documented in this encounter
 
 Progress Notes
 
 Clara Her - 2010  2:27 PM PDTElectronically signed by Veronica Shine MD at
 2010  2:36 PM Veronica Uriarte MD - 2010  5:48 AM PDTFormatting of this n
ote might be different from the original.
 
 
 VULVAR INITIAL EXAM
 
  Patient Self Reported Intake/Questionnaire:
 
 1. My vulvar condition began when I was 58 years old.  This was 1 years ago.
 
 2. Based on a scale from 0 (no symptoms) to 10 (worst symptoms) rate your general level of 
vulvar discomfort 
     VULVAR PAIN TODAY:  0
      
     PAIN DURING/AFTER SEXUAL TOUCH: difficult but do able with lubrication
 
 3. In addition to the above, which describes your problem?   
      Itching?   
            Itch without discharge     yes
            Itching with discharge  no
      
      Pain/burning/rawness?  burning
            Constantly?   Off and on                 
            Only with specific touch?  no
  
      Chronic abnormal Vaginal discharge?  no
  
      Skin splits?   
            Spontaneous splits?  no
            Splits just with intercourse?  no
 
  Hx of STD? no
 
      Have you noted any skin changes? No, dose have a red ring that is irritated
 
      It feels better when a cream or salve is applied to my vulva yes
 
      I wear cotton underwear? yes
 
      I use mild soaps and detergents? no
 
      Dietary factors affect my pain? no
 
      I get bladder pain, urgency, frequency? no
 
      I cannot use tampons due to pain? N/A
 
      Speculums have always been painful? no
 
      My sister, mother, or daughter have my symptoms too? no
 
       I avoid intimate relationships due to pain? no
 
 4.  I became sexually active at age 17.
      Number of lifetime sexual partners Less than 5
      Are you currently having sexual intercourse? Yes
      My relationship with my partner has become strained ? no
      I/we use artificial lubricants? Some
      I experience pain at the vaginal opening during sex? Some
 
      My sexual desire has diminished due to my symptoms? Yes
      My symptoms affect my ability to be orgasmic? No
      I have been sexually abused? No
      My pain began during my post partum period No
 
 I have undergone a biopsy of the vulva No
 
 Medication Therapies I have tried:Creams,  Premarin cream, Vagifem It did help and Oral Med
ications Gabapentin and Amitripytline was not tried due to the contraindication for Afib
 
 Other therapies I have tried: none
      I
 Approximately 10 minutes were spent reviewing and summarizing old records documenting the e
valuation and therapeutic trials for this patient's vulvar condition. 
  
 Susana Colbert is a 59 y.o. female  T0 P0 SAB0 TAB0 L0 Mult0 Ect0  who presents today 
with vulvar pain. She is referred by Sury Hernández MD in [place].
 
 1. Vulvar pain:  Began about 1 year ago after her daughter's death on 2009; Though
t at that time that she detected an odor but by  the end of  tried to have intercour
se, and it was very painful.
 Saw DR. Hernández and was dx with vulvodynia-vestibulitis;  Was given many behaviors to change i
ncluding bathing, soaps, fragrances, detergents. Started on Premarin cream and Vagifem.  Use
 the premarin 2x/day topically and VAgifem 2x/week. It made the pain bearable.  The worse ch
aracteristic of her pain is the intercourse.  Does have some element of burning otherwise, a
lbeit more minor compared to the intercourse pain.
 
 Ended up in afib on  the day after her daughter's death. Has been on afib meds since
 that time including a full ASA 325mg. (Just recently changed to baby ASA due to bleeding c 
omplication noted below).
 
 Now, has some element of burning every day. Uncomfortable with pants too tight. Rides a bik
e. Hasn't worn pantyhose since she feels like it may ppt pain.  
 
 About 3 weeks ago, found a lump in her vulva about 1-2cm big.  Saw DR. Hernández and was dx with
 a sebaceous cyst. Excision was performed that ended in complications of  Hematoma, pain, an
emia and prolonged healing. She thinks her daily ASA for the afib had some part;  Took about
 3 weeks to heal on R side and just now starting to feeling it's nearly healed. Due to this 
incident, was changed from full ASA to a baby ASA.  
 
 Now, she experiences pain as a burning sensation. Has tried topical E products along with o
ral neurmodulators including gabapentin.  FEels like she is better  And not sure if it's the
 E or gabapentin.  Is on 300mg TID, started that about 6 months. Does make her fuzzy a bit s
o she does not use when she works 2x/week. Only ends up using 300mg BID on those days.
 
 Has tried to have intercourse since on gabapentin and it's better.  For lubricant, [place] [employer]
tive. 
 
 AHSAN HERNÁNDEZ
 [address]
 [place] [address] [phone]
 
 No past medical history on file.
 No past surgical history on file.
 No family history on file.
 
 Menopause:  Age at last menses was .  No postmenopausal bleeding.
 Sexual Activity:  Patient is sexually active with a male partner.  She has not had a new se
xual partner in the last year. 
 
 Pap Smear History:  Date of last pap smear was   Negative history of abnormal pap smear
s. . 
 Sexually Transmitted Infections:  No history.
 Sexual/Physical Abuse:  No history of abuse.
 Obstetric History:  Number of total pregnancies: 3.
 
 No current outpatient prescriptions on file. 
 
 ALLERGIES: Review of patient's allergies indicates not on file.
 
 SOCIAL HISTORY AND HABITS:
 Patient is  and lives with partner.  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
n. They live in [place] having moved there from [place] about 30 years ago. 
 
 REVIEW OF SYSTEMS: 
 
 Abdominal or pelvic pain: no
 Constipation / diarrhea / blood in stool:  no
 Heartburn / trouble swallowing: no 
 Urine leakage: no
 Vaginal / vulvar itching, irritation, discharge: yes see HPI
 Breast lumps / nipple discharge: no
 Chest pain: no
 Shortness of breath: no
 Pain with sexual intercourse: yes see HPI
 Visual / hearing problems: no
 Weight loss, fever, chills sweats: no
 Headaches- migraine or tension: no
 Numbness / tingling / weakness of extremities: no
 Joint / muscle pain: no
 Depression, anxiety, irritability, trouble sleeping: no
 Hot flashes / vaginal dryness: no
 Other concerns: yes see HPI
 
 GENERAL PHYSICAL EXAM:
 General:  This is a well appearing, white female in no apparent distress.
 Skin:  Warm, dry and no rashes or lesions.
 Lungs:  Breathing comfortably
 Abdomen:  Soft. Non-tender. No mass. No hepatosplenomegaly. 
 Musculoskeletal:  Normal. 
 Extremities:  No edema.  No inguinal lymphadenopathy. 
 Neurological:  Normal
 
 VULVAR PHYSICAL EXAM
 
 Labis Majora: normal.
 Labia Minora: inner upper R labia minora is a 7mm nearly healed incision, small indentation
, mild firmness and mild tenderness to touch, no ecchymoses 
 Clitoral Dejesus: normal and retractable.
 Clitoris: normal.
 Vestibule:  Mild erythema only at 4---8 o'clock and Qtip test is positive 2+ with touch, sa
me areas.
 Qtip test with 4% aqueous lidocaine reverse allodynia 75%.
 Urethra: normal. 
 Hymen: small.
 Posterior Fourchette: normal.
 Perineum: normal.
 Perianal: normal.
 Vagina: well estrogenized and accomodates 2 fingers well.
 Levator Plate: hypertonic and mildly tender.
 
 
 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 She has some elemen
ts of MIXED vulvodynia since she has an element of daily burning that also disrupts her day 
to day living.  
 
 I spent nearly 80 minutes with Susana and Eduar today, nearly 100% spent in face to face coun
seling.
 
 1. Vestibulodynia with a MIXED component:  Long discussion regarding the diagnosis of vesti
bulodynia transpired with the patient. The current research supporting different treatment o
ptions was also reviewed.  This included topical 5% lidocaine ointment, oral neuromodulators
, pelvic floor rehabilitation, YAG laser surgery and vestibulectomy.
 
 Plan:  I would avoid surgery in Susana at this point after hearing her most recent complicat
ion from a sebaceous cyst excision.  She is very open to trying local treatment. Since she i
s already on oral gabapentin, would leave her as is.  WE did discuss that due to her MIXED c
omponent, she may do better staying the oral gabapentin than titration off.
 
 After discussion of the topical products that are available, she has decided to try the 5% 
lidocaine protocol and arely 3% local for prn use.  I advised also a lubricant change.
 
 Plan:  Zolnouns' protocol and 2% lidocaine for local use
 
 2. Myalgia:  Discussion ocurred with the patient regarding the nature of vaginismus.  I exp
lained the compensatory mechanism learned from the levator muscles after chronic vulvovagina
l pain.  It was also reviewed that diligent pelvic floor rehabilitation with an experienced 
physical therapist and home exercise program often results in diminution of pain and increas
ed sexual functioning.
 
 Plan: discussed, although less so due to time constraints.  Susana would like to try this on
 her own first.  The instructions for dilator use was reviewed with them.  In addition, Shalini
 Eduard is in the [place] area and may be a good resource for her.
 
 3. Hypoestrogenism:  Agree with the support of Vagifem 2x/week and topical E to the introti
us. Re-instructed WHERE to put the topical E since it seems that Susana was only getting it t
o the inner labia minora and not in the vestibule
 
 Plan:
 
 Patient will return to clinic in 4-6 months .
 
 Vulvar education worksheets were distributed to the patient Vulvar Pain Disorders, Physical
 Therapy, Vaginismus, Vaginal Dilators and AVS.
 
 Letter was sent to referring doctor: yes.
 
 I spent 80 minutes with this patient, in which greater than 50% was spent counseling this p
atient regarding the evaluation, management and therapeutic options for her vulvar condition
.
 
 After seeing this delightful woman in consultation, it is my recommendation that she contin
ue care in the [facility] for at least 3 more visits and then return to her p
rimary GYN. 
 
 Electronically signed by Veronica Shine MD at 2010  9:16 AM PDTdocumented in this
 encounter
 
 Miscellaneous Notes
 Scan - Unknown - 2010 12:00 AM PDT 
   documented in this encounter
 
 
 Plan of Treatment
 Not on filedocumented as of this encounter
 
 Visit Diagnoses
 
 
+-------------------------------------------------+
| Diagnosis                                       |
+-------------------------------------------------+
|   Vulvodynia - Primary  Vulvodynia, unspecified |
+-------------------------------------------------+
|   Vulvar vestibulitis                           |
+-------------------------------------------------+
|   Hypoestrogenism  Other ovarian failure        |
+-------------------------------------------------+
 documented in this encounter

## 2020-09-21 NOTE — XMS
Encounter Summary
  Created on: 2020
 
 SayraSusana
 External Reference #: [id number]
 : 48
 Sex: Female
 
 Demographics
 
 
+-----------------------+---------------------------+
| Address               | [address]            |
|                       | [place]  [address] |
+-----------------------+---------------------------+
| Home Phone            | [phone]           |
+-----------------------+---------------------------+
| Preferred Language    | Unknown                   |
+-----------------------+---------------------------+
| Marital Status        |                    |
+-----------------------+---------------------------+
| Nondenominational Affiliation | [id number]                      |
+-----------------------+---------------------------+
| Race                  | White                     |
+-----------------------+---------------------------+
| Ethnic Group          | Not  or     |
+-----------------------+---------------------------+
 
 
 Author
 
 
+--------------+--------------------------------------------+
| Author       | [facility]  |
|              | [facility] [place]                                |
+--------------+--------------------------------------------+
| Organization | [facility]  |
|              | [facility] [place]                                |
+--------------+--------------------------------------------+
| Address      | Unknown                                    |
+--------------+--------------------------------------------+
| Phone        | Unavailable                                |
+--------------+--------------------------------------------+
 
 
 
 Support
 
 
+-------------+--------------+-------------------+-----------------+
| Name        | Relationship | Address           | Phone           |
+-------------+--------------+-------------------+-----------------+
| Eduar Colbert | ECON         | [address]       | [phone] |
|             |              | [place]   |                 |
|             |              | [address]             |                 |
+-------------+--------------+-------------------+-----------------+
 
 
 
 
 Care Team Providers
 
 
+-------------------------+------+-----------------+
| Care Team Member Name   | Role | Phone           |
+-------------------------+------+-----------------+
| Tobin Alex MD | PCP  | [phone] |
+-------------------------+------+-----------------+
 
 
 
 Encounter Details
 
 
+--------+-----------+----------------------+----------------------+----------------------+
| Date   | Type      | Department           | Care Team            | Description          |
+--------+-----------+----------------------+----------------------+----------------------+
| / | Hospital  |   [facility] |   Mahin Leiva MD | Gastroesophageal     |
| 2017   | Encounter |  [facility] |   [address], [place]  | reflux disease       |
|        |           |   [address]       | [address]  [place], [place] | without esophagitis  |
|        |           | [place], [place]      |  [address]  [phone] | (Primary Dx);        |
|        |           | [address]           |   [phone] (Fax) | History of colon     |
|        |           | [phone]         |                      | polyps; Change in    |
|        |           |                      |                      | bowel habit          |
+--------+-----------+----------------------+----------------------+----------------------+
 
 
 
 Social History
 
 
+---------------+------------+-----------+--------+------------------+
| Tobacco Use   | Types      | Packs/Day | Years  | Date             |
|               |            |           | Used   |                  |
+---------------+------------+-----------+--------+------------------+
| Former Smoker | Cigarettes | 1.5       | 5      | Quit: 1979 |
+---------------+------------+-----------+--------+------------------+
 
 
 
+---------------------+---+---+---+
| Smokeless Tobacco:  |   |   |   |
| Never Used          |   |   |   |
+---------------------+---+---+---+
 
 
 
+-------------+----------------------+---------+------------------+
| Alcohol Use | Drinks/Week          | oz/Week | Comments         |
+-------------+----------------------+---------+------------------+
| Yes         |   1 Shots of liquor  | 1.0     | "social drinker" |
|             |  0 Standard drinks   |         |                  |
|             | or equivalent        |         |                  |
+-------------+----------------------+---------+------------------+
 
 
 
+------------------+---------------+
| Sex Assigned at  | Date Recorded |
 
| Birth            |               |
+------------------+---------------+
| Not on file      |               |
+------------------+---------------+
 documented as of this encounter
 
 Last Filed Vital Signs
 
 
+-------------------+---------------------+----------------------+----------+
| Vital Sign        | Reading             | Time Taken           | Comments |
+-------------------+---------------------+----------------------+----------+
| Blood Pressure    | 122/74              | 2017 11:45 AM  |          |
|                   |                     | PDT                  |          |
+-------------------+---------------------+----------------------+----------+
| Pulse             | 122                 | 2017 11:45 AM  |          |
|                   |                     | PDT                  |          |
+-------------------+---------------------+----------------------+----------+
| Temperature       | 36.5   C (97.7   F) | 2017 11:16 AM  |          |
|                   |                     | PDT                  |          |
+-------------------+---------------------+----------------------+----------+
| Respiratory Rate  | 13                  | 2017 11:45 AM  |          |
|                   |                     | PDT                  |          |
+-------------------+---------------------+----------------------+----------+
| Oxygen Saturation | 96%                 | 2017 11:45 AM  |          |
|                   |                     | PDT                  |          |
+-------------------+---------------------+----------------------+----------+
| Inhaled Oxygen    | -                   | -                    |          |
| Concentration     |                     |                      |          |
+-------------------+---------------------+----------------------+----------+
| Weight            | 59.9 kg (132 lb)    | 2017  8:39 AM  |          |
|                   |                     | PDT                  |          |
+-------------------+---------------------+----------------------+----------+
| Height            | 167.6 cm (5' 6")    | 2017  8:39 AM  |          |
|                   |                     | PDT                  |          |
+-------------------+---------------------+----------------------+----------+
| Body Mass Index   | 21.31               | 2017  8:39 AM  |          |
|                   |                     | PDT                  |          |
+-------------------+---------------------+----------------------+----------+
 documented in this encounter
 
 Medications at Time of Discharge
 
 
+----------------------+----------------------+-----------+---------+----------+-----------+
| Medication           | Sig                  | Dispensed | Refills | Start    | End Date  |
|                      |                      |           |         | Date     |           |
+----------------------+----------------------+-----------+---------+----------+-----------+
|                      | Take 1,500 mg by     |           | 0       |          |           |
| Calcium-Magnesium-Vi | mouth.               |           |         |          |           |
| tamin D (CITRACAL    |                      |           |         |          |           |
| SLOW RELEASE)        |                      |           |         |          |           |
| 600-           |                      |           |         |          |           |
| MG-MG-UNIT TB24      |                      |           |         |          |           |
+----------------------+----------------------+-----------+---------+----------+-----------+
|   cholecalciferol    | Take 400 Units by    |           | 0       | 20 |           |
| (VITAMIN D-3) 400    | mouth Daily.         |           |         | 12       |           |
| units TABS           |                      |           |         |          |           |
+----------------------+----------------------+-----------+---------+----------+-----------+
|   ipratropium        | INSTILL TWO SPRAYS   |   15 mL   | 5       | 20 |           |
 
| (ATROVENT) 0.06%     | IN EACH NOSTRIL UP   |           |         | 17       |           |
| nasal spray          | TO FOUR TIMES DAILY  |           |         |          |           |
|                      | AS NEEDED            |           |         |          |           |
+----------------------+----------------------+-----------+---------+----------+-----------+
|   Polyethylene       | Apply  to eye Daily  |           | 0       |          |           |
| Glycol 400 (BLINK    | as needed.           |           |         |          |           |
| TEARS OP)            |                      |           |         |          |           |
+----------------------+----------------------+-----------+---------+----------+-----------+
|   estradiol          | Place 10 mcg         |           | 0       |          |  |
| (VAGIFEM) 10 mcg     | vaginally as needed. |           |         |          | 0         |
| vaginal tablet       |                      |           |         |          |           |
+----------------------+----------------------+-----------+---------+----------+-----------+
|   lovastatin         | Take 20 mg by mouth  |           | 0       |          |  |
| (MEVACOR) 20 mg      | nightly.             |           |         |          | 8         |
| tablet               |                      |           |         |          |           |
+----------------------+----------------------+-----------+---------+----------+-----------+
|   Lysine 500 MG CAPS | Take 500 mg by mouth |           | 0       |          |  |
|                      |  Daily.              |           |         |          | 0         |
+----------------------+----------------------+-----------+---------+----------+-----------+
|   metoprolol         | one tablet by mouth  |           | 1       | 20 |  |
| succinate            | daily                |           |         | 17       | 0         |
| (TOPROL-XL) 25 mg 24 |                      |           |         |          |           |
|  hr tablet           |                      |           |         |          |           |
+----------------------+----------------------+-----------+---------+----------+-----------+
|   mupirocin          | Apply topically to   |   22 g    | 1       | 20 |  |
| (BACTROBAN) 2%       | the nose as          |           |         | 15       | 0         |
| ointment             | directed.            |           |         |          |           |
+----------------------+----------------------+-----------+---------+----------+-----------+
|   rivaroxaban        | Take 20 mg by mouth  |           | 0       |          |  |
| (XARELTO) 20 mg      | Daily.               |           |         |          | 0         |
| tablet               |                      |           |         |          |           |
+----------------------+----------------------+-----------+---------+----------+-----------+
|   sertraline         | 1 and 2 tablet by  |           | 0       | 20 |  |
| (ZOLOFT) 50 mg       | mouth daily          |           |         | 12       | 0         |
| tablet               |                      |           |         |          |           |
+----------------------+----------------------+-----------+---------+----------+-----------+
 documented as of this encounter
 
 H&P Notes
 Mahin Leiva MD - 2017 10:29 AM PDTThe patient has no questions consent form is si
gned per request of her cardiologist 10 primary care internist she has remained on her Xarel
to.  Antibiotics have been administeredElectronically signed by Mahin Leiva MD at 
017 10:30 AM PDTMahin Leiva MD - 2017  8:57 AM PDTFormatting of this note might be
 different from the original.
 
                                           PRE-ENDOSCOPY HISTORY AND PRE-SEDATION           
                              ASSESSMENT
 
 PATIENT NAME: Susana Colbert     : 1950   
 
 TODAY'S DATE: 2017                
 
 PLANNED PROCEDURE: upper endoscopy and colonoscopy
 
 PERTINENT HISTORY/INDICATION FOR PROCEDURE:  Susana Colbert is a 66 y.o. female who
 is undergoing upper endoscopy and colonoscopy for evaluation of reflux and change in bowel 
habits. The patient has had a change in bowel pattern over the past year. She reports that
 she has looser stools up to 3-4 times a day 2-3 times a week. She has urgency and has had
 occasional episodes of fecal soiling. She denies nocturnal symptoms. She denies blood o
r mucus in the stool. She's had no abdominal pain. Foods that used to precipitate her sy
 
mptomatology include salads or roughage. She has avoided foods and has continued to have a
 change in her bowel pattern. She does not consume caffeinated beverages nonabsorbable sug
ars or simple sugars. She has had no change in her medications except for the addition of 
Xarelto which has been for several years. The patient does have a history of adenomatous p
olyps removed from the hepatic flexure and . The patient previously had constipation w
hich was successfully treated with probiotics
 
 The patient also has symptoms of reflux. Typical retrosternal burning usually associated 
with spicy foods or eating late at night. She is on no prescription medications for the sa
me but does take Gaviscon on a regular basis. She denies dysphagia or odontophagia denies 
symptoms of aspiration supra-esophageal manifestations of reflux such as voice change aspira
tion etc. upper endoscopy in  was positive for gastroparesis. Biopsies of the esophagu
s were positive for reflux negative for Whitfield's metaplasia; patient previously was on Prot
christiana but has not been on the same for approximately a year.
 
 Review of the chart shows total knee replacement 2016. Her orthopedic surgeon told t
he patient that she should take prophylactic antibiotics. Atrial fibrillation,flutter on c
hronic anticoagulation Xarelto past history of ablation; anxiety depression. The patient r
eports that any stress precipitates an onset of atrial fibrillation and is concerned that th
e prep and/or procedure may precipitate the same.Will have patient remain on the Xaralto d
ue to atrial flutter. 
 
 PAST HISTORY:  
 Past Medical History 
 Diagnosis Date 
   Anomalous atrioventricular excitation 2014 
   Depression with anxiety 2014 
   Essential hypertension 2014 
   Hyperlipidemia 2014 
   Internal derangement of knee 2014 
   Osteopenia 2014 
   Postmenopausal disorder 2014 
   Unspecified essential hypertension 2014 
   Disorder of bone and cartilage, unspecified 2014 
   Unspecified internal derangement of knee 2014 
   Atrial fibrillation (HCC) 2014 
   Esophageal reflux 2014 
   Arthritis  
   Esophageal reflux  
   Atrial flutter (HCC)  
 
 PAST SURGICAL HISTORY 
 Past Surgical History 
 Procedure Laterality Date 
   Atrial ablation surgery   
   Dr. Wilson 
   Knee arthroscopy Left  
   Knee joint replacement Left 2015 
   Dr. Mike 
   Cataract removal   
   Vagina surgery   
   Colonoscopy   
   Tonsillectomy   
 
 HOME MEDS:  
 Prior to Admission medications  
 Medication Sig Taking? 
 Calcium-Magnesium-Vitamin D (CITRACAL SLOW RELEASE) 600- MG-MG-UNIT TB24 Take 1,500 m
g by mouth.  
 cholecalciferol (VITAMIN D-3) 400 units TABS Take 400 Units by mouth Daily.
 
 Patient taking differently: Take 2,000 Units by mouth Daily.  
 estradiol (VAGIFEM) 10 mcg vaginal tablet Place 10 mcg vaginally Daily.  
 ipratropium (ATROVENT) 0.06% nasal spray INSTILL TWO SPRAYS IN EACH NOSTRIL UP TO FOUR TIME
S DAILY AS NEEDED  
 lovastatin (MEVACOR) 20 mg tablet Take 20 mg by mouth nightly.  
 Lysine 500 MG CAPS Take 1,000 mg by mouth Daily.  
 mupirocin (BACTROBAN) 2% ointment Apply topically to the nose as directed.  
 Polyethylene Glycol 400 (BLINK TEARS OP) Apply  to eye 2 times daily.  
 rivaroxaban (XARELTO) 20 mg tablet Take 20 mg by mouth Every other day.  
 sertraline (ZOLOFT) 50 mg tablet 1 and 1/2 tablet by mouth daily
 Patient taking differently: Take 125 mg by mouth Daily.  
 
 ALLERGIES
 Allergies 
 Allergen Reactions 
   Moexipril Hydrochloride Shortness Of Breath 
   AKA Univasc 
   Metoclopramide Other (See Comments) 
   depression 
 
 ASA CLASSIFICATION:3
 
 EXAMINATION:
 Blood pressure 119/78, pulse 67, temperature 36.4 C (97.5 F), temperature source Tempor
al, resp. rate 18, height 1.676 m (5' 6"), weight 59.875 kg (132 lb), SpO2 100 %.
 General: Alert and orientedx3
 Throat: Normal
 Lungs: Clear
 Heart: Regular rate and rhythm with out significant murmur
 Abdomen: flat, normal bowel sounds. Soft, nontender 
 
 1. Available medical records have been reviewed.  [facility] 2017
 2. Medication list reviewed.
 
 IMPRESSION:  history of Whitfield's metaplasia of the general reflux history of colonic polyp
s.  Patient appropriate for procedures.
 
 PLAN:
 1. Proceed with procedure as stated above with propofol sedation/analgesia due to atrial fl
utter on Xeralto. 
 2. Procedure, indications, risks and alternatives explained to patient/family and they agre
ed to proceed and consent was signed.
 3. Patient will be reevaluated immediately (1-2 minutes) before sedation administration and
 approved for the plan as stated above.
  
 Electronically Signed by:  
 Mahin Leiva MD
 2017 
 [facility]
 
 Portions of this chart may have been created with Dragon voice recognition software. Occasi
onal wrong-word or   sound-alike  substitutions may have occurred due to the inherent neumann
itations of voice recognition software. Please read the chart carefully and recognize, using
 context, where these substitutions have occurred
 Electronically signed by Mahin Leiva MD at 2017 10:23 AM PDTdocumented in this enc
ounter
 
 Miscellaneous Notes
 Op Note - Mhain Leiva MD - 2017 11:11 AM PDTUpper endoscopy was remarkable for in
 a regular Z
 
 and 38 cm.  Biopsies were obtained did NBI images showing possible Whitfield's metaplasia.  T
here was no excessive bleeding noted with esophageal biopsies.  Gastroparesis was present H.
 pylori biopsy was obtained.  Duodenal mucosa.  Normal but biopsies obtained for celiac spru
e per protocol.  Colonoscopy was remarkable for Joshua redundancy of entire colon.  Mucosal se
rvices appeared normal.  Right and left colonic biopsies were obtained for microscopic colit
is colitis as were stool for stool studies.  It is noted that multiple biopsies were obtaine
d after initial test biopsy for H. pylori showed no excessive bleeding.  No excessive bleedi
ng was noted in multiple biopsy sitesElectronically signed by Mahin Leiva MD at 
7 11:12 AM PDTD-C Instructions Provation - Mahin Leiva MD - 2017 10:20 AM PDTDisch
arge Instructions for Upper Endoscopy 
 Patient:  Susana Colbert  
 :  1950          
 MRN: [id number]
 Acct:  [id number]
 Exam Date:  2017                                             
 
 Doctor:  Mahin Leiva MD  
 The chances of difficulty following this procedure are minimal.  The following  instruction
s will assist you in your recovery.
 1.  Do Not eat or drink anything for 1 hour.  Try sips of water first.  If  tolerated, resu
me your regular diet or one recommended by your physician.  2.  Do not drive, operate clint
carlos, make critical decisions, or do activities  that require coordination or balance for 24 
hours.
 3.  You may experience a sore throat for 24 - 48 hours.  You may use throat  lozenges or ga
rgle with warm salt water to relieve the discomfort.  4.  Because air was put into your stom
ach druing the procedure, you may  experience some belching.
 5.  Do not use any medication containing aspirin for 10 days, unless otherwise  directed by
 your physician.
 6.  Sometimes the medications given to you druing the exam can aggravate the  veins.  The c
hemical irritation can cause inflammation or pain along the arm  with redness, swelling and 
warmth.  This does not mean there is an infection.   You can treat the affected area by appl
chuck warm, wet compresses (towels) 4  times a day for 20 minutes at a time until inflammatio
n is resolved  7.  Report to your doctor: 
 Chills and/or fever over 100
 Persistent vomiting or vomiting with blood/nasal regurgitation  Severe abdominal pain, othe
r than gas cramps
 Severe chest pain
 Black, tarry stools
 You may reach your physician at Work:  [phone].  If unable to reach your  physician,
 call [facility] Emergency Department at [phone]  Ext. [phone]
 Your doctor recommends these additional instructions:
 You have a contact number available for emergencies.  The signs and symptoms of  potential 
delayed complications were discussed with you.  You may return to  normal activities tomorro
w.  Written discharge instructions were provided to  you. 
 You are being discharged to home. 
 Resume your previous diet today. 
 Continue your present medications. 
 We are waiting for your pathology results. 
 Your physician has recommended a colonoscopy today. 
 Return to your primary care physician as previously scheduled.  Telephone your GI clinic fo
r pathology results in one week.  Telephone your GI clinic if symptoms are present.
 These instructions have been explained to the patient and/or escort.  A copy has  been give
n to the patient/escort.
 _____________________________________      ________________________________  Nurse Signatur
e                                                  Patient  Signature
 _____________________________________      ________________________________  Escort Signatu
re                                                 Date  Mahin Leiva MD
 2017 11:18:53 AM
 This report has been signed electronically.Electronically signed by Mahin Leiva MD at  11:19 AM PDTD-C Instructions Provation - Mahin Leiva MD - 2017 10:10 AM P
 
DTDischarge Instructions for Colonoscopy Exams 
 Patient:  Susana Colbert  
 :  1950      
 MRN: [id number]
 Acct:  [id number]
 Exam Date:  2017                                             
 
 Doctor:  Mahin Leiva MD
 You have had an examination of the gastrointestinal tract.  The chances of  difficulty foll
owing this procedure are minimal.  The following instructions  will assist you in your recov
carlos.
 ACTIVITIES:
 Rest quietly until sedation wears off.
 DO NOT drive a motor vehicle or operate machinery for 24 hours after sedation.  Be cautious
 making critical decisions for 24 hours after sedation.  DIET:
 If throat has been sprayed, do not eat or drink for 1 hour after.  Start with a swallow of 
tap water, if you experience any lack of sensation in  your throat, wait another 30 - 60 min
utes and start with water again.  Once swallowing has returned to normal you may resume your
 usual diet unless  otherwise instructed by your physician.
 DISCOMFORT:
 If you had a bowel exam, you may have some abdominal discomfort from the air put  into your
 bowel during the exam.  Moving about will help you pass this air.  Sometimes the medication
s given to you during the exam can aggravate the veins.   The chemical irritation can cause 
inflammation or pain along the arm with  redness, swelling and warmth.  This does not mean t
here is an infection.  You  can treat the affected area by applying warm,wet compresses (tow
els) 4 times a  day for 20 minutes at a time until inflammation is resolved.  REPORT TO YOUR
 DOCTOR:
 Unusual abdominal pain
 Chest pain or unusual shortness of breath
 Shoulder pain
 Nausea, vomiting
 Fever over 100 degrees, chills
 Signs of rectal bleeding (red or black stools)
 Any concern you have resulting from procedure
 You may reach your physician at Work:  [phone].  If unable to reach your  physician,
 call [facility] Emergency Department at [phone]  Ext. [phone]
 Your doctor recommends these additional instructions:
 You have a contact number available for emergencies.  The signs and symptoms of  potential 
delayed complications were discussed with you.  You may return to  normal activities tomorro
w.  Written discharge instructions were provided to  you. 
 You are being discharged to home. 
 Resume your previous diet today. 
 Continue your present medications. 
 We are waiting for your pathology results. 
 Return to your primary care physician as previously scheduled.  Telephone your GI clinic fo
r pathology results in one week.  Telephone your GI clinic if symptoms are present.
 These instructions have been explained to the patient and/or escort.  A copy has  been give
n to the patient/escort.
 _____________________________________      ________________________________  Nurse Signatur
e                                                  Patient  Signature
 _____________________________________      ________________________________  Escort Signatu
re                                                 Date  Mahin Leiva MD
 2017 11:28:47 AM
 This report has been signed electronically.Electronically signed by Mahin Leiva MD at  11:29 AM PDTdocumented in this encounter
 
 Plan of Treatment
 
 
+--------+---------+-----------+----------------------+-------------+
 
| Date   | Type    | Specialty | Care Team            | Description |
+--------+---------+-----------+----------------------+-------------+
| 10/12/ | Office  | Neurology |   Jamin Brooks MD  [address] |             |
|    | Visit   |           |  [address]      |             |
|        |         |           | [address]  [place]  |             |
|        |         |           | [place] [address]             |             |
|        |         |           | [phone]         |             |
|        |         |           | [phone] (Fax)   |             |
+--------+---------+-----------+----------------------+-------------+
 documented as of this encounter
 
 Procedures
 
 
+----------------------+--------+-------------+----------------------+----------------------
+
| Procedure Name       | Priori | Date/Time   | Associated Diagnosis | Comments             
|
|                      | ty     |             |                      |                      
|
+----------------------+--------+-------------+----------------------+----------------------
+
| LACTOFERRIN, FECAL,  | Routin | 2017  |                      |   Results for this   
|
| QUAL                 | e      | 10:50 AM    |                      | procedure are in the 
|
|                      |        | PDT         |                      |  results section.    
|
+----------------------+--------+-------------+----------------------+----------------------
+
| CAMPYLOBACTER        | Routin | 2017  |                      |   Results for this   
|
| AG,QUAL              | e      | 10:49 AM    |                      | procedure are in the 
|
|                      |        | PDT         |                      |  results section.    
|
+----------------------+--------+-------------+----------------------+----------------------
+
| CULTURE, STOOL       | Routin | 2017  |                      |   Results for this   
|
| RESULT               | e      | 10:49 AM    |                      | procedure are in the 
|
|                      |        | PDT         |                      |  results section.    
|
+----------------------+--------+-------------+----------------------+----------------------
+
| SHIGATOXIN 1 AND 2   | Routin | 2017  |                      |   Results for this   
|
|                      | e      | 10:49 AM    |                      | procedure are in the 
|
|                      |        | PDT         |                      |  results section.    
|
+----------------------+--------+-------------+----------------------+----------------------
+
| OVA AND PARASITE     | Routin | 2017  |                      |   Results for this   
|
| EXAMINATION          | e      | 10:49 AM    |                      | procedure are in the 
|
|                      |        | PDT         |                      |  results section.    
|
 
+----------------------+--------+-------------+----------------------+----------------------
+
| CRYPTOSPORIDIUM AG   | Routin | 2017  |                      |   Results for this   
|
|                      | e      | 10:49 AM    |                      | procedure are in the 
|
|                      |        | PDT         |                      |  results section.    
|
+----------------------+--------+-------------+----------------------+----------------------
+
| GIARDIA AG, EIA,     | Routin | 2017  |                      |   Results for this   
|
| STOOL                | e      | 10:49 AM    |                      | procedure are in the 
|
|                      |        | PDT         |                      |  results section.    
|
+----------------------+--------+-------------+----------------------+----------------------
+
| CLOSTRIDIUM          | Routin | 2017  |                      |   Results for this   
|
| DIFFICILE A AND B    | e      | 10:49 AM    |                      | procedure are in the 
|
| EIA                  |        | PDT         |                      |  results section.    
|
+----------------------+--------+-------------+----------------------+----------------------
+
| CULTURE, STOOL       | Routin | 2017  |                      |   Results for this   
|
|                      | e      | 10:49 AM    |                      | procedure are in the 
|
|                      |        | PDT         |                      |  results section.    
|
+----------------------+--------+-------------+----------------------+----------------------
+
| HELICOBACTER PYLORI  | Routin | 2017  |                      |   Results for this   
|
| BIOPSY               | e      | 10:34 AM    |                      | procedure are in the 
|
|                      |        | PDT         |                      |  results section.    
|
+----------------------+--------+-------------+----------------------+----------------------
+
| COLONOSCOPY          |        | 2017  |   Gastroesophageal   |                      
|
|                      |        | 10:25 AM    | reflux disease,      |                      
|
|                      |        | PDT         | esophagitis presence |                      
|
|                      |        |             |  not specified       |                      
|
|                      |        |             | (K21.9), Diarrhea,   |                      
|
|                      |        |             | unspecified type     |                      
|
|                      |        |             | (R19.7), Atrial      |                      
|
|                      |        |             | fibrillation,        |                      
|
|                      |        |             | unspecified type     |                      
|
 
|                      |        |             | ([facility]) (I48.91),      |                      
|
|                      |        |             | Personal history of  |                      
|
|                      |        |             | colonic polyps       |                      
|
|                      |        |             | (Z86.010), Advanced  |                      
|
|                      |        |             | Age (R54)            |                      
|
+----------------------+--------+-------------+----------------------+----------------------
+
| EGD                  |        | 2017  |   Gastroesophageal   |                      
|
|                      |        | 10:25 AM    | reflux disease,      |                      
|
|                      |        | PDT         | esophagitis presence |                      
|
|                      |        |             |  not specified       |                      
|
|                      |        |             | (K21.9), Diarrhea,   |                      
|
|                      |        |             | unspecified type     |                      
|
|                      |        |             | (R19.7), Atrial      |                      
|
|                      |        |             | fibrillation,        |                      
|
|                      |        |             | unspecified type     |                      
|
|                      |        |             | ([facility]) (I48.91),      |                      
|
|                      |        |             | Personal history of  |                      
|
|                      |        |             | colonic polyps       |                      
|
|                      |        |             | (Z86.010), Advanced  |                      
|
|                      |        |             | Age (R54)            |                      
|
+----------------------+--------+-------------+----------------------+----------------------
+
| EGD                  | Routin | 2017  |                      |   Results for this   
|
|                      | e      | 10:20 AM    |                      | procedure are in the 
|
|                      |        | PDT         |                      |  results section.    
|
+----------------------+--------+-------------+----------------------+----------------------
+
| ECG 12 LEAD          | STAT   | 2017  |                      |   Results for this   
|
|                      |        | 10:16 AM    |                      | procedure are in the 
|
|                      |        | PDT         |                      |  results section.    
|
+----------------------+--------+-------------+----------------------+----------------------
+
| COLONOSCOPY          | Routin | 2017  |                      |   Results for this   
|
 
|                      | e      | 10:10 AM    |                      | procedure are in the 
|
|                      |        | PDT         |                      |  results section.    
|
+----------------------+--------+-------------+----------------------+----------------------
+
| SURGICAL PATHOLOGY   | Routin | 2017  |                      |   Results for this   
|
| EXAM                 | e      | 12:00 AM    |                      | procedure are in the 
|
|                      |        | PDT         |                      |  results section.    
|
+----------------------+--------+-------------+----------------------+----------------------
+
 documented in this encounter
 
 Results
 Lactoferrin, Fecal, Qual (2017 10:50 AM PDT)
 
+-------------+----------+-----------+-------------+--------------+
| Component   | Value    | Ref Range | Performed   | Pathologist  |
|             |          |           | At          | Signature    |
+-------------+----------+-----------+-------------+--------------+
| Lactoferrin | Negative | Negative  | [facility]  |              |
| , Qual      |          |           | [facility]    |              |
|             |          |           | MEDICAL     |              |
|             |          |           | CENTER -    |              |
|             |          |           | LABORATORY  |              |
+-------------+----------+-----------+-------------+--------------+
 
 
 
+---------------------+
| Specimen            |
+---------------------+
| Stool - Stool       |
| specimen (specimen) |
+---------------------+
 
 
 
+----------------------+--------------------+--------------------+----------------+
| Performing           | Address            | City/State/Zipcode | Phone Number   |
| Organization         |                    |                    |                |
+----------------------+--------------------+--------------------+----------------+
|   [facility]     |   [address] |   [place]  |   [phone] |
| [facility]  |                    | [address]              |                |
| - LABORATORY         |                    |                    |                |
+----------------------+--------------------+--------------------+----------------+
 Campylobacter Ag,Qual (2017 10:49 AM PDT)
 
+-------------+--------------------------+-----------+-------------+--------------+
| Component   | Value                    | Ref Range | Performed   | Pathologist  |
|             |                          |           | At          | Signature    |
+-------------+--------------------------+-----------+-------------+--------------+
| Campylobact | Positive (A)Comment:     | Negative  | [facility]  |              |
| er AG, Qual | Result called to and     |           | [facility]    |              |
|             | read back by [facility]       |           | MEDICAL     |              |
|             | Dex on 2017  |           | CENTER -    |              |
|             | at 12:56 by Gilmar Farmer. |           | LABORATORY  |              |
 
|             |   **THIS IS A REPORTABLE |           |             |              |
|             |  DISEASE**Please report  |           |             |              |
|             | to [facility]    |           |             |              |
|             | Department at            |           |             |              |
|             | [phone] within 24   |           |             |              |
|             | hours.                   |           |             |              |
+-------------+--------------------------+-----------+-------------+--------------+
 
 
 
+---------------------+
| Specimen            |
+---------------------+
| Stool - Stool       |
| specimen (specimen) |
+---------------------+
 
 
 
+----------------------+--------------------+--------------------+----------------+
| Performing           | Address            | City/State/Zipcode | Phone Number   |
| Organization         |                    |                    |                |
+----------------------+--------------------+--------------------+----------------+
|   [facility]     |   [address] |   [place]  |   [phone] |
| [facility]  |                    | [address]              |                |
| - LABORATORY         |                    |                    |                |
+----------------------+--------------------+--------------------+----------------+
 Culture, Stool Result (2017 10:49 AM PDT)
 
+-----------+--------------------------+-----------+-------------+--------------+
| Component | Value                    | Ref Range | Performed   | Pathologist  |
|           |                          |           | At          | Signature    |
+-----------+--------------------------+-----------+-------------+--------------+
| Culture   | No Salmonella, Shigella, |           | [facility]  |              |
|           |  Aeromonas, Plesiomonas, |           | [facility]    |              |
|           |  E. coli O157 or         |           | MEDICAL     |              |
|           | Yersinia isolated.       |           | CENTER -    |              |
|           |                          |           | LABORATORY  |              |
+-----------+--------------------------+-----------+-------------+--------------+
| Culture   | 1+ Mixed Gram Positive   |           | [facility]  |              |
|           | FloraComment: Consistent |           | [facility]    |              |
|           |  with usual enteric      |           | MEDICAL     |              |
|           | emanuel.No gram negative   |           | CENTER -    |              |
|           | enteric emanuel isolated   |           | LABORATORY  |              |
+-----------+--------------------------+-----------+-------------+--------------+
 
 
 
+---------------------+
| Specimen            |
+---------------------+
| Stool - Stool       |
| specimen (specimen) |
+---------------------+
 
 
 
+----------------------+--------------------+--------------------+----------------+
| Performing           | Address            | City/State/Zipcode | Phone Number   |
| Organization         |                    |                    |                |
 
+----------------------+--------------------+--------------------+----------------+
|   [facility]     |   [address] |   [place]  |   [phone] |
| [facility]  |                    | [address]              |                |
| - LABORATORY         |                    |                    |                |
+----------------------+--------------------+--------------------+----------------+
 Shigatoxin 1 and 2 (2017 10:49 AM PDT)
 
+-------------+----------+-----------+-------------+--------------+
| Component   | Value    | Ref Range | Performed   | Pathologist  |
|             |          |           | At          | Signature    |
+-------------+----------+-----------+-------------+--------------+
| Shigatoxin  | Negative | Negative  | [facility]  |              |
| 1           |          |           | [facility]    |              |
|             |          |           | MEDICAL     |              |
|             |          |           | CENTER -    |              |
|             |          |           | LABORATORY  |              |
+-------------+----------+-----------+-------------+--------------+
| Shigatoxin  | Negative | Negative  | [facility]  |              |
| 2           |          |           | [facility]    |              |
|             |          |           | MEDICAL     |              |
|             |          |           | CENTER -    |              |
|             |          |           | LABORATORY  |              |
+-------------+----------+-----------+-------------+--------------+
 
 
 
+---------------------+
| Specimen            |
+---------------------+
| Stool - Stool       |
| specimen (specimen) |
+---------------------+
 
 
 
+----------------------+--------------------+--------------------+----------------+
| Performing           | Address            | City/State/Zipcode | Phone Number   |
| Organization         |                    |                    |                |
+----------------------+--------------------+--------------------+----------------+
|   [facility]     |   [address] |   [place]  |   [phone] |
| [facility]  |                    | [address]              |                |
| - LABORATORY         |                    |                    |                |
+----------------------+--------------------+--------------------+----------------+
 Ova and Parasite Examination (2017 10:49 AM PDT)
 
+-------------+--------------------------+-----------+------------+--------------+
| Component   | Value                    | Ref Range | Performed  | Pathologist  |
|             |                          |           | At         | Signature    |
+-------------+--------------------------+-----------+------------+--------------+
| O/P IDENT   | See CommentsComment:     |           | [facility]  |              |
|             | Accession No.            |           | [facility]   |              |
|             |                          |           |            |              |
|             |   [id number]       |           |            |              |
|             | Source                   |           |            |              |
|             |          StoolResult     |           |            |              |
|             |                          |           |            |              |
|             |             Fecal        |           |            |              |
|             | leukocytes seen          |           |            |              |
|             |                          |           |            |              |
|             |                 No Ova   |           |            |              |
 
|             | or Parasites seen        |           |            |              |
|             |                          |           |            |              |
|             |                   This   |           |            |              |
|             | test will not detect     |           |            |              |
|             | Cyclospora,              |           |            |              |
|             |                          |           |            |              |
|             |                          |           |            |              |
|             |   Cryptosporidium or     |           |            |              |
|             | Cystoisospora            |           |            |              |
|             |                          |           |            |              |
|             |                          |           |            |              |
|             |   (Isospora). For these  |           |            |              |
|             | organisms                |           |            |              |
|             |                          |           |            |              |
|             |             refer to     |           |            |              |
|             | Coccidia Stain (test     |           |            |              |
|             | code                     |           |            |              |
|             |                          |           |            |              |
|             |         [facility]).          |           |            |              |
+-------------+--------------------------+-----------+------------+--------------+
| O/P REPORT  | Report Status            |           | [facility]  |              |
| STAT        |          Final           |           | [facility]   |              |
|             | 2017Comment:       |           |            |              |
|             | Testing Performed:       |           |            |              |
|             | [facility]  |           |            |              |
|             | [facility], [address]    |           |            |              |
|             | [address] [place] [address]   |           |            |              |
+-------------+--------------------------+-----------+------------+--------------+
 
 
 
+--------------------+
| Specimen           |
+--------------------+
| Stool specimen     |
| (specimen) - Stool |
+--------------------+
 
 
 
+----------------------+----------------------+---------------------+----------------+
| Performing           | Address              | City/State/Zipcode  | Phone Number   |
| Organization         |                      |                     |                |
+----------------------+----------------------+---------------------+----------------+
|   [facility] |   [address] |   [place] [address] |   [phone] |
+----------------------+----------------------+---------------------+----------------+
 Clostridium difficile A and B EIA (2017 10:49 AM PDT)
 
+-------------+--------------------------+-----------+-------------+--------------+
| Component   | Value                    | Ref Range | Performed   | Pathologist  |
|             |                          |           | At          | Signature    |
+-------------+--------------------------+-----------+-------------+--------------+
| Clostridium | NegativeComment:         | Negative  | [facility]  |              |
|  Difficile  | Negative for toxigenic   |           | [facility]    |              |
| [facility] Antigen | Clostridium difficile    |           | MEDICAL     |              |
|             |                          |           | CENTER -    |              |
|             |                          |           | LABORATORY  |              |
+-------------+--------------------------+-----------+-------------+--------------+
| C. Diff     | NegativeComment: No data | Negative  | [facility]  |              |
| Toxin A/B   |  exists on the effects   |           | [facility]    |              |
 
| EIA         | of colonic washes,       |           | MEDICAL     |              |
|             | barium enemas,           |           | CENTER -    |              |
|             | laxatives, or bowel      |           | LABORATORY  |              |
|             | preparations on the      |           |             |              |
|             | performance of this      |           |             |              |
|             | test. All of these       |           |             |              |
|             | procedures can result in |           |             |              |
|             |  extensive dilution or   |           |             |              |
|             | the presence of          |           |             |              |
|             | additives that may       |           |             |              |
|             | affect test performance. |           |             |              |
+-------------+--------------------------+-----------+-------------+--------------+
 
 
 
+---------------------+
| Specimen            |
+---------------------+
| Stool - Stool       |
| specimen (specimen) |
+---------------------+
 
 
 
+----------------------+--------------------+--------------------+----------------+
| Performing           | Address            | City/State/Zipcode | Phone Number   |
| Organization         |                    |                    |                |
+----------------------+--------------------+--------------------+----------------+
|   [facility]     |   [address] |   [place]  |   [phone] |
| [facility]  |                    | [address]              |                |
| - LABORATORY         |                    |                    |                |
+----------------------+--------------------+--------------------+----------------+
 Giardia Ag, EIA, Stool (2017 10:49 AM PDT)
 
+-----------+----------+-----------+-------------+--------------+
| Component | Value    | Ref Range | Performed   | Pathologist  |
|           |          |           | At          | Signature    |
+-----------+----------+-----------+-------------+--------------+
| Giardia   | Negative | Negative  | [facility]  |              |
| Antigen,  |          |           | [facility]    |              |
| Stool     |          |           | MEDICAL     |              |
|           |          |           | CENTER -    |              |
|           |          |           | LABORATORY  |              |
+-----------+----------+-----------+-------------+--------------+
 
 
 
+---------------------+
| Specimen            |
+---------------------+
| Stool - Stool       |
| specimen (specimen) |
+---------------------+
 
 
 
+----------------------+--------------------+--------------------+----------------+
| Performing           | Address            | City/State/Zipcode | Phone Number   |
| Organization         |                    |                    |                |
+----------------------+--------------------+--------------------+----------------+
 
|   [facility]     |   [address] |   [place]  |   [phone] |
| [facility]  |                    | [address]              |                |
| - LABORATORY         |                    |                    |                |
+----------------------+--------------------+--------------------+----------------+
 Cryptosporidium Ag (2017 10:49 AM PDT)
 
+-------------+----------+-----------+-------------+--------------+
| Component   | Value    | Ref Range | Performed   | Pathologist  |
|             |          |           | At          | Signature    |
+-------------+----------+-----------+-------------+--------------+
| Cryptospori | Negative | Negative  | [facility]  |              |
| dium        |          |           | [facility]    |              |
| Antigen     |          |           | MEDICAL     |              |
|             |          |           | CENTER -    |              |
|             |          |           | LABORATORY  |              |
+-------------+----------+-----------+-------------+--------------+
 
 
 
+---------------------+
| Specimen            |
+---------------------+
| Stool - Stool       |
| specimen (specimen) |
+---------------------+
 
 
 
+----------------------+--------------------+--------------------+----------------+
| Performing           | Address            | City/State/Zipcode | Phone Number   |
| Organization         |                    |                    |                |
+----------------------+--------------------+--------------------+----------------+
|   [facility]     |   [address] |   [place]  |   [phone] |
| [facility]  |                    | [address]              |                |
| - LABORATORY         |                    |                    |                |
+----------------------+--------------------+--------------------+----------------+
 Helicobactor pylori Biopsy (2017 10:34 AM PDT)
 
+-------------+----------+-----------+-------------+--------------+
| Component   | Value    | Ref Range | Performed   | Pathologist  |
|             |          |           | At          | Signature    |
+-------------+----------+-----------+-------------+--------------+
| Helicobacte | Negative | Negative  | [facility]  |              |
| r pylori Ag |          |           | [facility]    |              |
|             |          |           | MEDICAL     |              |
|             |          |           | CENTER -    |              |
|             |          |           | LABORATORY  |              |
+-------------+----------+-----------+-------------+--------------+
 
 
 
+----------------------+
| Specimen             |
+----------------------+
| Tissue - Entire      |
| pyloric antrum (body |
|  structure)          |
+----------------------+
 
 
 
 
+----------------------+--------------------+--------------------+----------------+
| Performing           | Address            | City/State/Zipcode | Phone Number   |
| Organization         |                    |                    |                |
+----------------------+--------------------+--------------------+----------------+
|   [facility]     |   [address] |   [place]  |   [phone] |
| [facility]  |                    | [address]              |                |
| - LABORATORY         |                    |                    |                |
+----------------------+--------------------+--------------------+----------------+
 EGD (2017 10:20 AM PDT)
 
+----------+
| Specimen |
+----------+
|          |
+----------+
 
 
 
+-----------------------------------------------------------------------------+-------------
-+
| Narrative                                                                   | Performed At
 |
+-----------------------------------------------------------------------------+-------------
-+
|   This result has an attachment that is not available.                      |   [facility]      
 |
| GastroenterologyPatient Name: Susana Bhatia Date: 2017         | [facility]   
 |
| 10:20 AMMRN: [id number] #: [id number] of Birth:                |             
 |
| 9Admit Type: AmbulatoryAge: 66Room: [facility] 01Gender: FemaleNote      |             
 |
|  Status: FinalizedAttending MD: Mahin Leiva MDProcedure:               |             
 |
|     Upper GI endoscopyIndications:          Esophageal reflux,              |             
 |
| Follow-up of esophageal reflux, DiarrheaProviders:             Mahin        |             
 |
| ARLEY Leiva MD, Sarah Beth Kowalski RN, Shiv CHOWDARY                         |             
 |
|           WOLFGANG Cross, Jose Carias MD (Anesthesia                    |             
 |
| Staff)Referring MD:         Tobin Alex MD (Referring                |             
 |
| MD)Medicines:             Sedation Required Anesthesia Staff                |             
 |
| AssistanceComplications:       No immediate complications. Estimated        |             
 |
| blood loss: Minimal.Procedure:       Pre-Anesthesia Assessment:             |             
 |
| - Prior to the procedure, a History and Physical was performed, and         |             
 |
|       patient medications, allergies and sensitivities were reviewed.       |             
 |
| The        patient's tolerance of previous anesthesia was reviewed.         |             
 |
|     - Prior to the procedure, a History and Physical was performed,         |             
 |
| and        patient medications and allergies were reviewed. The             |             
 
 |
| patient is        competent. The risks and benefits of the procedure        |             
 |
| and the sedation        options and risks were discussed with the           |             
 |
| patient. All questions were        answered and informed consent was        |             
 |
| obtained. Patient identification and        proposed procedure were         |             
 |
| verified by the physician, the nurse, the        anesthesiologist and       |             
 |
| the technician in the endoscopy suite. Mental        Status                 |             
 |
| Examination: alert and oriented. Airway Examination: normal                 |             
 |
| oropharyngeal airway and neck mobility and Mallampati Class II (the         |             
 |
|       uvula but not tonsillar pillars visualized). Respiratory              |             
 |
| Examination:        clear to auscultation. CV Examination: regular          |             
 |
| rate and rhythm.        Prophylactic Antibiotics: The patient requires      |             
 |
|  prophylactic antibiotics        due to a prior history of prosthetic       |             
 |
| joint replacement. Prior        Anticoagulants: The patient has taken       |             
 |
| Xarelto (rivaroxaban), last dose        was day of procedure. ASA           |             
 |
| Grade Assessment: III - A patient with severe        systemic disease.      |             
 |
|  After reviewing the risks and benefits, the patient        was deemed      |             
 |
|  in satisfactory condition to undergo the procedure. The                    |             
 |
| anesthesia plan was to use monitored anesthesia care (MAC).                 |             
 |
| Immediately        prior to administration of medications, the patient      |             
 |
|  was re-assessed for        adequacy to receive sedatives. The heart        |             
 |
| rate, respiratory rate, oxygen        saturations, blood pressure,          |             
 |
| adequacy of pulmonary ventilation, and        response to care were         |             
 |
| monitored throughout the procedure. The physical        status of the       |             
 |
| patient was re-assessed after the procedure.       - After reviewing        |             
 |
| the risks and benefits, the patient was deemed in        satisfactory       |             
 |
| condition to undergo the procedure.       - Using IV propofol under         |             
 |
| the supervision of an anesthesiologist was        determined to be          |             
 |
| medically necessary for this procedure based on age 64        or            |             
 |
| older, severe comorbidity (greater than ASA Grade II), prolonged            |             
 |
|    procedure requiring deep sedation and patient's history of problems      |             
 
 |
|  with        anesthesia.       - Immediately prior to administration        |             
 |
| of medications, the patient was        re-assessed for adequacy to          |             
 |
| receive sedatives.       - The heart rate, respiratory rate, oxygen         |             
 |
| saturations, blood pressure,        adequacy of pulmonary ventilation,      |             
 |
|  and response to care were monitored        throughout the procedure.       |             
 |
|       - The physical status of the patient was re-assessed after the        |             
 |
| procedure.       After obtaining informed consent, the endoscope was        |             
 |
| passed under direct        vision. Throughout the procedure, the            |             
 |
| patient's blood pressure, pulse,        and oxygen saturations were         |             
 |
| monitored continuously. The Endoscope was        introduced through         |             
 |
| the mouth, and advanced to the third part of        duodenum. The           |             
 |
| upper GI endoscopy was accomplished without difficulty.        The          |             
 |
| patient tolerated the procedure well.Findings:       The                    |             
 |
| cricopharyngeus, upper third of the esophagus, middle third of the          |             
 |
|     esophagus and lower third of the esophagus were normal.       The       |             
 |
| Z-line was irregular and was found 38 cm from the incisors. Biopsies        |             
 |
|       were taken with a cold forceps for histology. Verification of         |             
 |
| patient        identification for the specimen was done. Estimated          |             
 |
| blood loss was        minimal.       Suspect gastroparesis due to           |             
 |
| absence of peristalsis. Biopsies were taken        with a cold forceps      |             
 |
|  for Helicobacter pylori testing using CLOtest.        Verification of      |             
 |
|  patient identification for the specimen was done.        Estimated         |             
 |
| blood loss was minimal.       The duodenal bulb, first portion of the       |             
 |
| duodenum, second portion of the        duodenum, area of the papilla        |             
 |
| and third portion of the duodenum were        normal. Biopsies for          |             
 |
| histology were taken with a cold forceps for        evaluation of           |             
 |
| celiac disease. Verification of patient identification for        the       |             
 |
| specimen was done. Estimated blood loss was minimal.       The              |             
 |
| retroflexed view confirmed previous findings,Impression:       -            |             
 |
| Normal cricopharyngeus, upper third of esophagus, middle third of           |             
 
 |
|    esophagus and lower third of esophagus.       - Z-line irregular,        |             
 |
| 38 cm from the incisors. Biopsied.       - Gastroparesis. Biopsied.         |             
 |
|     - Normal duodenal bulb, first portion of the duodenum, second           |             
 |
| portion of        the duodenum, area of the papilla and third portion       |             
 |
| of the duodenum.        Biopsied.       - The retroflexed view              |             
 |
| confirmed previous findings,       - After a test biopsy for H. pylori      |             
 |
|  to see if there would be excessive        bleeding which there was         |             
 |
| not biopsies were obtained have the duodenum        mucosa in of the        |             
 |
| distal esophagus.Recommendation:       - Patient has a contact number       |             
 |
| available for emergencies. The signs and        symptoms of potential       |             
 |
| delayed complications were discussed with the        patient. Return        |             
 |
| to normal activities tomorrow. Written discharge        instructions        |             
 |
| were provided to the patient.       - Discharge patient to home             |             
 |
| (ambulatory).       - Resume previous diet today.       - Continue          |             
 |
| present medications.       - Await pathology results.       - Perform       |             
 |
| a colonoscopy today.       - Return to primary care physician as            |             
 |
| previously scheduled.       - Telephone GI clinic for pathology             |             
 |
| results in 1 week.       - Telephone GI clinic if symptomatic.Mahin TUBBS      |             
 |
|  MD Cali2017 11:18:53 AMThis report has been signed                  |             
 |
| electronically.Number of Addenda: 0Note Initiated On: 2017 10:20       |             
 |
| AMTotal Procedure Duration: 0 hours 8 minutes 53 seconds Scope In:          |             
 |
| 10:32:15 AMScope Out: 10:41:08 AM       [facility]         |             
 |
| Center, [address] [place] [address]        [id number] [phone]          |             
 |
|       - Resume previous diet today.                                         |             
 |
|       - Continue present medications.                                       |             
 |
|       - Await pathology results.                                            |             
 |
|       - Perform a colonoscopy today.                                        |             
 |
|       - Return to primary care physician as previously scheduled.           |             
 |
|       - Telephone GI clinic for pathology results in 1 week.                |             
 |
|       - Telephone GI clinic if symptomatic.                                 |             
 
 |
|Mahin Leiva MD                                                           |             
 |
|2017 11:18:53 AM                                                        |             
 |
|This report has been signed electronically.                                  |             
 |
|Number of Addenda: 0                                                         |             
 |
|Note Initiated On: 2017 10:20 AM                                        |             
 |
|Total Procedure Duration: 0 hours 8 minutes 53 seconds                       |             
 |
|Scope In: 10:32:15 AM                                                        |             
 |
|Scope Out: 10:41:08 AM                                                       |             
 |
|       [facility], [address] [place] [address]  |             
 |
|       [phone]                                                    |             
 |
+-----------------------------------------------------------------------------+-------------
-+
 
 
 
+------------------+---------+--------------------+--------------+
| Performing       | Address | City/State/Zipcode | Phone Number |
| Organization     |         |                    |              |
+------------------+---------+--------------------+--------------+
|   [facility] |         |                    |              |
+------------------+---------+--------------------+--------------+
 ECG 12 lead (2017 10:16 AM PDT)
 
+-------------+--------------------------+-----------+------------+--------------+
| Component   | Value                    | Ref Range | Performed  | Pathologist  |
|             |                          |           | At         | Signature    |
+-------------+--------------------------+-----------+------------+--------------+
| VENTRICULAR | 67                       | BPM       | [facility]  |              |
|  RATE EKG   |                          |           |            |              |
+-------------+--------------------------+-----------+------------+--------------+
| ATRIAL RATE | 268                      | BPM       | [facility]  |              |
+-------------+--------------------------+-----------+------------+--------------+
| QRS         | 80                       | ms        | [facility]  |              |
| DURATION    |                          |           |            |              |
+-------------+--------------------------+-----------+------------+--------------+
| Q-T         | 408                      | ms        | [facility]  |              |
| INTERVAL    |                          |           |            |              |
+-------------+--------------------------+-----------+------------+--------------+
| Q-T         | 431                      | ms        | [facility]  |              |
| INTERVAL    |                          |           |            |              |
| (CORRECTED) |                          |           |            |              |
+-------------+--------------------------+-----------+------------+--------------+
| P WAVE AXIS | 97                       | degrees   | [facility]  |              |
+-------------+--------------------------+-----------+------------+--------------+
| QRS AXIS    | 61                       | degrees   | [facility]  |              |
+-------------+--------------------------+-----------+------------+--------------+
| T AXIS      | 56                       | degrees   | [facility]  |              |
+-------------+--------------------------+-----------+------------+--------------+
| INTERPRETAT | Atrial flutter with 4:1  |           | [facility]  |              |
 
| ION TEXT    | AV                       |           |            |              |
|             | conductionAnteroseptal   |           |            |              |
|             | infarct , age            |           |            |              |
|             | undeterminedAbnormal     |           |            |              |
|             | ECGNo previous ECGs      |           |            |              |
|             | availableConfirmed by    |           |            |              |
|             | STUART MCMILLAN MD ([id number])  |           |            |              |
|             | on 4/15/2017 7:30:50 AM  |           |            |              |
+-------------+--------------------------+-----------+------------+--------------+
 
 
 
+----------+
| Specimen |
+----------+
|          |
+----------+
 
 
 
+----------------------------------------------------------+--------------+
| Narrative                                                | Performed At |
+----------------------------------------------------------+--------------+
|   This result has an attachment that is not available.   |              |
+----------------------------------------------------------+--------------+
 
 
 
+--------------+---------+--------------------+--------------+
| Performing   | Address | City/State/Zipcode | Phone Number |
| Organization |         |                    |              |
+--------------+---------+--------------------+--------------+
|   [facility]  |         |                    |              |
+--------------+---------+--------------------+--------------+
 COLONOSCOPY (2017 10:10 AM PDT)
 
+----------+
| Specimen |
+----------+
|          |
+----------+
 
 
 
+-----------------------------------------------------------------------------+-------------
-+
| Narrative                                                                   | Performed At
 |
+-----------------------------------------------------------------------------+-------------
-+
|   This result has an attachment that is not available.                      |   [facility]      
 |
| GastroenterologyPatient Name: Susana Bhatia Date: 2017         | [place]   
 |
| 10:10 AMMRN: [id number] #: [id number] of Birth:                |             
 |
| 9Admit Type: AmbulatoryAge: 66Room: [facility] 01Gender: FemaleNote      |             
 |
|  Status: FinalizedAttending MD: Mahin Leiva MDProcedure:               |             
 |
 
|     ColonoscopyIndications:          High risk colon cancer                 |             
 |
| surveillance: Personal history of                             colonic       |             
 |
| polyps, Incidental - Change in bowel habits,                                |             
 |
|    Incidental - DiarrheaProviders:             Mahin Leiva MD,          |             
 |
| Sarah Beth Kowalski RN, Shiv Cross,      |             
 |
|  [facility], Jose Carias MD (Anesthesia Staff)Referring MD:              |             
 |
|   Tobin Alex MD (Referring MD)Medicines:             Sedation       |             
 |
| Required Anesthesia Staff AssistanceComplications:       No immediate       |             
 |
| complications. Estimated blood loss: Minimal.Procedure:                     |             
 |
| Pre-Anesthesia Assessment:       - Prior to the procedure, a History        |             
 |
| and Physical was performed, and        patient medications, allergies       |             
 |
| and sensitivities were reviewed. The        patient's tolerance of          |             
 |
| previous anesthesia was reviewed.       - Prior to the procedure, a         |             
 |
| History and Physical was performed, and        patient medications and      |             
 |
|  allergies were reviewed. The patient is        competent. The risks        |             
 |
| and benefits of the procedure and the sedation        options and           |             
 |
| risks were discussed with the patient. All questions were                   |             
 |
| answered and informed consent was obtained. Patient identification and      |             
 |
|         proposed procedure were verified. Mental Status Examination:        |             
 |
| normal.        Prophylactic Antibiotics: The patient requires               |             
 |
| prophylactic antibiotics        due to a prior history of prosthetic        |             
 |
| joint replacement. Prior        Anticoagulants: The patient has taken       |             
 |
| Xarelto (rivaroxaban), last dose        was day of procedure. ASA           |             
 |
| Grade Assessment: III - A patient with severe        systemic disease.      |             
 |
|  After reviewing the risks and benefits, the patient        was deemed      |             
 |
|  in satisfactory condition to undergo the procedure. The                    |             
 |
| anesthesia plan was to use monitored anesthesia care (MAC).                 |             
 |
| Immediately        prior to administration of medications, the patient      |             
 |
|  was re-assessed for        adequacy to receive sedatives. The heart        |             
 |
| rate, respiratory rate, oxygen        saturations, blood pressure,          |             
 |
 
| adequacy of pulmonary ventilation, and        response to care were         |             
 |
| monitored throughout the procedure. The physical        status of the       |             
 |
| patient was re-assessed after the procedure.       - After reviewing        |             
 |
| the risks and benefits, the patient was deemed in        satisfactory       |             
 |
| condition to undergo the procedure.       - Using IV propofol under         |             
 |
| the supervision of an anesthesiologist was        determined to be          |             
 |
| medically necessary for this procedure based on age 64        or            |             
 |
| older, severe comorbidity (greater than ASA Grade II), prolonged            |             
 |
|    procedure requiring deep sedation and patient's history of problems      |             
 |
|  with        anesthesia.       - Immediately prior to administration        |             
 |
| of medications, the patient was        re-assessed for adequacy to          |             
 |
| receive sedatives.       - The heart rate, respiratory rate, oxygen         |             
 |
| saturations, blood pressure,        adequacy of pulmonary ventilation,      |             
 |
|  and response to care were monitored        throughout the procedure.       |             
 |
|       - The physical status of the patient was re-assessed after the        |             
 |
| procedure.       After I obtained informed consent, the scope was           |             
 |
| passed under direct        vision. Throughout the procedure, the            |             
 |
| patient's blood pressure, pulse,        and oxygen saturations were         |             
 |
| monitored continuously. The Colonoscope was        introduced through       |             
 |
| the anus and advanced to the cecum, identified by the                       |             
 |
| appendiceal orifice, ileocecal valve and palpation. The colonoscopy         |             
 |
| was        technically difficult and complex due to significant             |             
 |
| looping and a        tortuous colon. Successful completion of the           |             
 |
| procedure was aided by        using manual pressure, straightening and      |             
 |
|  shortening the scope to obtain        bowel loop reduction and using       |             
 |
| scope torsion. The patient tolerated the        procedure well. The         |             
 |
| quality of the bowel preparation was excellent.Findings:       The          |             
 |
| perianal and digital rectal examinations were normal. Pertinent             |             
 |
|  negatives include normal sphincter tone and no palpable rectal             |             
 |
| lesions.       The sigmoid colon, descending colon and splenic flexure      |             
 |
 
|  were        significantly tortuous. Biopsies for histology were taken      |             
 |
|  with a cold        forceps from the ascending colon, descending colon      |             
 |
|  and sigmoid colon for        evaluation of microscopic colitis.            |             
 |
| Estimated blood loss was minimal.       The exam was otherwise without      |             
 |
|  abnormality.       The retroflexed view of the distal rectum and anal      |             
 |
|  verge was normal and        showed no anal or rectal                       |             
 |
| abnormalities.Impression:       - Tortuous colon. Biopsied.       -         |             
 |
| The examination was otherwise normal.       - The distal rectum and         |             
 |
| anal verge are normal on retroflexion view.Recommendation:       -          |             
 |
| Patient has a contact number available for emergencies. The signs and       |             
 |
|        symptoms of potential delayed complications were discussed with      |             
 |
|  the        patient. Return to normal activities tomorrow. Written          |             
 |
| discharge        instructions were provided to the patient.       -         |             
 |
| Discharge patient to home (ambulatory).       - Resume previous diet        |             
 |
| today.       - Continue present medications.       - Await pathology        |             
 |
| results.       - Return to primary care physician as previously             |             
 |
| scheduled.       - Telephone GI clinic for pathology results in 1           |             
 |
| week.       - Telephone GI clinic if symptomatic.Mahin Leiva            |             
 |
| MD2017 11:28:47 AMThis report has been signed                          |             
 |
| electronically.Number of Addenda: 0Note Initiated On: 2017 10:10       |             
 |
| AMScope Withdrawal Time: 0 hours 10 minutes 0 seconds Total Procedure       |             
 |
| Duration: 0 hours 23 minutes 48 seconds Scope In: 10:43:53 AMScope          |             
 |
| Out: 11:07:41 AM       [facility], [address]            |             
 |
| [place]        [id number] [phone]                        |             
 |
|       - Await pathology results.                                            |             
 |
|       - Return to primary care physician as previously scheduled.           |             
 |
|       - Telephone GI clinic for pathology results in 1 week.                |             
 |
|       - Telephone GI clinic if symptomatic.                                 |             
 |
|Mahin Leiva MD                                                           |             
 |
|2017 11:28:47 AM                                                        |             
 |
 
|This report has been signed electronically.                                  |             
 |
|Number of Addenda: 0                                                         |             
 |
|Note Initiated On: 2017 10:10 AM                                        |             
 |
|Scope Withdrawal Time: 0 hours 10 minutes 0 seconds                          |             
 |
|Total Procedure Duration: 0 hours 23 minutes 48 seconds                      |             
 |
|Scope In: 10:43:53 AM                                                        |             
 |
|Scope Out: 11:07:41 AM                                                       |             
 |
|       [facility], [address], [place]  |             
 |
|       [id number] [phone]                                                    |             
 |
+-----------------------------------------------------------------------------+-------------
-+
 
 
 
+------------------+---------+--------------------+--------------+
| Performing       | Address | City/State/Zipcode | Phone Number |
| Organization     |         |                    |              |
+------------------+---------+--------------------+--------------+
|   [facility] |         |                    |              |
+------------------+---------+--------------------+--------------+
 Surgical Pathology Exam (2017 12:00 AM PDT)
 
+----------+
| Specimen |
+----------+
|          |
+----------+
 
 
 
+------------------------------------------------------------------------+-----------------+
| Narrative                                                              | Performed At    |
+------------------------------------------------------------------------+-----------------+
|   SPECIMEN(S): A DUODENAL BIOPSY  SPECIMEN(S): B ESOPHAGEAL BIOPSY     |   [facility]  |
| SPECIMEN(S): C RIGHT COLON BIOPSY  SPECIMEN(S): D LEFT COLON BIOPSY    | [facility]          |
|   SPECIMEN SOURCE:  A. DUODENAL BIOPSY  B. ESOPHAGEAL BIOPSY  C. RIGHT |                 |
|  COLON BIOPSY  D. LEFT COLON BIOPSY     CLINICAL HISTORY:  K21.9       |                 |
| (gastroesophageal reflux disease without esophagitis), R19.7           |                 |
| (diarrhea, unspecified), I48.91 (Unspecified atrial fibrillation),     |                 |
| Z86.010 (personal history of colonic polyps) R54 (Age-related          |                 |
| physical debility)     MICROSCOPIC DESCRIPTION:  Histologic sections   |                 |
| of all submitted blocks are examined by light microscopy. These        |                 |
| findings, together with the gross examination, support the pathologic  |                 |
| diagnosis.     FINAL PATHOLOGIC DIAGNOSIS:  A. Duodenal biopsy:  -     |                 |
|   Fragments of duodenal mucosa with preserved villous architecture.  - |                 |
|    No evidence of significantly increased intraepithelial lymphocytes. |                 |
|      B. Esophageal biopsy:  -   Fragments of gastric mucosa with foci  |                 |
| of mild chronic inflammation and reactive epithelial changes.  -   No  |                 |
| squamous mucosa identified as sampled.  -   Negative for intestinal    |                 |
| metaplasia as sampled.     C. Colon, right, biopsy:  -   Fragments of  |                 |
| colonic mucosa with no significant diagnostic abnormality.     D.      |                 |
 
| Colon, left, biopsy:  -   Fragments of colonic mucosa with no          |                 |
| significant diagnostic abnormality.     CLR:[facility]:C2NR     GROSS         |                 |
| DESCRIPTION:  Received in four parts.     A. Received in formalin      |                 |
| labeled "Susana Colbert" and "duodenal biopsy" on the requisition are    |                 |
| seven pink-tan tissue fragments measuring from 0.2-0.5 cm, submitted,  |                 |
| all into (A1).     B. Received in formalin labeled "Susana Colbert" and  |                 |
| "esophageal biopsy" on the requisition are six pink-tan tissue         |                 |
| fragment measuring from <0.1-0.55 cm, submitted, all in (B1).     C.   |                 |
| Received in formalin labeled "Susana Colbert" and "right colon biopsy"   |                 |
| on the requisition are six gray-tan tissue fragments measuring from    |                 |
| 0.3-0.45 cm, submitted, all into (C1).     D. Received in formalin     |                 |
| labeled "Susana Colbert" and "left colon biopsy" on the requisition are  |                 |
| eight pink-tan tissue fragments measuring from 0.1-0.3 cm, submitted,  |                 |
| all into (D1).  ka:JEN:navin     PERFORMING LABORATORY:  Tissue          |                 |
| processing and slide preparation were performed by [facility], |                 |
|  [address] [place] [address] [address], [place] [address] (Medical Director:  |                 |
| Stuart Sosa M.D. [facility]#: [id number]).  Professional interpretation  |                 |
| was performed by [facility], [facility]      |                 |
| [place], [address] [place] [address], [place] [address] (Medical         |                 |
| Director:   Marcos López [facility]#:  [id number]).                 |                 |
| Diagnostician:   Marcos López MD  Pathologist  Electronically |                 |
|  Signed 2017                                                     |                 |
+------------------------------------------------------------------------+-----------------+
 
 
 
+-----------------+---------+--------------------+--------------+
| Performing      | Address | City/State/Zipcode | Phone Number |
| Organization    |         |                    |              |
+-----------------+---------+--------------------+--------------+
|   [facility]  |         |                    |              |
| [facility]          |         |                    |              |
+-----------------+---------+--------------------+--------------+
 documented in this encounter
 
 Visit Diagnoses
 
 
+------------------------------------------------------------------------------------+
| Diagnosis                                                                          |
+------------------------------------------------------------------------------------+
|   Gastroesophageal reflux disease without esophagitis - Primary  Esophageal reflux |
+------------------------------------------------------------------------------------+
|   History of colon polyps  Personal history of colonic polyps                      |
+------------------------------------------------------------------------------------+
|   Change in bowel habit                                                            |
+------------------------------------------------------------------------------------+
 documented in this encounter
 
 Administered Medications
 
 
+-----------------------------------+---------+----------+------+-------+------+
| Medication Order                  | MAR     | Action   | Dose | Rate  | Site |
|                                   | Action  | Date     |      |       |      |
+-----------------------------------+---------+----------+------+-------+------+
|   ampicillin 2 g in sodium        | New Bag | 20 | 2 g  | 200   |      |
| chloride 0.9% 100 mL IVPB  2 g,   |         | 17  9:45 |      | mL/hr |      |
| Intravenous, Administer over 30   |         |  AM PDT  |      |       |      |
| Minutes, Prior to Incision,       |         |          |      |       |      |
 
| Starting 17 at 0937, For |         |          |      |       |      |
|  1 dose, TO BE GIVEN PRIOR TO     |         |          |      |       |      |
| PROCEDURE Activate system and mix |         |          |      |       |      |
|  before use., Pre-op,             |         |          |      |       |      |
| Indications: total knee           |         |          |      |       |      |
| replacement                       |         |          |      |       |      |
+-----------------------------------+---------+----------+------+-------+------+
 
 
 
+---+---+
|   |   |
+---+---+
 
 
 
+-----------------------------------+---------+----------+-------+-------+---+
|   gentamicin 60 mg in sodium      | New Bag | 20 | 60 mg | 103   |   |
| chloride 0.9% 50 mL IVPB  60 mg,  |         | 17  9:58 |       | mL/hr |   |
| Intravenous, Administer over 30   |         |  AM PDT  |       |       |   |
| Minutes, Prior to Incision,       |         |          |       |       |   |
| Starting Fri 17 at 0937, For |         |          |       |       |   |
|  1 dose, TO BE GIVEN PRIOR TO     |         |          |       |       |   |
| PROCEDURE, Pre-op, Indications:   |         |          |       |       |   |
| total knee replacement            |         |          |       |       |   |
+-----------------------------------+---------+----------+-------+-------+---+
 
 
 
+---------+----------+---+---+---+
| New Bag | 20 |   |   |   |
|         | 17  9:57 |   |   |   |
|         |  AM PDT  |   |   |   |
+---------+----------+---+---+---+
 
 
 
+---+---+
|   |   |
+---+---+
 
 
 
+-----------------------------------+---------+----------+---+---+---+
|   lactated ringers (LR) infusion  | New Bag | 20 |   |   |   |
|  at  mL/hr, Intravenous,    |         | 17  9:45 |   |   |   |
| CONTINUOUS, Starting Fri 17  |         |  AM PDT  |   |   |   |
| at 1000, TKO., Pre-op             |         |          |   |   |   |
+-----------------------------------+---------+----------+---+---+---+
 
 
 
+---------+----------+---+----------+---+
| New Bag | 20 |   | 50 mL/hr |   |
|         | 17  9:45 |   |          |   |
|         |  AM PDT  |   |          |   |
+---------+----------+---+----------+---+
 
 
 
 
+---+---+
|   |   |
+---+---+
 documented in this encounter

## 2020-09-21 NOTE — XMS
Encounter Summary
  Created on: 2020
 
 SayraSusana
 External Reference #: [id number]
 : 48
 Sex: Female
 
 Demographics
 
 
+-----------------------+---------------------------+
| Address               | [address]            |
|                       | [place]  [address] |
+-----------------------+---------------------------+
| Home Phone            | [phone]           |
+-----------------------+---------------------------+
| Preferred Language    | Unknown                   |
+-----------------------+---------------------------+
| Marital Status        |                    |
+-----------------------+---------------------------+
| Episcopal Affiliation | 1073                      |
+-----------------------+---------------------------+
| Race                  | White                     |
+-----------------------+---------------------------+
| Ethnic Group          | Not  or     |
+-----------------------+---------------------------+
 
 
 Author
 
 
+--------------+--------------------------------------------+
| Author       | [facility]  |
|              | and [place]                                |
+--------------+--------------------------------------------+
| Organization | [facility]  |
|              | and [place]                                |
+--------------+--------------------------------------------+
| Address      | Unknown                                    |
+--------------+--------------------------------------------+
| Phone        | Unavailable                                |
+--------------+--------------------------------------------+
 
 
 
 Support
 
 
+-------------+--------------+-------------------+-----------------+
| Name        | Relationship | Address           | Phone           |
+-------------+--------------+-------------------+-----------------+
| Eduar Colbert | ECON         | [address]       | [phone] |
|             |              | [place]   |                 |
|             |              | [address]             |                 |
+-------------+--------------+-------------------+-----------------+
 
 
 
 
 Care Team Providers
 
 
+-------------------------+------+-----------------+
| Care Team Member Name   | Role | Phone           |
+-------------------------+------+-----------------+
| Tobin Alex MD | PCP  | [phone] |
+-------------------------+------+-----------------+
 
 
 
 Encounter Details
 
 
+--------+----------+----------------------+----------------------+-------------+
| Date   | Type     | Department           | Care Team            | Description |
+--------+----------+----------------------+----------------------+-------------+
| / | Abstract |   [facility]          |   Mahin Leiva MD |             |
|    |          | GASTROENTEROLOGY     |   [address]  |             |
|        |          | [address]  | [address]  [place], [place] |             |
|        |          | [address]  [place], [place] |  [address]  [phone] |             |
|        |          |  [address]          |   [phone] (Fax) |             |
|        |          | [phone]         |                      |             |
+--------+----------+----------------------+----------------------+-------------+
 
 
 
 Social History
 
 
+---------------+------------+-----------+--------+------------------+
| Tobacco Use   | Types      | Packs/Day | Years  | Date             |
|               |            |           | Used   |                  |
+---------------+------------+-----------+--------+------------------+
| Former Smoker | Cigarettes | 1.5       | 5      | Quit: 1979 |
+---------------+------------+-----------+--------+------------------+
 
 
 
+---------------------+---+---+---+
| Smokeless Tobacco:  |   |   |   |
| Never Used          |   |   |   |
+---------------------+---+---+---+
 
 
 
+-------------+----------------------+---------+------------------+
| Alcohol Use | Drinks/Week          | oz/Week | Comments         |
+-------------+----------------------+---------+------------------+
| Yes         |   1 Shots of liquor  | 1.0     | "social drinker" |
|             |  0 Standard drinks   |         |                  |
|             | or equivalent        |         |                  |
+-------------+----------------------+---------+------------------+
 
 
 
+------------------+---------------+
| Sex Assigned at  | Date Recorded |
| Birth            |               |
 
+------------------+---------------+
| Not on file      |               |
+------------------+---------------+
 documented as of this encounter
 
 Last Filed Vital Signs
 
 
+-------------------+----------------------+----------------------+----------+
| Vital Sign        | Reading              | Time Taken           | Comments |
+-------------------+----------------------+----------------------+----------+
| Blood Pressure    | -                    | -                    |          |
+-------------------+----------------------+----------------------+----------+
| Pulse             | -                    | -                    |          |
+-------------------+----------------------+----------------------+----------+
| Temperature       | -                    | -                    |          |
+-------------------+----------------------+----------------------+----------+
| Respiratory Rate  | -                    | -                    |          |
+-------------------+----------------------+----------------------+----------+
| Oxygen Saturation | -                    | -                    |          |
+-------------------+----------------------+----------------------+----------+
| Inhaled Oxygen    | -                    | -                    |          |
| Concentration     |                      |                      |          |
+-------------------+----------------------+----------------------+----------+
| Weight            | 62.1 kg (136 lb 12.8 | 2017  8:28 AM  |          |
|                   |  oz)                 | PDT                  |          |
+-------------------+----------------------+----------------------+----------+
| Height            | 170.2 cm (5' 7")     | 2017  8:28 AM  |          |
|                   |                      | PDT                  |          |
+-------------------+----------------------+----------------------+----------+
| Body Mass Index   | 21.43                | 2017  8:28 AM  |          |
|                   |                      | PDT                  |          |
+-------------------+----------------------+----------------------+----------+
 documented in this encounter
 
 Plan of Treatment
 
 
+--------+---------+-----------+----------------------+-------------+
| Date   | Type    | Specialty | Care Team            | Description |
+--------+---------+-----------+----------------------+-------------+
| 10/12/ | Office  | Neurology |   Jamin Brooks MD  [address] |             |
|    | Visit   |           |  [address]      |             |
|        |         |           | [address]  [place],  |             |
|        |         |           | [place] [address]             |             |
|        |         |           | [phone]         |             |
|        |         |           | [phone] (Fax)   |             |
+--------+---------+-----------+----------------------+-------------+
 documented as of this encounter
 
 Procedures
 
 
+----------------------+--------+------------+----------------------+----------------------+
| Procedure Name       | Priori | Date/Time  | Associated Diagnosis | Comments             |
|                      | ty     |            |                      |                      |
+----------------------+--------+------------+----------------------+----------------------+
| HC GIARDIA ANTIGEN   | Routin | 2017 |                      |   Results for this   |
|                      | e      |            |                      | procedure are in the |
|                      |        |            |                      |  results section.    |
 
+----------------------+--------+------------+----------------------+----------------------+
| C. DIFFICILE TOXIN,  | Routin | 2017 |                      |   Results for this   |
| ANTIBODY             | e      |            |                      | procedure are in the |
|                      |        |            |                      |  results section.    |
+----------------------+--------+------------+----------------------+----------------------+
| GIARDIA ANTIGEN, IF, | Routin | 2017 |                      |   Results for this   |
|  STOOL               | e      |            |                      | procedure are in the |
|                      |        |            |                      |  results section.    |
+----------------------+--------+------------+----------------------+----------------------+
| OVA AND PARASITE     | Routin | 2017 |                      |   Results for this   |
| EXAMINATION          | e      |            |                      | procedure are in the |
|                      |        |            |                      |  results section.    |
+----------------------+--------+------------+----------------------+----------------------+
| FECAL LEUKOCYTES     | ASAP   | 2017 |                      |   Results for this   |
|                      |        |            |                      | procedure are in the |
|                      |        |            |                      |  results section.    |
+----------------------+--------+------------+----------------------+----------------------+
| CULTURE, STOOL       | Routin | 2017 |                      |   Results for this   |
|                      | e      |            |                      | procedure are in the |
|                      |        |            |                      |  results section.    |
+----------------------+--------+------------+----------------------+----------------------+
 documented in this encounter
 
 Results
 HC GIARDIA ANTIGEN (2017)
 
+----------+
| Specimen |
+----------+
|          |
+----------+
 
 
 
+--------------------+--------------+
| Impressions        | Performed At |
+--------------------+--------------+
|   Ordered in error |              |
+--------------------+--------------+
 Fecal leukocytes (2017)
 
+-----------+----------------------+-----------+------------+--------------+
| Component | Value                | Ref Range | Performed  | Pathologist  |
|           |                      |           | At         | Signature    |
+-----------+----------------------+-----------+------------+--------------+
| WBC Stool | 2017 No White  |           |            |              |
|           | Blood Cells Seen.    |           |            |              |
+-----------+----------------------+-----------+------------+--------------+
 
 
 
+---------------------+
| Specimen            |
+---------------------+
| Stool - Stool       |
| specimen (specimen) |
+---------------------+
 Culture, Stool (2017)
 
+-----------+--------------------------+-----------+------------+--------------+
 
| Component | Value                    | Ref Range | Performed  | Pathologist  |
|           |                          |           | At         | Signature    |
+-----------+--------------------------+-----------+------------+--------------+
| Result    | 2017 no growth of  |           |            |              |
|           | normal enteric           |           |            |              |
|           | gram-negative bacilli    |           |            |              |
|           | after overnight          |           |            |              |
|           | incubation.              |           |            |              |
+-----------+--------------------------+-----------+------------+--------------+
| Result    | 2017 no growth of  |           |            |              |
|           | normal enteric           |           |            |              |
|           | gram-negative bacilli    |           |            |              |
|           | after 2 days incubation  |           |            |              |
+-----------+--------------------------+-----------+------------+--------------+
| Result    | 2017 No martinez in   |           |            |              |
|           | growth. No Salmonella,   |           |            |              |
|           | Shigella, Escherichia    |           |            |              |
|           | coli 0157,               |           |            |              |
|           | Campylobacter, or        |           |            |              |
|           | Yersinia isolated.  Not  |           |            |              |
|           | specifically tested for  |           |            |              |
|           | other enteric pathogens  |           |            |              |
+-----------+--------------------------+-----------+------------+--------------+
 
 
 
+---------------------+
| Specimen            |
+---------------------+
| Stool - Stool       |
| specimen (specimen) |
+---------------------+
 Ova and Parasite Examination (2017)
 
+-----------+-----------------------+-----------+------------+--------------+
| Component | Value                 | Ref Range | Performed  | Pathologist  |
|           |                       |           | At         | Signature    |
+-----------+-----------------------+-----------+------------+--------------+
| Ova +     | No ova and parasites  |           |            |              |
| Parasite  | seen                  |           |            |              |
| Exam      |                       |           |            |              |
+-----------+-----------------------+-----------+------------+--------------+
 
 
 
+---------------------+
| Specimen            |
+---------------------+
| Stool - Stool       |
| specimen (specimen) |
+---------------------+
 Giardia Ag, IF, Stool (2017)
 
+-----------+----------+-----------+------------+--------------+
| Component | Value    | Ref Range | Performed  | Pathologist  |
|           |          |           | At         | Signature    |
+-----------+----------+-----------+------------+--------------+
| Giardia   | Negative | Negative  |            |              |
| Antigen,  |          |           |            |              |
| Stool     |          |           |            |              |
 
+-----------+----------+-----------+------------+--------------+
 
 
 
+---------------------+
| Specimen            |
+---------------------+
| Stool - Stool       |
| specimen (specimen) |
+---------------------+
 C. Difficile Toxin, Antibody (2017)
 
+-------------+--------------------------+-----------+------------+--------------+
| Component   | Value                    | Ref Range | Performed  | Pathologist  |
|             |                          |           | At         | Signature    |
+-------------+--------------------------+-----------+------------+--------------+
| C DIFFICILE | Negative for             |           |            |              |
|  ANTIGEN    | toxin-producing          |           |            |              |
|             | Clostridium difficule    |           |            |              |
|             | and presumptive negative |           |            |              |
|             |  for the gene marker of  |           |            |              |
|             | 027/NAP1/BI strain.      |           |            |              |
+-------------+--------------------------+-----------+------------+--------------+
 
 
 
+----------+
| Specimen |
+----------+
| Blood    |
+----------+
 documented in this encounter
 
 Visit Diagnoses
 Not on filedocumented in this encounter

## 2020-09-21 NOTE — XMS
Encounter Summary
  Created on: 2020
 
 SayraSusana
 External Reference #: [id number]
 : 48
 Sex: Female
 
 Demographics
 
 
+-----------------------+---------------------------+
| Address               | [address]            |
|                       | [place]  [address] |
+-----------------------+---------------------------+
| Home Phone            | [phone]           |
+-----------------------+---------------------------+
| Preferred Language    | Unknown                   |
+-----------------------+---------------------------+
| Marital Status        |                    |
+-----------------------+---------------------------+
| Mor[employer] Affiliation | 1073                      |
+-----------------------+---------------------------+
| Race                  | White                     |
+-----------------------+---------------------------+
| Ethnic Group          | Not  or     |
+-----------------------+---------------------------+
 
 
 Author
 
 
+--------------+--------------------------------------------+
| Author       | [facility]  |
|              | and [place]                                |
+--------------+--------------------------------------------+
| Organization | [facility]  |
|              | and [place]                                |
+--------------+--------------------------------------------+
| Address      | Unknown                                    |
+--------------+--------------------------------------------+
| Phone        | Unavailable                                |
+--------------+--------------------------------------------+
 
 
 
 Support
 
 
+-------------+--------------+-------------------+-----------------+
| Name        | Relationship | Address           | Phone           |
+-------------+--------------+-------------------+-----------------+
| Eduar Colbert | ECON         | [address]       | [phone] |
|             |              | [place]   |                 |
|             |              | [address]             |                 |
+-------------+--------------+-------------------+-----------------+
 
 
 
 
 Care Team Providers
 
 
+------------------------+------+-----------------+
| Care Team Member Name  | Role | Phone           |
+------------------------+------+-----------------+
| Álvaro Cisse MD | PCP  | [phone] |
+------------------------+------+-----------------+
 
 
 
 Reason for Visit
 
 
+-------------------+----------+
| Reason            | Comments |
+-------------------+----------+
| Medication Refill |          |
+-------------------+----------+
 
 
 
 Encounter Details
 
 
+--------+--------+---------------------+----------------------+-------------------+
| Date   | Type   | Department          | Care Team            | Description       |
+--------+--------+---------------------+----------------------+-------------------+
| / | Refill |   [facility] [place]         |   Pablito Nava,   | Medication Refill |
|    |        | PHYSIATRY  [address]    | MD  [address]  |                   |
|        |        | [address]   |  [place], [place]     |                   |
|        |        | [place]     | [address]  [phone]  |                   |
|        |        | [address]          |  [phone] (Fax)  |                   |
|        |        | [phone]        |                      |                   |
+--------+--------+---------------------+----------------------+-------------------+
 
 
 
 Social History
 
 
+---------------+-------+-----------+--------+------+
| Tobacco Use   | Types | Packs/Day | Years  | Date |
|               |       |           | Used   |      |
+---------------+-------+-----------+--------+------+
| Former Smoker |       |           |        |      |
+---------------+-------+-----------+--------+------+
 
 
 
+------------------------+
| Comments: 10 years ago |
+------------------------+
 
 
 
+-------------+-------------+---------+----------+
| Alcohol Use | Drinks/Week | oz/Week | Comments |
+-------------+-------------+---------+----------+
 
| Not Asked   |             |         |          |
+-------------+-------------+---------+----------+
 
 
 
+------------------+---------------+
| Sex Assigned at  | Date Recorded |
| Birth            |               |
+------------------+---------------+
| Not on file      |               |
+------------------+---------------+
 documented as of this encounter
 
 Plan of Treatment
 
 
+--------+---------+-----------+----------------------+-------------+
| Date   | Type    | Specialty | Care Team            | Description |
+--------+---------+-----------+----------------------+-------------+
| 10/12/ | Office  | Neurology |   Jamin Brooks MD  [address] |             |
| 2020   | Visit   |           |  [address]      |             |
|        |         |           | [place]  |             |
|        |         |           | [place] [address]             |             |
|        |         |           | [phone]         |             |
|        |         |           | [phone] (Fax)   |             |
+--------+---------+-----------+----------------------+-------------+
 documented as of this encounter
 
 Visit Diagnoses
 Not on filedocumented in this encounter

## 2020-09-21 NOTE — XMS
Encounter Summary
  Created on: 2020
 
 SayraSusana
 External Reference #: [id number]
 : 48
 Sex: Female
 
 Demographics
 
 
+-----------------------+---------------------------+
| Address               | [address]            |
|                       | [place]  [address] |
+-----------------------+---------------------------+
| Home Phone            | [phone]           |
+-----------------------+---------------------------+
| Preferred Language    | Unknown                   |
+-----------------------+---------------------------+
| Marital Status        |                    |
+-----------------------+---------------------------+
| Gnosticism Affiliation | [id number]                      |
+-----------------------+---------------------------+
| Race                  | White                     |
+-----------------------+---------------------------+
| Ethnic Group          | Not  or     |
+-----------------------+---------------------------+
 
 
 Author
 
 
+--------------+--------------------------------------------+
| Author       | [facility]  |
|              | [facility] [place]                                |
+--------------+--------------------------------------------+
| Organization | [facility]  |
|              | [facility] [place]                                |
+--------------+--------------------------------------------+
| Address      | Unknown                                    |
+--------------+--------------------------------------------+
| Phone        | Unavailable                                |
+--------------+--------------------------------------------+
 
 
 
 Support
 
 
+-------------+--------------+-------------------+-----------------+
| Name        | Relationship | Address           | Phone           |
+-------------+--------------+-------------------+-----------------+
| Eduar Colbert | ECON         | [address]       | [phone] |
|             |              | [place]   |                 |
|             |              | [address]             |                 |
+-------------+--------------+-------------------+-----------------+
 
 
 
 
 Care Team Providers
 
 
+-------------------------+------+-----------------+
| Care Team Member Name   | Role | Phone           |
+-------------------------+------+-----------------+
| Tobin Alex MD | PCP  | [phone] |
+-------------------------+------+-----------------+
 
 
 
 Encounter Details
 
 
+--------+----------+----------------------+----------------------+-------------+
| Date   | Type     | Department           | Care Team            | Description |
+--------+----------+----------------------+----------------------+-------------+
| / | Abstract |   [facility]          |   Mahin Leiva MD |             |
|    |          | GASTROENTEROLOGY     |   [address]  |             |
|        |          | [address]  | [address]  [place], [place] |             |
|        |          | [address]  [place], [place] |  [address]  [phone] |             |
|        |          |  [address]          |   [phone] (Fax) |             |
|        |          | [phone]         |                      |             |
+--------+----------+----------------------+----------------------+-------------+
 
 
 
 Social History
 
 
+---------------+-------+-----------+--------+------+
| Tobacco Use   | Types | Packs/Day | Years  | Date |
|               |       |           | Used   |      |
+---------------+-------+-----------+--------+------+
| Former Smoker |       |           |        |      |
+---------------+-------+-----------+--------+------+
 
 
 
+---------------------+---+---+---+
| Smokeless Tobacco:  |   |   |   |
| Never Used          |   |   |   |
+---------------------+---+---+---+
 
 
 
+------------------------+
| Comments: 10 years ago |
+------------------------+
 
 
 
+-------------+----------------------+---------+------------------+
| Alcohol Use | Drinks/Week          | oz/Week | Comments         |
+-------------+----------------------+---------+------------------+
| Yes         |   0 Standard drinks  | 0.0     | "social drinker" |
|             | or equivalent        |         |                  |
+-------------+----------------------+---------+------------------+
 
 
 
 
+------------------+---------------+
| Sex Assigned at  | Date Recorded |
| Birth            |               |
+------------------+---------------+
| Not on file      |               |
+------------------+---------------+
 documented as of this encounter
 
 Plan of Treatment
 
 
+--------+---------+-----------+----------------------+-------------+
| Date   | Type    | Specialty | Care Team            | Description |
+--------+---------+-----------+----------------------+-------------+
| 10/12/ | Office  | Neurology |   Jamin Brooks MD  [address] |             |
| 2020   | Visit   |           |  [facility]      |             |
|        |         |           | [place]  |             |
|        |         |           | [place] [address]             |             |
|        |         |           | [phone]         |             |
|        |         |           | [phone] (Fax)   |             |
+--------+---------+-----------+----------------------+-------------+
 documented as of this encounter
 
 Procedures
 
 
+----------------------+--------+------------+----------------------+----------------------+
| Procedure Name       | Priori | Date/Time  | Associated Diagnosis | Comments             |
|                      | ty     |            |                      |                      |
+----------------------+--------+------------+----------------------+----------------------+
| EXTERNAL LAB: [facility]    | Routin | 2015 |                      |   Results for this   |
|                      | e      |            |                      | procedure are in the |
|                      |        |            |                      |  results section.    |
+----------------------+--------+------------+----------------------+----------------------+
| EXTERNAL LAB:        | Routin | 2015 |                      |   Results for this   |
| GLUCOSE              | e      |            |                      | procedure are in the |
|                      |        |            |                      |  results section.    |
+----------------------+--------+------------+----------------------+----------------------+
| EXTERNAL LAB:        | Routin | 2015 |                      |   Results for this   |
| CALCIUM              | e      |            |                      | procedure are in the |
|                      |        |            |                      |  results section.    |
+----------------------+--------+------------+----------------------+----------------------+
| EXTERNAL LAB: CARBON | Routin | 2015 |                      |   Results for this   |
|  DIOXIDE             | e      |            |                      | procedure are in the |
|                      |        |            |                      |  results section.    |
+----------------------+--------+------------+----------------------+----------------------+
| EXTERNAL LAB:        | Routin | 2015 |                      |   Results for this   |
| CHLORIDE             | e      |            |                      | procedure are in the |
|                      |        |            |                      |  results section.    |
+----------------------+--------+------------+----------------------+----------------------+
| EXTERNAL LAB:        | Routin | 2015 |                      |   Results for this   |
| POTASSIUM            | e      |            |                      | procedure are in the |
|                      |        |            |                      |  results section.    |
+----------------------+--------+------------+----------------------+----------------------+
| EXTERNAL LAB: SODIUM | Routin | 2015 |                      |   Results for this   |
|                      | e      |            |                      | procedure are in the |
|                      |        |            |                      |  results section.    |
+----------------------+--------+------------+----------------------+----------------------+
 
| EXTERNAL LAB: CBC    | Routin | 2015 |                      |   Results for this   |
|                      | e      |            |                      | procedure are in the |
|                      |        |            |                      |  results section.    |
+----------------------+--------+------------+----------------------+----------------------+
| EXTERNAL LAB:        | Routin | 2015 |                      |   Results for this   |
| CREATININE           | e      |            |                      | procedure are in the |
|                      |        |            |                      |  results section.    |
+----------------------+--------+------------+----------------------+----------------------+
| CBC WITH             | Routin | 2015 |                      |   Results for this   |
| DIFFERENTIAL         | e      |            |                      | procedure are in the |
|                      |        |            |                      |  results section.    |
+----------------------+--------+------------+----------------------+----------------------+
 documented in this encounter
 
 Results
 External Lab: [facility] (2015)
 
+-----------+-------+-----------+------------+--------------+
| Component | Value | Ref Range | Performed  | Pathologist  |
|           |       |           | At         | Signature    |
+-----------+-------+-----------+------------+--------------+
| [facility],      | 12    | 8 - 23    | EXTERNAL   |              |
| External  |       |           | LAB        |              |
+-----------+-------+-----------+------------+--------------+
 
 
 
+----------------+---------+--------------------+--------------+
| Performing     | Address | City/State/Zipcode | Phone Number |
| Organization   |         |                    |              |
+----------------+---------+--------------------+--------------+
|   EXTERNAL LAB |         |                    |              |
+----------------+---------+--------------------+--------------+
 External Lab: Glucose (2015)
 
+-----------+---------+-----------+------------+--------------+
| Component | Value   | Ref Range | Performed  | Pathologist  |
|           |         |           | At         | Signature    |
+-----------+---------+-----------+------------+--------------+
| Glucose,  | 143 (A) | 82 - 115  | EXTERNAL   |              |
| External  |         |           | LAB        |              |
+-----------+---------+-----------+------------+--------------+
 
 
 
+----------------+---------+--------------------+--------------+
| Performing     | Address | City/State/Zipcode | Phone Number |
| Organization   |         |                    |              |
+----------------+---------+--------------------+--------------+
|   EXTERNAL LAB |         |                    |              |
+----------------+---------+--------------------+--------------+
 External Lab: Calcium (2015)
 
+-----------+-------+------------+------------+--------------+
| Component | Value | Ref Range  | Performed  | Pathologist  |
|           |       |            | At         | Signature    |
+-----------+-------+------------+------------+--------------+
| Calcium,  | 8.8   | 8.6 - 10.3 | EXTERNAL   |              |
| External  |       |            | LAB        |              |
+-----------+-------+------------+------------+--------------+
 
 
 
 
+----------------+---------+--------------------+--------------+
| Performing     | Address | City/State/Zipcode | Phone Number |
| Organization   |         |                    |              |
+----------------+---------+--------------------+--------------+
|   EXTERNAL LAB |         |                    |              |
+----------------+---------+--------------------+--------------+
 External Lab: Carbon Dioxide (2015)
 
+-----------+-------+-----------+------------+--------------+
| Component | Value | Ref Range | Performed  | Pathologist  |
|           |       |           | At         | Signature    |
+-----------+-------+-----------+------------+--------------+
| Carbon    | 28    | 23 - 31   | EXTERNAL   |              |
| Dioxide,  |       |           | LAB        |              |
| External  |       |           |            |              |
+-----------+-------+-----------+------------+--------------+
 
 
 
+----------------+---------+--------------------+--------------+
| Performing     | Address | City/State/Zipcode | Phone Number |
| Organization   |         |                    |              |
+----------------+---------+--------------------+--------------+
|   EXTERNAL LAB |         |                    |              |
+----------------+---------+--------------------+--------------+
 External Lab: Chloride (2015)
 
+------------+-------+-----------+------------+--------------+
| Component  | Value | Ref Range | Performed  | Pathologist  |
|            |       |           | At         | Signature    |
+------------+-------+-----------+------------+--------------+
| Chloride,  | 107   | 98 - 107  | EXTERNAL   |              |
| External   |       |           | LAB        |              |
+------------+-------+-----------+------------+--------------+
 
 
 
+----------------+---------+--------------------+--------------+
| Performing     | Address | City/State/Zipcode | Phone Number |
| Organization   |         |                    |              |
+----------------+---------+--------------------+--------------+
|   EXTERNAL LAB |         |                    |              |
+----------------+---------+--------------------+--------------+
 External Lab: Potassium (2015)
 
+-------------+-------+-----------+------------+--------------+
| Component   | Value | Ref Range | Performed  | Pathologist  |
|             |       |           | At         | Signature    |
+-------------+-------+-----------+------------+--------------+
| Potassium,  | 3.8   | 3.6 - 5   | EXTERNAL   |              |
| External    |       |           | LAB        |              |
+-------------+-------+-----------+------------+--------------+
 
 
 
+----------------+---------+--------------------+--------------+
| Performing     | Address | City/State/Zipcode | Phone Number |
 
| Organization   |         |                    |              |
+----------------+---------+--------------------+--------------+
|   EXTERNAL LAB |         |                    |              |
+----------------+---------+--------------------+--------------+
 External Lab: Sodium (2015)
 
+-----------+-------+-----------+------------+--------------+
| Component | Value | Ref Range | Performed  | Pathologist  |
|           |       |           | At         | Signature    |
+-----------+-------+-----------+------------+--------------+
| Sodium,   | 139   | 136 - 145 | EXTERNAL   |              |
| External  |       |           | LAB        |              |
+-----------+-------+-----------+------------+--------------+
 
 
 
+----------------+---------+--------------------+--------------+
| Performing     | Address | City/State/Zipcode | Phone Number |
| Organization   |         |                    |              |
+----------------+---------+--------------------+--------------+
|   EXTERNAL LAB |         |                    |              |
+----------------+---------+--------------------+--------------+
 External Lab: Creatinine (2015)
 
+-------------+-------+-----------+------------+--------------+
| Component   | Value | Ref Range | Performed  | Pathologist  |
|             |       |           | At         | Signature    |
+-------------+-------+-----------+------------+--------------+
| Creatinine, | 0.83  | 0.6 - 1.1 | EXTERNAL   |              |
|  External   |       |           | LAB        |              |
+-------------+-------+-----------+------------+--------------+
 
 
 
+-----------------+
| Specimen        |
+-----------------+
| Blood specimen  |
| (specimen)      |
+-----------------+
 
 
 
+----------------+---------+--------------------+--------------+
| Performing     | Address | City/State/Zipcode | Phone Number |
| Organization   |         |                    |              |
+----------------+---------+--------------------+--------------+
|   EXTERNAL LAB |         |                    |              |
+----------------+---------+--------------------+--------------+
 CBC with Differential (2015)
 
+-------------+-------+----------------+------------+--------------+
| Component   | Value | Ref Range      | Performed  | Pathologist  |
|             |       |                | At         | Signature    |
+-------------+-------+----------------+------------+--------------+
| MCH         | 30.6  | 26.0 - 33.0 pg |            |              |
+-------------+-------+----------------+------------+--------------+
| MCHC        | 32.7  | 30.0 - 36.0 %  |            |              |
+-------------+-------+----------------+------------+--------------+
| Basophils % | 0.2   | 0 - 3          |            |              |
 
+-------------+-------+----------------+------------+--------------+
 
 
 
+-----------------+
| Specimen        |
+-----------------+
| Blood specimen  |
| (specimen)      |
+-----------------+
 External Lab: CBC (2015)
 
+-------------+-------+------------+------------+--------------+
| Component   | Value | Ref Range  | Performed  | Pathologist  |
|             |       |            | At         | Signature    |
+-------------+-------+------------+------------+--------------+
| WBC,        | 9.4   | 4 - 10     | EXTERNAL   |              |
| External    |       |            | LAB        |              |
+-------------+-------+------------+------------+--------------+
| HGB,        | 12.1  | 11.5 - 16  | EXTERNAL   |              |
| External    |       |            | LAB        |              |
+-------------+-------+------------+------------+--------------+
| HCT,        | 37    | 34.7 - 46  | EXTERNAL   |              |
| External    |       |            | LAB        |              |
+-------------+-------+------------+------------+--------------+
| PLT,        | 191   | 140 - 440  | EXTERNAL   |              |
| External    |       |            | LAB        |              |
+-------------+-------+------------+------------+--------------+
| Neutrophils | 73.5  | 37 - 80    | EXTERNAL   |              |
|   %,        |       |            | LAB        |              |
| External    |       |            |            |              |
+-------------+-------+------------+------------+--------------+
| Lymphocytes | 20    | 16 - 51    | EXTERNAL   |              |
|  %,         |       |            | LAB        |              |
| External    |       |            |            |              |
+-------------+-------+------------+------------+--------------+
| Monocytes   | 6.2   | 0 - 12     | EXTERNAL   |              |
| %, External |       |            | LAB        |              |
+-------------+-------+------------+------------+--------------+
| Eosinophils | 0.1   | 0 - 8      | EXTERNAL   |              |
|  %,         |       |            | LAB        |              |
| External    |       |            |            |              |
+-------------+-------+------------+------------+--------------+
| Neutrophils | 6.87  | 1.5 - 8    | EXTERNAL   |              |
| , Absolute, |       |            | LAB        |              |
|  External   |       |            |            |              |
+-------------+-------+------------+------------+--------------+
| Lymphocytes | 1.87  | 0.8 - 4    | EXTERNAL   |              |
| , Absolute, |       |            | LAB        |              |
|  External   |       |            |            |              |
+-------------+-------+------------+------------+--------------+
| Monocytes,  | 0.58  | 0 - 1.2    | EXTERNAL   |              |
| Absolute,   |       |            | LAB        |              |
| External    |       |            |            |              |
+-------------+-------+------------+------------+--------------+
| Eosinophils | 0.01  | 0 - 0.3    | EXTERNAL   |              |
| , Absolute  |       |            | LAB        |              |
+-------------+-------+------------+------------+--------------+
| Basophils,  | 0.02  | 0 - 0.3    | EXTERNAL   |              |
| Absolute    |       |            | LAB        |              |
 
+-------------+-------+------------+------------+--------------+
| RBC,        | 3.96  | 3.85 - 5.2 | EXTERNAL   |              |
| External    |       |            | LAB        |              |
+-------------+-------+------------+------------+--------------+
| MCV,        | 93    | 80 - 97    | EXTERNAL   |              |
| External    |       |            | LAB        |              |
+-------------+-------+------------+------------+--------------+
| RDW,        | 12.8  | 11.5 - 15  | EXTERNAL   |              |
| External    |       |            | LAB        |              |
+-------------+-------+------------+------------+--------------+
 
 
 
+----------------+---------+--------------------+--------------+
| Performing     | Address | City/State/Zipcode | Phone Number |
| Organization   |         |                    |              |
+----------------+---------+--------------------+--------------+
|   EXTERNAL LAB |         |                    |              |
+----------------+---------+--------------------+--------------+
 documented in this encounter
 
 Visit Diagnoses
 Not on filedocumented in this encounter

## 2020-09-21 NOTE — XMS
Encounter Summary
  Created on: 2020
 
 SayraSusana
 External Reference #: [id number]
 : 48
 Sex: Female
 
 Demographics
 
 
+-----------------------+---------------------------+
| Address               | [address]            |
|                       | [place]  [address] |
+-----------------------+---------------------------+
| Home Phone            | [phone]           |
+-----------------------+---------------------------+
| Preferred Language    | Unknown                   |
+-----------------------+---------------------------+
| Marital Status        |                    |
+-----------------------+---------------------------+
| Yazdanism Affiliation | [id number]                      |
+-----------------------+---------------------------+
| Race                  | White                     |
+-----------------------+---------------------------+
| Ethnic Group          | Not  or     |
+-----------------------+---------------------------+
 
 
 Author
 
 
+--------------+--------------------------------------------+
| Author       | [facility]  |
|              | and [place]                                |
+--------------+--------------------------------------------+
| Organization | [facility]  |
|              | and [place]                                |
+--------------+--------------------------------------------+
| Address      | Unknown                                    |
+--------------+--------------------------------------------+
| Phone        | Unavailable                                |
+--------------+--------------------------------------------+
 
 
 
 Support
 
 
+-------------+--------------+-------------------+-----------------+
| Name        | Relationship | Address           | Phone           |
+-------------+--------------+-------------------+-----------------+
| Eduar Colbert | ECON         | [address]       | [phone] |
|             |              | [place]   |                 |
|             |              | [address]             |                 |
+-------------+--------------+-------------------+-----------------+
 
 
 
 
 Care Team Providers
 
 
+-----------------------+------+-------------+
| Care Team Member Name | Role | Phone       |
+-----------------------+------+-------------+
 PCP  | Unavailable |
+-----------------------+------+-------------+
 
 
 
 Encounter Details
 
 
+--------+-----------+----------------------+-----------+-------------+
| Date   | Type      | Department           | Care Team | Description |
+--------+-----------+----------------------+-----------+-------------+
| / | Hospital  |   [facility] |           |             |
|    | Encounter |  MED CTR XRAY  [address] |           |             |
|        |           |  [address]  [place]       |           |             |
|        |           | [place] [address] |           |             |
|        |           |   [phone]       |           |             |
+--------+-----------+----------------------+-----------+-------------+
 
 
 
 Social History
 
 
+----------------+-------+-----------+--------+------+
| Tobacco Use    | Types | Packs/Day | Years  | Date |
|                |       |           | Used   |      |
+----------------+-------+-----------+--------+------+
| Never Assessed |       |           |        |      |
+----------------+-------+-----------+--------+------+
 
 
 
+------------------+---------------+
| Sex Assigned at  | Date Recorded |
| Birth            |               |
+------------------+---------------+
| Not on file      |               |
+------------------+---------------+
 documented as of this encounter
 
 Plan of Treatment
 
 
+--------+---------+-----------+----------------------+-------------+
| Date   | Type    | Specialty | Care Team            | Description |
+--------+---------+-----------+----------------------+-------------+
| 10/12/ | Office  | Neurology |   Jamin Brooks MD  [address] |             |
| 2020   | Visit   |           |  [address]      |             |
|        |         |           | [place]  |             |
|        |         |           | [place] [address]             |             |
|        |         |           | [phone]         |             |
|        |         |           | [phone] (Fax)   |             |
+--------+---------+-----------+----------------------+-------------+
 
 documented as of this encounter
 
 Visit Diagnoses
 Not on filedocumented in this encounter

## 2020-09-21 NOTE — XMS
Encounter Summary
  Created on: 2020
 
 SayraSusana
 External Reference #: [id number]
 : 48
 Sex: Female
 
 Demographics
 
 
+-----------------------+---------------------------+
| Address               | [address]            |
|                       | [place]  [address] |
+-----------------------+---------------------------+
| Home Phone            | [phone]           |
+-----------------------+---------------------------+
| Preferred Language    | Unknown                   |
+-----------------------+---------------------------+
| Marital Status        |                    |
+-----------------------+---------------------------+
| Sikh Affiliation | 1073                      |
+-----------------------+---------------------------+
| Race                  | White                     |
+-----------------------+---------------------------+
| Ethnic Group          | Not  or     |
+-----------------------+---------------------------+
 
 
 Author
 
 
+--------------+--------------------------------------------+
| Author       | [facility]  |
|              | and [place]                                |
+--------------+--------------------------------------------+
| Organization | [facility]  |
|              | and [place]                                |
+--------------+--------------------------------------------+
| Address      | Unknown                                    |
+--------------+--------------------------------------------+
| Phone        | Unavailable                                |
+--------------+--------------------------------------------+
 
 
 
 Support
 
 
+-------------+--------------+-------------------+-----------------+
| Name        | Relationship | Address           | Phone           |
+-------------+--------------+-------------------+-----------------+
| Eduar Colbert | ECON         | [address]       | [phone] |
|             |              | [place]   |                 |
|             |              | [address]             |                 |
+-------------+--------------+-------------------+-----------------+
 
 
 
 
 Care Team Providers
 
 
+-------------------------+------+-----------------+
| Care Team Member Name   | Role | Phone           |
+-------------------------+------+-----------------+
| Tobin Alex MD | PCP  | [phone] |
+-------------------------+------+-----------------+
 
 
 
 Reason for Visit
 
 
+--------------+--------+-------------------------+
| Reason       | Onset  | Comments                |
|              | Date   |                         |
+--------------+--------+-------------------------+
| Imaging Only | / | mr enterography  |
|              |    |                         |
+--------------+--------+-------------------------+
 
 
 
 Encounter Details
 
 
+--------+-----------+----------------------+----------------------+---------------------+
| Date   | Type      | Department           | Care Team            | Description         |
+--------+-----------+----------------------+----------------------+---------------------+
| / | Telephone |   [facility]          |   Mahin Leiva MD | Imaging Only (mr    |
| 2018   |           | GASTROENTEROLOGY     |   [address]  | enterography March  |
|        |           | [address]  | [address]  [place], [place] | 2)                  |
|        |           | [address]  [place], [place] |  [id number] [phone] |                     |
|        |           |  [address]          |   [phone] (Fax) |                     |
|        |           | [phone]         |                      |                     |
+--------+-----------+----------------------+----------------------+---------------------+
 
 
 
 Social History
 
 
+---------------+------------+-----------+--------+------------------+
| Tobacco Use   | Types      | Packs/Day | Years  | Date             |
|               |            |           | Used   |                  |
+---------------+------------+-----------+--------+------------------+
| Former Smoker | Cigarettes | 1.5       | 5      | Quit: 1979 |
+---------------+------------+-----------+--------+------------------+
 
 
 
+---------------------+---+---+---+
| Smokeless Tobacco:  |   |   |   |
| Never Used          |   |   |   |
+---------------------+---+---+---+
 
 
 
 
+-------------+----------------------+---------+------------------+
| Alcohol Use | Drinks/Week          | oz/Week | Comments         |
+-------------+----------------------+---------+------------------+
| Yes         |   1 Shots of liquor  | 1.0     | "social drinker" |
|             |  0 Standard drinks   |         |                  |
|             | or equivalent        |         |                  |
+-------------+----------------------+---------+------------------+
 
 
 
+------------------+---------------+
| Sex Assigned at  | Date Recorded |
| Birth            |               |
+------------------+---------------+
| Not on file      |               |
+------------------+---------------+
 documented as of this encounter
 
 Miscellaneous Notes
 Telephone Encounter - Nusrat Bustillos RN - 2018  4:20 PM PSTSpoke with patient 
and informed her I scheduled her MR enterography for  checking in at 8:30 as 
she will need to start the drinks two hours prior to a 10:30 MR enterography.  No solids 6 h
ours prior but clear liquids okay although no dairy and no carbonation.  Patient states she 
has had no further diarrhea since she saw Dr. Leiva so she may want to cancel this MR entero
graphy.  She will see how she does the next week or 2 and if diarrhea returns she will keep 
appointment for MR enterography.  If no further diarrhea she will call to cancel the MR ente
rography.  She will need Ativan premed to take along to the MR enterography.  Patient states
 she has had no hip surgery.Electronically signed by Nusrat Bustillos RN at 2018  
4:24 PM PSTTelephone Encounter - Charu Rosas - 2018  3:51 PM PSTPatient called 
back to schedule her MRI enterography and her  Raj's EGD and manometry.  Please marivel fu her back at [phone] signed by Charu Rosas at 2018  3:52 PM 
PSTTelephone Encounter - Nusrat Bustillos RN - 2018 11:49 AM PSTLeft message offe
ring  for her MR enterography and we could also schedule her spouse 's  EGD and manom
etry that dayElectronically signed by Nusrat Bustillos RN at 2018 11:50 AM Fidencio
mented in this encounter
 
 Plan of Treatment
 
 
+--------+---------+-----------+----------------------+-------------+
| Date   | Type    | Specialty | Care Team            | Description |
+--------+---------+-----------+----------------------+-------------+
| 10/12/ | Office  | Neurology |   Jamin Brooks MD  [address] |             |
| 2020   | Visit   |           |  [address]      |             |
|        |         |           | [place]  |             |
|        |         |           | [place] [address]             |             |
|        |         |           | [phone]         |             |
|        |         |           | [phone] (Fax)   |             |
+--------+---------+-----------+----------------------+-------------+
 documented as of this encounter
 
 Visit Diagnoses
 Not on filedocumented in this encounter

## 2020-09-21 NOTE — XMS
Encounter Summary
  Created on: 2020
 
 SayraSusana
 External Reference #: [id number]
 : 48
 Sex: Female
 
 Demographics
 
 
+-----------------------+---------------------------+
| Address               | [address]            |
|                       | [place]  [address] |
+-----------------------+---------------------------+
| Home Phone            | [phone]           |
+-----------------------+---------------------------+
| Preferred Language    | Unknown                   |
+-----------------------+---------------------------+
| Marital Status        |                    |
+-----------------------+---------------------------+
| Confucianism Affiliation | [id number]                      |
+-----------------------+---------------------------+
| Race                  | White                     |
+-----------------------+---------------------------+
| Ethnic Group          | Not  or     |
+-----------------------+---------------------------+
 
 
 Author
 
 
+--------------+--------------------------------------------+
| Author       | [facility]  |
|              | and [place]                                |
+--------------+--------------------------------------------+
| Organization | [facility]  |
|              | and [place]                                |
+--------------+--------------------------------------------+
| Address      | Unknown                                    |
+--------------+--------------------------------------------+
| Phone        | Unavailable                                |
+--------------+--------------------------------------------+
 
 
 
 Support
 
 
+-------------+--------------+-------------------+-----------------+
| Name        | Relationship | Address           | Phone           |
+-------------+--------------+-------------------+-----------------+
| Eduar Colbert | ECON         | [address]       | [phone] |
|             |              | [place]   |                 |
|             |              | [address]             |                 |
+-------------+--------------+-------------------+-----------------+
 
 
 
 
 Care Team Providers
 
 
+------------------------+------+-----------------+
| Care Team Member Name  | Role | Phone           |
+------------------------+------+-----------------+
| Álvaro Cisse MD | PCP  | [phone] |
+------------------------+------+-----------------+
 
 
 
 Reason for Visit
 
 
+-------------+--------------+
| Reason      | Comments     |
+-------------+--------------+
| New Patient | NEW PATIENT  |
+-------------+--------------+
 Evaluate & Treat (Urgent)
 
+------------+--------+------------+--------------+--------------+---------------+
| Status     | Reason | Specialty  | Diagnoses /  | Referred By  | Referred To   |
|            |        |            | Procedures   | Contact      | Contact       |
+------------+--------+------------+--------------+--------------+---------------+
| Authorized |        | Cardiology |   Diagnoses  |   Tanna,  |   Rachelle,       |
|            |        |            |  Other       | Álvaro MCGOWAN,   | DO Keena   |
|            |        |            | specified    | MD  [address]  | [address] |
|            |        |            | anxiety      | [address]  |  [address]    |
|            |        |            | disorders    |  [place],  | [place], [place]  |
|            |        |            | Procedures   | [place] [address]     | [address]  Phone: |
|            |        |            | Consult      | Phone:       |  [phone] |
|            |        |            |              | [phone] |   Fax:        |
|            |        |            |              |   Fax:       | [phone]  |
|            |        |            |              | [phone] |               |
+------------+--------+------------+--------------+--------------+---------------+
 
 
 
 
 Encounter Details
 
 
+--------+---------+----------------------+----------------------+--------------------+
| Date   | Type    | Department           | Care Team            | Description        |
+--------+---------+----------------------+----------------------+--------------------+
| / | Office  |   [facility]      |   Keena Bush DO  | PAROXYSMAL ATRIAL  |
| 2020   | Visit   | CARDIOLOGY [place] |  [address]    | FIBRILLATION       |
|        |         |   [address]    | [place], [place]  | (Primary Dx);      |
|        |         | [address]          | [address]  [phone]  | Paroxysmal atrial  |
|        |         | [place]        |  [phone] (Fax)  | fibrillation (HCC) |
|        |         | [address]           |                      |                    |
|        |         | [phone]         |                      |                    |
+--------+---------+----------------------+----------------------+--------------------+
 
 
 
 Social History
 
 
 
+---------------+------------+-----------+--------+------------------+
| Tobacco Use   | Types      | Packs/Day | Years  | Date             |
|               |            |           | Used   |                  |
+---------------+------------+-----------+--------+------------------+
| Former Smoker | Cigarettes | 1.5       | 5      | Quit: 1979 |
+---------------+------------+-----------+--------+------------------+
 
 
 
+---------------------+---+---+---+
| Smokeless Tobacco:  |   |   |   |
| Never Used          |   |   |   |
+---------------------+---+---+---+
 
 
 
+-------------+----------------------+---------+------------------+
| Alcohol Use | Drinks/Week          | oz/Week | Comments         |
+-------------+----------------------+---------+------------------+
| Yes         |   1 Shots of liquor  | 1.0     | "social drinker" |
|             |  0 Standard drinks   |         |                  |
|             | or equivalent        |         |                  |
+-------------+----------------------+---------+------------------+
 
 
 
+------------------+---------------+
| Sex Assigned at  | Date Recorded |
| Birth            |               |
+------------------+---------------+
| Not on file      |               |
+------------------+---------------+
 documented as of this encounter
 
 Last Filed Vital Signs
 
 
+-------------------+------------------+----------------------+----------+
| Vital Sign        | Reading          | Time Taken           | Comments |
+-------------------+------------------+----------------------+----------+
| Blood Pressure    | 140/66           | 2020  3:15 PM  |          |
|                   |                  | PDT                  |          |
+-------------------+------------------+----------------------+----------+
| Pulse             | 54               | 2020  3:15 PM  |          |
|                   |                  | PDT                  |          |
+-------------------+------------------+----------------------+----------+
| Temperature       | -                | -                    |          |
+-------------------+------------------+----------------------+----------+
| Respiratory Rate  | -                | -                    |          |
+-------------------+------------------+----------------------+----------+
| Oxygen Saturation | 98%              | 2020  3:15 PM  |          |
|                   |                  | PDT                  |          |
+-------------------+------------------+----------------------+----------+
| Inhaled Oxygen    | -                | -                    |          |
| Concentration     |                  |                      |          |
+-------------------+------------------+----------------------+----------+
| Weight            | 62.1 kg (137 lb) | 2020  3:15 PM  |          |
|                   |                  | PDT                  |          |
+-------------------+------------------+----------------------+----------+
 
| Height            | 167.6 cm (5' 6") | 2020  3:15 PM  |          |
|                   |                  | PDT                  |          |
+-------------------+------------------+----------------------+----------+
| Body Mass Index   | 22.11            | 2020  3:15 PM  |          |
|                   |                  | PDT                  |          |
+-------------------+------------------+----------------------+----------+
 documented in this encounter
 
 Progress Notes
 BushKeena weaver,  - 2020  2:20 PM PDTFormatting of this note might be different from 
the original.
 [facility] Cardiology
 Cardiology Consult Note
 
 Reason for Consultation: CP/HTN
 Requesting Physician: Álvaro Cisse
 History Obtained From: CP
 HISTORY OF PRESENT ILLNESS:
 Cardiac Problem List
 1. Paroxysmal Atrial fibrillation/flutter- followed by Dr. Wilson- [facility]
 -2009 started on flecainide
    Side effects with flecainide and switched to amiodarone
    2011 atrial fibrillation ablation, Dr. Wilson
    2017 atrial flutter diagnosed
    2018 repeat ablation and reisolation of pulmonary veins, ablation of low voltage 
regions on the left atrial septum, ablation of a right and left atrial tachycardia
 2. PVCs
 3. HLD
 Non Cardiac Problem List
 Mild cognitive decline
 Anxiety/Depression
 
 The patient is a very pleasant 69-year-old female, who presents to the Cardiology office fo
r initial consultation due to chest discomfort.  The patient has a history of paroxysmal atr
ial fibrillation and has undergone 2 EP studies with subsequent pulmonary vein isolation.  S
he reports that since her second ablation procedure, she has not had any episodes of atrial 
fibrillation whatsoever.  She is currently followed by Electrophysiology at  [facility].  Recently, she has noticed some intermittent chest discomfort, which is intermitten
t throughout the day.  It is described as a dull substernal chest pain.  She believes that i
t may be related to drinking up to 6 cups of coffee a day.  The patient has been suffering s
ome issues with short-term memory loss recently.  Her mother did have a history of Alzheimer
's disease.  She has been worked up by Neurology.  She walks  about 1.5 miles per day and ha
s not noticed any association of the chest pain with exertion.  She has not noticed any horner
ge in the pain with deep breathing or with position.  The pain typically lasts a few minutes
 and subsides spontaneously.  She has been getting it a couple of times per week.  She denie
s any episodes of syncope or presyncope.  She denies any lower extremity swelling, orthopnea
, PND.
 
 Review of Systems 
 Constitutional: Negative for fatigue. 
 HENT: Negative for nosebleeds.  
 Eyes: Negative for visual disturbance. 
 Respiratory: Negative for cough and shortness of breath.  
 Cardiovascular:see HPI
 Gastrointestinal: Negative for nausea, vomiting, abdominal pain and blood in stool. 
 Genitourinary: Negative for hematuria or dysuria. 
 Musculoskeletal: Negative for myalgias, back pain and arthralgias. 
 Skin: Negative for color change. 
 Neurological: Negative for dizziness, syncope and numbness. Positive for mild cognitive dec
line. 
 
 Hematological: Does not bruise/bleed easily. 
 Psychiatric/Behavioral: The patient is not nervous/anxious.  
 
 PAST MEDICAL & SURGICAL HISTORY
 Past Medical History: 
 Diagnosis Date 
   Anomalous atrioventricular excitation 2014 
   Arthritis  
   Atrial fibrillation (HCC) 2014 
   Atrial flutter (HCC)  
   Depression with anxiety 2014 
   Disorder of bone and cartilage, unspecified 2014 
   Esophageal reflux 2014 
   Essential hypertension 2014 
   H/O campylobacteriosis 2017 
   Hyperlipidemia 2014 
   Internal derangement of knee 2014 
   Osteoarthritis  
   Osteopenia 2014 
   Postmenopausal disorder 2014 
   Unspecified essential hypertension 2014 
   Unspecified internal derangement of knee 2014 
 
 Past Surgical History: 
 Procedure Laterality Date 
   ATRIAL ABLATION SURGERY   
  Dr. Wilson 
   CARPAL TUNNEL RELEASE Left  
   CARPAL TUNNEL RELEASE Right  
   CATARACT REMOVAL   
   COLONOSCOPY   
   COLONOSCOPY N/A 2017 
  Procedure: COLONOSCOPY;  Surgeon: Mahin Leiva MD;  Location: [facility] MEDICAL PROCEDURE UNIT
 
   KNEE ARTHROSCOPY Left  
   KNEE JOINT REPLACEMENT Left 2015 
  Dr. Mike 
   TONSILLECTOMY   
   UPPER GASTROINTESTINAL ENDOSCOPY N/A 2017 
  Procedure: EGD;  Surgeon: Mahin Leiva MD;  Location: [facility] MEDICAL PROCEDURE UNIT 
   VAGINA SURGERY   
 
 MEDICATIONS
 Home Medications
 Outpatient Encounter Medications as of 2020 
 Medication Sig Dispense Refill 
   Calcium-Magnesium-Vitamin D (CITRACAL SLOW RELEASE) 600- MG-MG-UNIT TB24 Take 1,5
00 mg by mouth.   
   cholecalciferol (VITAMIN D-3) 400 units TABS Take 400 Units by mouth Daily.   
   citalopram (CELEXA) 20 mg tablet Take 20 mg by mouth Daily.   
   ipratropium (ATROVENT) 0.06% nasal spray INSTILL TWO SPRAYS IN EACH NOSTRIL UP TO FOUR 
TIMES DAILY AS NEEDED 15 mL 5 
   [DISCONTINUED] metoprolol succinate (TOPROL-XL) 25 mg 24 hr tablet one tablet by mouth 
daily  1 
   [DISCONTINUED] mupirocin (BACTROBAN) 2% ointment Apply topically to the nose as directe
d. 22 g 1 
   Polyethylene Glycol 400 (BLINK TEARS OP) Apply  to eye Daily as needed.   
   [DISCONTINUED] sertraline (ZOLOFT) 50 mg tablet 1 and 1/2 tablet by mouth daily (Patien
t not taking: Reported on 2020)   
 
 
 No facility-administered encounter medications on file as of 2020.  
 
 Allergies
 Allergies 
 Allergen Reactions 
   Moexipril Hydrochloride Shortness Of Breath 
   AKA Univasc 
   Metoclopramide Other (See Comments) 
   depression 
   Oxycodone Other (See Comments) 
   "makes her crazy" 
   Propafenone Other (See Comments) 
   Did not work for her Afib, she wants this on her allergy list 
 
 FAMILY HISTORY
 Family History 
 Problem Relation Age of Onset 
   Heart attack Father  
   Osteoarthritis Father  
   Hypertension Father  
   Muscle disease Father  
   Kidney disease Father  
   Gout Father  
   Osteoporosis Mother  
   Dementia Mother  
   Other (see comment) Mother  
      PAF 
   Hypertension Mother  
   Muscle disease Mother  
   Kidney disease Mother  
   Heart attack Mother  
   Cancer Mother  
   Hypertension Other  
      Sibling history 
   Heart attack Other  
      Sibling history 
   Muscle disease Other  
      Sibling history 
   Kidney disease Other  
      Sibling history 
 
 SOCIAL HISTORY
 
 Social History 
 
 Socioeconomic History 
   Marital status:  
   Spouse name: Not on file 
   Number of children: Not on file 
   Years of education: Not on file 
   Highest education level: Not on file 
 Occupational History 
   Occupation: Retired LPN 
 Social Needs 
   Financial resource strain: Not on file 
   Food insecurity: 
   Worry: Not on file 
   Inability: Not on file 
   Transportation needs: 
   Medical: Not on file 
 
   Non-medical: Not on file 
 Tobacco Use 
   Smoking status: Former Smoker 
   Packs/day: 1.50 
   Years: 5.00 
   Pack years: 7.50 
   Types: Cigarettes 
   Last attempt to quit: 1979 
   Years since quittin.3 
   Smokeless tobacco: Never Used 
 Substance and Sexual Activity 
   Alcohol use: Yes 
   Alcohol/week: 1.0 standard drinks 
   Types: 1 Shots of liquor per week 
   Comment: "social drinker" 
   Drug use: No 
   Sexual activity: Not on file 
 Lifestyle 
   Physical activity: 
   Days per week: Not on file 
   Minutes per session: Not on file 
   Stress: Not on file 
 Relationships 
   Social connections: 
   Talks on phone: Not on file 
   Gets together: Not on file 
   Attends Bahai service: Not on file 
   Active member of club or organization: Not on file 
   Attends meetings of clubs or organizations: Not on file 
   Relationship status: Not on file 
   Intimate partner violence: 
   Fear of current or ex partner: Not on file 
   Emotionally abused: Not on file 
   Physically abused: Not on file 
   Forced sexual activity: Not on file 
 Other Topics Concern 
   Not on file 
 Social History Narrative 
   Not on file 
 
 PHYSICAL EXAM
 Vital Signs: /66  | Pulse 54  | Ht 1.676 m (5' 6")  | Wt 62.1 kg (137 lb)  | SpO2 98%
  | BMI 22.11 kg/m 
 
 Physical Exam
 GENERAL:  Well developed, well nourished, in no distress.  Appears approximately stated age
.
 HEENT:  Normocephalic, atraumatic.  
 EYES:     PERRL, sclerae anicteric, no xanthelsasmas
 NECK:    No JVD, lymphadenopathy, thyromegaly, bruits.  Carotid pulses are 2+ bilaterally
 LUNGS:  Clear bilaterally, with no rales, rhonchi or wheezing noted, respirations unlabored
 HEART:  Nondisplaced PMI, regular rate and rhythm, S1, S2 normal.  No murmurs, rubs or gall
ops noted.
 ABDOMEN:  Soft, nontender, no organomegaly, masses or bruits.  Bowel sounds are normal in a
ll 4 quadrants.
 EXTREMITIES:  No edema. Radial pulses 2+ bilaterally.  DP and PT pulses are 2+ bilaterally.
 SKIN:  Warm and dry, capillary refill is normal, no lesions.
 NEUROLOGIC:  Awake, alert and oriented x 3. No focal motor deficits. 
 PSYCHIATRIC:  Appropriate, affect appears normal
 
 
 DATA
 No results found for: WBC, HGB, HCT, PLTNo results found for: INR, PTT No results found for
: NA, K, CL, CO2, BUN, CREA, GLUCOSE, MG, AST, ALT, DIGOXIN, BNP, TSHNo results found for: C
HOL, TRIG, HDL, LDL, TSH 
 
 EKG: 
 
 Last Echo: 
 2014
 Conclusions: 
 1) Normal left ventricular size and systolic function. EJECTION FRACTION
 is 65 to 70%. No left ventricular segmental wall motion abnormalities.
 2) Normal right ventricular size and systolic function. Possible mild
 hypokinesis of the RV apex.
 3) Ascending aorta diameter is at upper limits of normal at 3.6 cm.
 4) Borderline anterior mitral valve leaflet prolapse. Trivial mitral
 regurgitation.
 5) Trace aortic regurgitation.
 6) No pericardial effusion seen.
 7) Dilated inferior vena cava. Greater than 50% inspiratory change in
 the inferior vena cava dimension.
 No comparison study.
 
 Last stress test: 
 14
 CONCLUSIONS:
 1.Normal coronary arteries
 2.False positive stress test.
 
 Last cath: 
 Carotid US: 
 AAA screening: 
 Lower extremity US: 
 
 OTHERS:
 18
 EPS and Ablation
 FINAL IMPRESSIONS: 
 1. Baseline sinus bradycardia. 
 2. Normal atrioventricular node physiology. 
  2a. Retrograde atrioventricular node conduction was present. 
 3. Normal infranodal conduction. 
 4. 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
 5. Re-isolation of the pulmonarly veins
 6. Ablation of low voltage regions on the left atrial septum
 7. Ablation of septal right and left atrial tachycardia
 8. 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
 
 ASSESSMENT & PLAN
 
 1. Chest pain
 2. Paroxysmal Atrial fibrillation/flutter- followed by Dr. Wilson- [facility]
 -2009 started on flecainide
    Side effects with flecainide and switched to amiodarone
    2011 atrial fibrillation ablation, Dr. Wilson
    2017 atrial flutter diagnosed
    2018 repeat ablation and reisolation of pulmonary veins, ablation of low voltage 
regions on the left atrial septum, ablation of a right and left atrial tachycardia
 3. PVCs
 4. HLD
 
 5. Mild cognitive decline
 6. Anxiety/Depression
 
 -The patient is a 69 male who presents to the cardiology office for initial consultation re
garding chest pain.  Her chest pain has both typical and atypical features.  She also has a 
past medical history of atrial fibrillation and is followed by cardiology, EP at [facility] in [place]. She is presently not on anticoagulation. She has any paroxysms of atrial fib
rillation recently.
 - Obtain an EKG exercise stress test
 - Continue follow up with EP as scheduled. 
 - Follow up in 4 weeks
 
 Thank you for allowing me to participate in the care of this patient.
 
 Primary Care Physician:  MD Keena Ribeiro DO
 5/3/2020
 Electronically signed by Keena Bush DO at 2020  9:21 AM PDTdocumented in this enco
unter
 
 Plan of Treatment
 
 
+--------+---------+-----------+----------------------+-------------+
| Date   | Type    | Specialty | Care Team            | Description |
+--------+---------+-----------+----------------------+-------------+
| 10/12/ | Office  | Neurology |   Jamin Brooks MD  [address]             |
|    | Visit   |           |  [address]      |             |
|        |         |           | [address]  [place],  |             |
|        |         |           | [place] [address]             |             |
|        |         |           | [phone]         |             |
|        |         |           | [phone] (Fax)   |             |
+--------+---------+-----------+----------------------+-------------+
 
 
 
+------------+------+--------+----------------------+----------------------+
| Name       | Type | Priori | Associated Diagnoses | Order Schedule       |
|            |      | ty     |                      |                      |
+------------+------+--------+----------------------+----------------------+
| Stress ECG | ECG  | Routin |   PAROXYSMAL ATRIAL  | Expected:            |
|            |      | e      | FIBRILLATION         | 2020, Expires: |
|            |      |        |                      |  2021          |
+------------+------+--------+----------------------+----------------------+
 documented as of this encounter
 
 Procedures
 
 
+----------------+--------+-------------+----------------------+----------------------+
| Procedure Name | Priori | Date/Time   | Associated Diagnosis | Comments             |
|                | ty     |             |                      |                      |
+----------------+--------+-------------+----------------------+----------------------+
| ECG 12 LEAD    | Routin | 2020  |   PAROXYSMAL ATRIAL  |   Results for this   |
|                | e      |  3:26 PM    | FIBRILLATION         | procedure are in the |
|                |        | PDT         |                      |  results section.    |
+----------------+--------+-------------+----------------------+----------------------+
 documented in this encounter
 
 
 Results
 ECG 12 lead (2020  3:26 PM PDT)
 
+-------------+--------------------------+-----------+------------+--------------+
| Component   | Value                    | Ref Range | Performed  | Pathologist  |
|             |                          |           | At         | Signature    |
+-------------+--------------------------+-----------+------------+--------------+
| VENTRICULAR | 51                       | BPM       | [facility]  |              |
|  RATE EKG   |                          |           |            |              |
+-------------+--------------------------+-----------+------------+--------------+
| ATRIAL RATE | 51                       | BPM       | [facility]  |              |
+-------------+--------------------------+-----------+------------+--------------+
| P-R         | 194                      | ms        | [facility]  |              |
| INTERVAL    |                          |           |            |              |
+-------------+--------------------------+-----------+------------+--------------+
| QRS         | 88                       | ms        | [facility]  |              |
| DURATION    |                          |           |            |              |
+-------------+--------------------------+-----------+------------+--------------+
| Q-T         | 444                      | ms        | [facility]  |              |
| INTERVAL    |                          |           |            |              |
+-------------+--------------------------+-----------+------------+--------------+
| Q-T         | 409                      | ms        | [facility]  |              |
| INTERVAL    |                          |           |            |              |
| (CORRECTED) |                          |           |            |              |
+-------------+--------------------------+-----------+------------+--------------+
| P WAVE AXIS | 87                       | degrees   | [facility]  |              |
+-------------+--------------------------+-----------+------------+--------------+
| QRS AXIS    | 100                      | degrees   | [facility]  |              |
+-------------+--------------------------+-----------+------------+--------------+
| T AXIS      | 60                       | degrees   | [facility]  |              |
+-------------+--------------------------+-----------+------------+--------------+
| INTERPRETAT | Sinus bradycardiaSeptal  |           | [facility]  |              |
| ION TEXT    | infarct , age            |           |            |              |
|             | undeterminedAbnormal     |           |            |              |
|             | ECGNo previous ECGs      |           |            |              |
|             | availableConfirmed by    |           |            |              |
|             | KEENA BUSH MD ([id number])  |           |            |              |
|             | on 2020 7:54:30 AM   |           |            |              |
+-------------+--------------------------+-----------+------------+--------------+
 
 
 
+----------+
| Specimen |
+----------+
|          |
+----------+
 
 
 
+----------------------------------------------------------+--------------+
| Narrative                                                | Performed At |
+----------------------------------------------------------+--------------+
|   This result has an attachment that is not available.   |              |
+----------------------------------------------------------+--------------+
 
 
 
+--------------+---------+--------------------+--------------+
| Performing   | Address | City/State/Zipcode | Phone Number |
 
| Organization |         |                    |              |
+--------------+---------+--------------------+--------------+
|   [facility]  |         |                    |              |
+--------------+---------+--------------------+--------------+
 documented in this encounter
 
 Visit Diagnoses
 
 
+-----------------------------------------------------------------+
| Diagnosis                                                       |
+-----------------------------------------------------------------+
|   PAROXYSMAL ATRIAL FIBRILLATION - Primary  Atrial fibrillation |
+-----------------------------------------------------------------+
 documented in this encounter

## 2020-09-21 NOTE — XMS
Encounter Summary
  Created on: 2020
 
 SayraSusana
 External Reference #: [id number]
 : 48
 Sex: Female
 
 Demographics
 
 
+-----------------------+---------------------------+
| Address               | [address]            |
|                       | [place]  [address] |
+-----------------------+---------------------------+
| Home Phone            | [phone]           |
+-----------------------+---------------------------+
| Preferred Language    | Unknown                   |
+-----------------------+---------------------------+
| Marital Status        |                    |
+-----------------------+---------------------------+
| Buddhist Affiliation | 1073                      |
+-----------------------+---------------------------+
| Race                  | White                     |
+-----------------------+---------------------------+
| Ethnic Group          | Not  or     |
+-----------------------+---------------------------+
 
 
 Author
 
 
+--------------+--------------------------------------------+
| Author       | [facility]  |
|              | and [place]                                |
+--------------+--------------------------------------------+
| Organization | [facility]  |
|              | and [place]                                |
+--------------+--------------------------------------------+
| Address      | Unknown                                    |
+--------------+--------------------------------------------+
| Phone        | Unavailable                                |
+--------------+--------------------------------------------+
 
 
 
 Support
 
 
+-------------+--------------+-------------------+-----------------+
| Name        | Relationship | Address           | Phone           |
+-------------+--------------+-------------------+-----------------+
| Eduar Colbert | ECON         | [address]       | [phone] |
|             |              | [place]   |                 |
|             |              | [address]             |                 |
+-------------+--------------+-------------------+-----------------+
 
 
 
 
 Care Team Providers
 
 
+-------------------------+------+-----------------+
| Care Team Member Name   | Role | Phone           |
+-------------------------+------+-----------------+
| Tobin Alex MD | PCP  | [phone] |
+-------------------------+------+-----------------+
 
 
 
 Encounter Details
 
 
+--------+-------------+----------------------+---------------------+-------------+
| Date   | Type        | Department           | Care Team           | Description |
+--------+-------------+----------------------+---------------------+-------------+
| / | Anesthesia  |   [facility] |   Jose Carias     |             |
| 2017   | Event       |  [facility] | MD Mikey  [address]  |             |
|        |             |   [address]       | [address]  [place]    |             |
|        |             | [place], [place]      | [place] [address]     |             |
|        |             | [address]           | [phone]        |             |
|        |             | [phone]         | [phone] (Fax)  |             |
+--------+-------------+----------------------+---------------------+-------------+
 
 
 
 Anesthesia Record
 
 
+-----------------+------------------+-------------------+----------------------+
| Procedure Name  | Responsible      | Anesthesia Start  | Anesthesia Stop Time |
|                 | Anesthesiologist | Time              |                      |
+-----------------+------------------+-------------------+----------------------+
| EGD (N/A Mouth) | Jose Jensen  | 17 1015     | 17 1117        |
|                 | MD Aretha         |                   |                      |
+-----------------+------------------+-------------------+----------------------+
 
 
 
+----+---+-------------+-------------------------------------------------------------------+
| Da | T | Event       | Comment                                                           |
| te | i |             |                                                                   |
|    | m |             |                                                                   |
|    | e |             |                                                                   |
+----+---+-------------+-------------------------------------------------------------------+
| 04 | 0 |             |                                                                   |
| /1 | 9 |             |                                                                   |
| 4/ | 2 |             |                                                                   |
| 20 | 0 |             |                                                                   |
| 17 |   |             |                                                                   |
+----+---+-------------+-------------------------------------------------------------------+
|    | 1 | Antibiotic  |                                                                   |
|    | 0 | Given       |                                                                   |
|    | 0 |             |                                                                   |
|    | 5 |             |                                                                   |
+----+---+-------------+-------------------------------------------------------------------+
|    | 1 | An Checkout | Pre-use anesthesia machine/equipment checkout.                    |
|    | 0 |             |                                                                   |
 
|    | 1 |             |                                                                   |
|    | 5 |             |                                                                   |
+----+---+-------------+-------------------------------------------------------------------+
|    | 1 | An Start    | Reassessment prior to anesthesia induction/procedure.             |
|    | 0 |             |                                                                   |
|    | 1 |             |                                                                   |
|    | 5 |             |                                                                   |
+----+---+-------------+-------------------------------------------------------------------+
|    | 1 | An Start    |                                                                   |
|    | 0 | Data        |                                                                   |
|    | 1 |             |                                                                   |
|    | 5 |             |                                                                   |
+----+---+-------------+-------------------------------------------------------------------+
|    | 1 | Quick Note  | Pre-op EKG being obtained                                         |
|    | 0 |             |                                                                   |
|    | 1 |             |                                                                   |
|    | 6 |             |                                                                   |
+----+---+-------------+-------------------------------------------------------------------+
|    | 1 | Preoxygenat |                                                                   |
|    | 0 | ed          |                                                                   |
|    | 2 |             |                                                                   |
|    | 8 |             |                                                                   |
+----+---+-------------+-------------------------------------------------------------------+
|    | 1 | Pre-Procedu |                                                                   |
|    | 0 | ral Timeout |                                                                   |
|    | 2 |  Completed  |                                                                   |
|    | 9 |             |                                                                   |
+----+---+-------------+-------------------------------------------------------------------+
|    | 1 | An          |                                                                   |
|    | 0 | Induction   |                                                                   |
|    | 3 |             |                                                                   |
|    | 0 |             |                                                                   |
+----+---+-------------+-------------------------------------------------------------------+
|    | 1 | Breathing   |                                                                   |
|    | 0 | Spontaneous |                                                                   |
|    | 3 | ly          |                                                                   |
|    | 1 |             |                                                                   |
+----+---+-------------+-------------------------------------------------------------------+
|    | 1 | Quick Note  | Table turned                                                      |
|    | 0 |             |                                                                   |
|    | 4 |             |                                                                   |
|    | 2 |             |                                                                   |
+----+---+-------------+-------------------------------------------------------------------+
|    | 1 | an stop     |                                                                   |
|    | 1 | data        |                                                                   |
|    | 1 |             |                                                                   |
|    | 3 |             |                                                                   |
+----+---+-------------+-------------------------------------------------------------------+
|    | 1 | An Stop     | Patient handed off to recovery nurse.  Electronically signed by:  |
|    | 1 |             | Jose Carias MD at 2017 11:19                            |
|    | 1 |             |                                                                   |
|    | 7 |             |                                                                   |
+----+---+-------------+-------------------------------------------------------------------+
 
 
 
+------+
| Meds |
+------+
 
 
 
 
+-----------------------------------+------------+
| Name                              | Total      |
+-----------------------------------+------------+
| propofol (DIPRIVAN) injection     | 110 mg     |
| (bolus) (20 mL)                   |            |
+-----------------------------------+------------+
| propofol                          | 364.19 mg  |
+-----------------------------------+------------+
| ampicillin 2 g in sodium chloride | 0 g        |
|  0.9% 100 mL IVPB                 |            |
+-----------------------------------+------------+
| gentamicin 60 mg in sodium        | Cannot be  |
| chloride 0.9% 50 mL IVPB          | calculated |
+-----------------------------------+------------+
| Phenylephrine 100mcg/mL SYRINGE   | 100 mcg    |
+-----------------------------------+------------+
| lactated ringers (LR) infusion    | 600 mL     |
+-----------------------------------+------------+
 
 
 
+--------------------+
| No agents on file. |
+--------------------+
 
 
 
+-----------------------------------+
| No blood administrations on file. |
+-----------------------------------+
 
 
 
+--------+-----------------------------------+--------------------+---------------------+
| Type   | Details                           | Placement          | Removal             |
+--------+-----------------------------------+--------------------+---------------------+
| Periph | 17; 0945; Forearm;          | 17 0945 by   | 17 1200 by    |
| baronl   | over-the-needle catheter system;  | Dian Padilla RN | Teressa Nolasco,  |
| IV     | 20 gauge; distraction,            |                    | RN                  |
|        | intradermal injection, tolerated  |                    |                     |
|        | well; no longer indicated,        |                    |                     |
|        | catheter/device intact, removed   |                    |                     |
|        | per policy/procedure; 17;   |                    |                     |
|        | 1200                              |                    |                     |
+--------+-----------------------------------+--------------------+---------------------+
 documented in this encounter
 
 Social History
 
 
+---------------+------------+-----------+--------+------------------+
| Tobacco Use   | Types      | Packs/Day | Years  | Date             |
|               |            |           | Used   |                  |
+---------------+------------+-----------+--------+------------------+
| Former Smoker | Cigarettes | 1.5       | 5      | Quit: 1979 |
+---------------+------------+-----------+--------+------------------+
 
 
 
 
+---------------------+---+---+---+
| Smokeless Tobacco:  |   |   |   |
| Never Used          |   |   |   |
+---------------------+---+---+---+
 
 
 
+-------------+----------------------+---------+------------------+
| Alcohol Use | Drinks/Week          | oz/Week | Comments         |
+-------------+----------------------+---------+------------------+
| Yes         |   1 Shots of liquor  | 1.0     | "social drinker" |
|             |  0 Standard drinks   |         |                  |
|             | or equivalent        |         |                  |
+-------------+----------------------+---------+------------------+
 
 
 
+------------------+---------------+
| Sex Assigned at  | Date Recorded |
| Birth            |               |
+------------------+---------------+
| Not on file      |               |
+------------------+---------------+
 documented as of this encounter
 
 OR Notes
 Anesthesia Postprocedure Evaluation - Jose Carias MD - 2017 11:28 AM PDTFo
rmatting of this note might be different from the original.
 ANESTHESIA
 POSTANESTHESIA EVALUATION  Susana Colbert
 66 y.o. female
 1950
 [id number] 
 
  Awake and alert in phase II. No anesthesia complications.
 
 Procedure(s)  EGD (N/A Mouth)
 COLONOSCOPY (N/A Rectum) 
 
 Cooperates?  Yes 
 Mental Status  Performs simple tasks. 
 Respiratory  Satisfactory - Airway patent (self maintained). 
 Cardiovascular  Satisfactory    Blood pressure and heart rate acceptable 
 Temperature  Satisfactory 
 Pain  Satisfactory 
 N/V Control  Satisfactory 
 Hydration  Satisfactory    No signs of dehydration 
 Complications  None apparent 
 
 Filed Vitals: 
  17 0839 17 1116 
 BP: 119/78 82/45 
 Pulse: 67 82 
 Temp: 36.4 C (97.5 F) 36.5 C (97.7 F) 
 Resp: 18 14 
 SpO2: 100% 100% 
 
 Electronically signed by     Jose Carias MD 2017 11:28
 [facility]
 
 Electronically signed by Jose Carias MD at 2017 11:28 AM PDTAnesthesia Prep
rocedure Evaluation - Jose Carias MD - 2017  9:21 AM PDTFormatting of this 
note might be different from the original.
 ANESTHESIA
 PREANESTHESIA EVALUATION  Susana Colbert
 66 y.o. female
 1950
 [id number] 
 
 Procedure(s): EGD (N/A Mouth)
 COLONOSCOPY (N/A Rectum)
 
 Medical history, anesthesia, medications, allergy, NPO status verified histories reviewed.
 
 ECG reviewed. Labs reviewed.
 Review of Systems / Med History
 
 Anesthesia History  No anesthesia complications. Labs from 2017 reviewed in [employer]
 
 H/o total knee in 2016 - antibiotics ordered per GI
 .
 (-) PONV  
 Cardiovascular  
 .
 (+) hypertension (+) Dysrhythmias: atrial fibrillation , Exercise tolerance >4 METS  
 Pulmonary  
 (+) shortness of breath 
 Neurology  Negative except where noted below.  
 Psychology  
 (+) depression 
 Renal  
 .
   
 Gastrointestinal/Hepatic  
 (+) hyperlipidemia  
 Endocrine  
 (-) obesity.    
 Other  
 .
 (+) coagulopathy (On rivaroxiban)  
 
 Physical Exam
 
 Airway  
 MP  I,  TM >3 FB,  Mouth opening >2 FB.  Neck: full ROM,  extends >30 degrees. Jaw protrusi
on normal.  Dental Grossly normal except where noted below.; 
 (+) Age appropriate dentition.
  
 CV  cardiovascular normal 
 Rhythm regular. Rate normal. 
 
  Pulm Clear to auscultation bilaterally.  
 
 (-) wheezing and rhonchi.
  Neuro  Grossly normal. 
 
  
 
 Anesthesia Plan
 ASA 3 (A.fib, anticoagulated)
 
 Type: TIVA and general.
 Induction:  Intravenous. 
 Potential problems:  None anticipated.
 Monitors: Standard ASA monitors. 
 Consent statement:Anesthetic plan, alternatives, risks and benefits discussed with patient 
and spouse. 
 Risks discussed included (but were not limited to): pain, respiratory events, voice injury,
 dental injury, perioperative CV events, sore throat, nausea, .
 
 Consenting person understands and agrees to proceed. PARQ. 
 All questions answered in pre-op area before proceeding to GI suite - agrees to plan of TIV
A with possibility of advanced airway if clinically indicated.
 .
 
 Electronically Signed by: Jose Carias MD   ESig date/time: 2017 9:20 
 
  
 Electronically signed by Jose Carias MD at 2017  9:21 AM PDTdocumented in t
his encounter
 
 Plan of Treatment
 
 
+--------+---------+-----------+----------------------+-------------+
| Date   | Type    | Specialty | Care Team            | Description |
+--------+---------+-----------+----------------------+-------------+
| 10/12/ | Office  | Neurology |   Jamin Brooks MD  [address] |             |
| 2020   | Visit   |           |  [address]      |             |
|        |         |           | [address]  [place],  |             |
|        |         |           | [place] [address]             |             |
|        |         |           | [phone]         |             |
|        |         |           | [phone] (Fax)   |             |
+--------+---------+-----------+----------------------+-------------+
 documented as of this encounter
 
 Visit Diagnoses
 Not on filedocumented in this encounter
 
 Administered Medications
 
 
+-----------------------------------+---------+----------+-------+-------+------+
| Medication Order                  | MAR     | Action   | Dose  | Rate  | Site |
|                                   | Action  | Date     |       |       |      |
+-----------------------------------+---------+----------+-------+-------+------+
|   gentamicin 60 mg in sodium      | New Bag | 20 | 60 mg | 103   |      |
| chloride 0.9% 50 mL IVPB  60 mg,  |         | 17  9:58 |       | mL/hr |      |
| Intravenous, Administer over 30   |         |  AM PDT  |       |       |      |
| Minutes, Prior to Incision,       |         |          |       |       |      |
| Starting 17 at 0937, For |         |          |       |       |      |
|  1 dose, TO BE GIVEN PRIOR TO     |         |          |       |       |      |
| PROCEDURE, Pre-op, Indications:   |         |          |       |       |      |
| total knee replacement            |         |          |       |       |      |
+-----------------------------------+---------+----------+-------+-------+------+
 
 
 
+---------+----------+---+---+---+
| New Bag | 20 |   |   |   |
|         | 17  9:57 |   |   |   |
 
|         |  AM PDT  |   |   |   |
+---------+----------+---+---+---+
 
 
 
+---+---+
|   |   |
+---+---+
 
 
 
+-----------------------------------+---------+----------+---+---+---+
|   lactated ringers (LR) infusion  | New Bag | 20 |   |   |   |
|  at  mL/hr, Intravenous,    |         | 17  9:45 |   |   |   |
| CONTINUOUS, Starting 17  |         |  AM PDT  |   |   |   |
| at 1000, TKO., Pre-op             |         |          |   |   |   |
+-----------------------------------+---------+----------+---+---+---+
 
 
 
+---------+----------+---+----------+---+
| New Bag | 20 |   | 50 mL/hr |   |
|         | 17  9:45 |   |          |   |
|         |  AM PDT  |   |          |   |
+---------+----------+---+----------+---+
 
 
 
+---+---+
|   |   |
+---+---+
 
 
 
+-----------------------------------+-------+----------+---------+---+---+
|   phenylephrine (ERNESTO-SYNEPHRINE)  | Given | 20 | 100 mcg |   |   |
| 100 mcg/mL injection              |       | 17 10:56 |         |   |   |
| Intravenous, PRN, Starting Fri    |       |  AM PDT  |         |   |   |
| 17 at 1056, Anesthesia       |       |          |         |   |   |
| Intra-op                          |       |          |         |   |   |
+-----------------------------------+-------+----------+---------+---+---+
 
 
 
+---+---+
|   |   |
+---+---+
 
 
 
+-----------------------------------+----------+----------+----------+----------+---+
|   propofol (DIPRIVAN) injection   | Rate/Dos | 20 | 75       | 27 mL/hr |   |
| Intravenous, CONTINUOUS PRN,      | e Change | 17 11:01 | mcg/kg/m |          |   |
| Starting Fri 17 at 1030,     |          |  AM PDT  | in       |          |   |
| Anesthesia Intra-op               |          |          |          |          |   |
+-----------------------------------+----------+----------+----------+----------+---+
 
 
 
+------------------+----------+----------+-------+---+
 
| Rate/Dose Change | 20 | 80       | 28.8  |   |
|                  | 17 10:50 | mcg/kg/m | mL/hr |   |
|                  |  AM PDT  | in       |       |   |
+------------------+----------+----------+-------+---+
| Rate/Dose Change | 20 | 140      | 50.3  |   |
|                  | 17 10:45 | mcg/kg/m | mL/hr |   |
|                  |  AM PDT  | in       |       |   |
+------------------+----------+----------+-------+---+
 
 
 
+---+---+
|   |   |
+---+---+
 
 
 
+-----------------------------------+-------+----------+-------+---+---+
|   propofol (DIPRIVAN) injection   | Given | 20 | 30 mg |   |   |
| Intravenous, PRN, Starting Fri    |       | 17 10:32 |       |   |   |
| 17 at 1030, Anesthesia       |       |  AM PDT  |       |   |   |
| Intra-op                          |       |          |       |   |   |
+-----------------------------------+-------+----------+-------+---+---+
 
 
 
+-------+----------+-------+---+---+
| Given | 20 | 30 mg |   |   |
|       | 17 10:31 |       |   |   |
|       |  AM PDT  |       |   |   |
+-------+----------+-------+---+---+
| Given | 20 | 50 mg |   |   |
|       | 17 10:30 |       |   |   |
|       |  AM PDT  |       |   |   |
+-------+----------+-------+---+---+
 
 
 
+---+---+
|   |   |
+---+---+
 documented in this encounter

## 2020-09-21 NOTE — XMS
Encounter Summary
  Created on: 2020
 
 SayraSusana
 External Reference #: [id number]
 : 48
 Sex: Female
 
 Demographics
 
 
+-----------------------+---------------------------+
| Address               | [address]            |
|                       | [place]  [address] |
+-----------------------+---------------------------+
| Home Phone            | [phone]           |
+-----------------------+---------------------------+
| Preferred Language    | Unknown                   |
+-----------------------+---------------------------+
| Marital Status        |                    |
+-----------------------+---------------------------+
| Jew Affiliation | [id number]                      |
+-----------------------+---------------------------+
| Race                  | White                     |
+-----------------------+---------------------------+
| Ethnic Group          | Not  or     |
+-----------------------+---------------------------+
 
 
 Author
 
 
+--------------+--------------------------------------------+
| Author       | [facility]  |
|              | [facility] [place]                                |
+--------------+--------------------------------------------+
| Organization | [facility]  |
|              | [facility] [place]                                |
+--------------+--------------------------------------------+
| Address      | Unknown                                    |
+--------------+--------------------------------------------+
| Phone        | Unavailable                                |
+--------------+--------------------------------------------+
 
 
 
 Support
 
 
+-------------+--------------+-------------------+-----------------+
| Name        | Relationship | Address           | Phone           |
+-------------+--------------+-------------------+-----------------+
| Eduar Colbert | ECON         | [address]       | [phone] |
|             |              | [place]   |                 |
|             |              | [id number]             |                 |
+-------------+--------------+-------------------+-----------------+
 
 
 
 
 Care Team Providers
 
 
+-------------------------+------+-----------------+
| Care Team Member Name   | Role | Phone           |
+-------------------------+------+-----------------+
| Tobin Alex MD | PCP  | [phone] |
+-------------------------+------+-----------------+
 
 
 
 Reason for Referral
 Evaluate & Treat (Routine)
 
+--------+--------------+---------------+--------------+--------------+---------------+
| Status | Reason       | Specialty     | Diagnoses /  | Referred By  | Referred To   |
|        |              |               | Procedures   | Contact      | Contact       |
+--------+--------------+---------------+--------------+--------------+---------------+
| Closed |   Specialty  | Gastroenterol |   Diagnoses  |   Harri,     |   Harri,      |
|        | Services     | ogy           |  Reflux      | Mahin CHARLES MD  | Mahin CHARLES MD   |
|        | Required     |               | History of   |  [address]       | [address] |
|        |              |               | colonic      | [address]  |  [address]      |
|        |              |               | polyps       | [address]  [place]   | [place],  |
|        |              |               | Change in    | [place]    | [place] [address]      |
|        |              |               | bowel habits | [id number]        | Phone:        |
|        |              |               |   Procedures | Phone:       | [phone]  |
|        |              |               |   SD         | [phone] |  Fax:         |
|        |              |               | COLONOSCOPY  |   Fax:       | [phone]  |
|        |              |               | FLX DX       | [phone] |               |
|        |              |               | W/COLLJ SPEC |              |               |
|        |              |               |  WHEN PFRMD  |              |               |
|        |              |               |  SD          |              |               |
|        |              |               | COLONOSCOPY  |              |               |
|        |              |               | W/BIOPSY     |              |               |
|        |              |               | SINGLE/MULTI |              |               |
|        |              |               | PLE  SD      |              |               |
|        |              |               | COLSC FLX    |              |               |
|        |              |               | W/RMVL OF    |              |               |
|        |              |               | TUMOR POLYP  |              |               |
|        |              |               | LESION SNARE |              |               |
|        |              |               |  TQ  SD      |              |               |
|        |              |               | ESOPHAGOGAST |              |               |
|        |              |               | RODUODENOSCO |              |               |
|        |              |               | PY TRANSORAL |              |               |
|        |              |               |  DIAGNOSTIC  |              |               |
|        |              |               |  SD EDG      |              |               |
|        |              |               | TRANSORAL    |              |               |
|        |              |               | BIOPSY       |              |               |
|        |              |               | SINGLE/MULTI |              |               |
|        |              |               | PLE          |              |               |
+--------+--------------+---------------+--------------+--------------+---------------+
 
 
 
 
 Reason for Visit
 
 
+---------------+----------+
 
| Reason        | Comments |
+---------------+----------+
| Colon Cancer  |          |
| Screening     |          |
+---------------+----------+
 Self-referral (Routine)
 
+--------+--------+---------------+--------------+--------------+---------------+
| Status | Reason | Specialty     | Diagnoses /  | Referred By  | Referred To   |
|        |        |               | Procedures   | Contact      | Contact       |
+--------+--------+---------------+--------------+--------------+---------------+
| Closed |        | Gastroenterol |   Diagnoses  |              |   [facility],      |
|        |        | ogy           |              |              | Mahin CHARLES MD   |
|        |        |               | CS/medicare/ |              | [address], |
|        |        |               | Sitz/pt      |              |  [address]      |
|        |        |               | Procedures   |              | [place],  |
|        |        |               | OFFICE VISIT |              | [place] [address]      |
|        |        |               |  REGULAR     |              | Phone:        |
|        |        |               |              |              | [phone]  |
|        |        |               |              |              |  Fax:         |
|        |        |               |              |              | [phone]  |
+--------+--------+---------------+--------------+--------------+---------------+
 
 
 
 
 Encounter Details
 
 
+--------+---------+----------------------+----------------------+----------------------+
| Date   | Type    | Department           | Care Team            | Description          |
+--------+---------+----------------------+----------------------+----------------------+
| 10/27/ | Office  |   [facility]          |   Mahin Leiva MD | Reflux (Primary Dx); |
|    | Visit   | GASTROENTEROLOGY     |   [address]  |  History of colonic  |
|        |         | [address]  | [address]  [place] | polyps; Change in    |
|        |         | [address]  [place], [place] |  [address] [phone] | bowel habits         |
|        |         |  [address]          |   [phone] (Fax) |                      |
|        |         | [phone]         |                      |                      |
+--------+---------+----------------------+----------------------+----------------------+
 
 
 
 Social History
 
 
+---------------+-------+-----------+--------+------+
| Tobacco Use   | Types | Packs/Day | Years  | Date |
|               |       |           | Used   |      |
+---------------+-------+-----------+--------+------+
| Former Smoker |       |           |        |      |
+---------------+-------+-----------+--------+------+
 
 
 
+---------------------+---+---+---+
| Smokeless Tobacco:  |   |   |   |
| Never Used          |   |   |   |
+---------------------+---+---+---+
 
 
 
 
+------------------------+
| Comments: 10 years ago |
+------------------------+
 
 
 
+-------------+----------------------+---------+------------------+
| Alcohol Use | Drinks/Week          | oz/Week | Comments         |
+-------------+----------------------+---------+------------------+
| Yes         |   0 Standard drinks  | 0.0     | "social drinker" |
|             | or equivalent        |         |                  |
+-------------+----------------------+---------+------------------+
 
 
 
+------------------+---------------+
| Sex Assigned at  | Date Recorded |
| Birth            |               |
+------------------+---------------+
| Not on file      |               |
+------------------+---------------+
 documented as of this encounter
 
 Last Filed Vital Signs
 
 
+-------------------+--------------------+----------------------+----------+
| Vital Sign        | Reading            | Time Taken           | Comments |
+-------------------+--------------------+----------------------+----------+
| Blood Pressure    | 132/74             | 10/27/2015  2:55 PM  |          |
|                   |                    | PDT                  |          |
+-------------------+--------------------+----------------------+----------+
| Pulse             | 74                 | 10/27/2015  2:55 PM  |          |
|                   |                    | PDT                  |          |
+-------------------+--------------------+----------------------+----------+
| Temperature       | -                  | -                    |          |
+-------------------+--------------------+----------------------+----------+
| Respiratory Rate  | 16                 | 10/27/2015  2:55 PM  |          |
|                   |                    | PDT                  |          |
+-------------------+--------------------+----------------------+----------+
| Oxygen Saturation | 100%               | 10/27/2015  2:55 PM  |          |
|                   |                    | PDT                  |          |
+-------------------+--------------------+----------------------+----------+
| Inhaled Oxygen    | -                  | -                    |          |
| Concentration     |                    |                      |          |
+-------------------+--------------------+----------------------+----------+
| Weight            | 63.7 kg (140 lb 8  | 10/27/2015  2:55 PM  |          |
|                   | oz)                | PDT                  |          |
+-------------------+--------------------+----------------------+----------+
| Height            | 170.2 cm (5' 7")   | 10/27/2015  2:55 PM  |          |
|                   |                    | PDT                  |          |
+-------------------+--------------------+----------------------+----------+
| Body Mass Index   | 22.01              | 10/27/2015  2:55 PM  |          |
|                   |                    | PDT                  |          |
+-------------------+--------------------+----------------------+----------+
 documented in this encounter
 
 Progress Notes
 Mahin Leiva MD - 10/27/2015  3:27 PM PDTFormatting of this note might be different from
 
 the original.
 Subjective: 
  
 Patient ID: Susana Colbert is a 65 y.o. female.
 
 HPI Comments: Patient is seen for 2 gastrointestinal tract concerns gastroesophageal reflux
 change in bowel pattern past history of colonic polyps .  Outside records are reviewed
 
 The patient  is on Protonix 40 mg a day for reflux.  Upper endoscopy done 2011 was 
negative for Whitfield's metaplasia.  Did have evidence of esophagitis.  She reports that she 
has breakthrough symptoms of epigastric and retrosternal burning pain.  When that occurs she
 takes Gaviscon and symptoms are improved.  She has rare episodes of dysphagia at that time 
she has increased symptoms of reflux. patient has had no weight loss denies hematemesis or m
farzad.  There is no family history of Whitfield's metaplasia.  The patient does not follow an 
antireflux regimen but is not obese
 
 She has also noticed a change in bowel pattern.  She is to have episodes of constipation ha
s been taking a probiotic and her constipation has resolved.  She states that if she takes a
 probiotic on a continuous basis she'll have loose stools.  An additional stools are soft bu
t occasionally are difficult to cleanse the perineal area.  She's noticed no blood or mucus 
in the stools.  Denies any pain during evacuation In 2011 showed 3 adenomatous polyp
s removed from the hepatic flexure.  There is no family history of colon cancer or polyps sh
e complains of right lower quadrant pain not associated with meals or with defecation.  She 
has noticed no decrease in diameter of the stool colonoscopy in  was remarkable for redu
ndancy and atonicity
 
 Chart review is remarkable for left partial knee replacement atrial fibrillation treated wi
th ablation therapy I pretension.  The patient asked questions with respect to the safety of
 long-term PPI therapy and her questions were answered with respect to the same
 
 Filed Vitals: 
  10/27/15 1455 
 BP: 132/74 
 Pulse: 74 
 Resp: 16 
 PainSc:   0 - No pain 
 
 Allergies 
 Allergen Reactions 
   Metoclopramide  
   depression 
   Moexipril  
   dyspnea 
   Moexipril Hydrochloride  
 
 Past Medical History 
 Diagnosis Date 
   Anomalous atrioventricular excitation 2014 
   Depression with anxiety 2014 
   Essential hypertension 2014 
   Hyperlipidemia 2014 
   Internal derangement of knee 2014 
   Osteopenia 2014 
   Postmenopausal disorder 2014 
   Unspecified essential hypertension 2014 
   Disorder of bone and cartilage, unspecified 2014 
   Unspecified internal derangement of knee 2014 
   Atrial fibrillation (HCC) 2014 
   Esophageal reflux 2014 
   Arthritis  
 
 
 Past Surgical History 
 Procedure Laterality Date 
   Atrial ablation surgery   
   Dr. Wilson 
   Knee arthroscopy Left  
   Knee joint replacement Left 2015 
   Dr. Mike 
 
 Family History 
 Problem Relation Age of Onset 
   Osteoporosis Mother  
    at 80 
   Dementia Mother  
   Other (See Comment) Father  
    at 60 
 
 History 
 
 Social History 
   Marital Status:  
   Spouse Name: N/A 
   Number of Children: N/A 
   Years of Education: N/A 
 
 Occupational History 
   Retired LPN  
 
 Social History Main Topics 
   Smoking status: Former Smoker 
   Smokeless tobacco: Never Used 
    Comment: 10 years ago 
   Alcohol Use: 0.0 oz/week 
   0 Not specified per week 
    Comment: "social drinker" 
   Drug Use: No 
   Sexual Activity: None 
 
 Other Topics Concern 
   None 
 
 Social History Narrative 
 
 Review of Systems 
 HENT: Positive for postnasal drip and rhinorrhea.  
 Eyes: Positive for pain. 
 Respiratory: Negative.  
 Cardiovascular: Positive for palpitations. 
 Gastrointestinal: Positive for diarrhea and constipation. 
 Endocrine: Negative.  
 Genitourinary: Negative.  
 Musculoskeletal: Positive for joint swelling and arthralgias. 
 Skin: Negative.  
 Allergic/Immunologic: Negative.  
 Neurological: Positive for weakness. 
 
 Objective: 
 Physical Exam 
 Constitutional: She is oriented to person, place, and time. She appears well-developed and 
well-nourished. No distress. 
 
 HENT: 
 Head: Normocephalic and atraumatic. 
 Right Ear: External ear normal. 
 Left Ear: External ear normal. 
 Nose: Nose normal. 
 Mouth/Throat: Oropharynx is clear and moist. No oropharyngeal exudate. 
 Eyes: Conjunctivae and EOM are normal. Pupils are equal, round, and reactive to light. Righ
t eye exhibits no discharge. Left eye exhibits no discharge. No scleral icterus. 
 Neck: Normal range of motion. Neck supple. No JVD present. No tracheal deviation present. 
 Cardiovascular: Normal rate, regular rhythm, normal heart sounds and intact distal pulses. 
 Exam reveals no gallop and no friction rub.  
 No murmur heard.
 Pulmonary/Chest: Effort normal and breath sounds normal. No stridor. No respiratory distres
s. She has no wheezes. She has no rales. 
 Abdominal: Soft. Bowel sounds are normal. She exhibits no distension and no mass. There is 
no tenderness. There is no rebound and no guarding. 
 Decreased bowel sounds tympany to percussion throughout upper abdomen 
 Musculoskeletal: Normal range of motion. She exhibits no edema or tenderness. 
 Lymphadenopathy: 
   She has no cervical adenopathy. 
 Neurological: She is alert and oriented to person, place, and time. No cranial nerve defici
t. She exhibits normal muscle tone. Coordination normal. 
 Skin: Skin is warm and dry. No rash noted. No erythema. No pallor. 
 Psychiatric: She has a normal mood and affect. Her behavior is normal. Judgment and thought
 content normal. 
 Nursing note and vitals reviewed.
 
 Assessment: 
 
 Gastroesophageal reflux proton pump inhibitor dependent occasional breakthrough symptoms re
quiring additional antacid therapy
 
 Change in bowel pattern probably secondary to probiotic use but with past history of 3 radha
omatous polyps removed from the hepatic flexure appropriate candidate for follow-up colonosc
opy
 
 Recent partial total knee appropriate candidate for antibiotic therapy prior to colonoscopy
 
 Plan: 
 
 Upper endoscopy colonoscopy.  Benefits and risks of the procedure are explained to the ayala
ent she concurs and she'll be scheduled as an outpatient at her convenience with antibiotic 
prophylaxis
 
 Portions of this report were transcribed using voice recognition software.  Every effort wa
s made to ensure accuracy; however, inadvertent computerized transcription errors may be pre
sent.
 
 Electronically signed by Mahin Leiva MD at 10/27/2015  4:00 PM PDTdocumented in this enc
ounter
 
 Miscellaneous Notes
 Addendum Note - Nusrat Hernandez RN - 10/27/2015  4:14 PM PDT Addended by: SANDRA HERNANDEZ on: 10/27/2015 16:14
 
   Modules accepted: Orders
 
   Electronically signed by Nusrat Hernandez RN at 10/27/2015  4:14 PM PDTdocumented in 
this encounter
 
 
 Plan of Treatment
 
 
+--------+---------+-----------+----------------------+-------------+
| Date   | Type    | Specialty | Care Team            | Description |
+--------+---------+-----------+----------------------+-------------+
| 10/12/ | Office  | Neurology |   Jamin Brooks MD  [address] |             |
|    | Visit   |           |  [facility]      |             |
|        |         |           | [place],  |             |
|        |         |           | [place] [address]             |             |
|        |         |           | [phone]         |             |
|        |         |           | [phone] (Fax)   |             |
+--------+---------+-----------+----------------------+-------------+
 
 
 
+----------------------+-------------+--------+----------------------+----------------------
+
| Name                 | Type        | Priori | Associated Diagnoses | Order Schedule       
|
|                      |             | ty     |                      |                      
|
+----------------------+-------------+--------+----------------------+----------------------
+
| Ambulatory referral  | Outpatient  | Routin |   Acid reflux        | Expected:            
|
| to Gastroenterology  | Referral    | e      | disease  History of  | 11/10/2015, Expires: 
|
| ([facility])              |             |        | colonic polyps       |  10/26/2016          
|
|                      |             |        | Change in bowel      |                      
|
|                      |             |        | habits               |                      
|
+----------------------+-------------+--------+----------------------+----------------------
+
 documented as of this encounter
 
 Visit Diagnoses
 
 
+---------------------------------------------------------------------+
| Diagnosis                                                           |
+---------------------------------------------------------------------+
|   Reflux - Primary  Esophageal reflux                               |
+---------------------------------------------------------------------+
|   History of colonic polyps  Personal history of colonic polyps     |
+---------------------------------------------------------------------+
|   Change in bowel habits  Other symptoms involving digestive system |
+---------------------------------------------------------------------+
 documented in this encounter

## 2020-09-21 NOTE — XMS
Encounter Summary
  Created on: 2020
 
 SayraSusana
 External Reference #: [id number]
 : 48
 Sex: Female
 
 Demographics
 
 
+-----------------------+---------------------------+
| Address               | [address]            |
|                       | [place]  [address] |
+-----------------------+---------------------------+
| Home Phone            | [phone]           |
+-----------------------+---------------------------+
| Preferred Language    | Unknown                   |
+-----------------------+---------------------------+
| Marital Status        |                    |
+-----------------------+---------------------------+
| [employer] Affiliation | 1073                      |
+-----------------------+---------------------------+
| Race                  | White                     |
+-----------------------+---------------------------+
| Ethnic Group          | Not  or     |
+-----------------------+---------------------------+
 
 
 Author
 
 
+--------------+--------------------------------------------+
| Author       | [facility]  |
|              | and [place]                                |
+--------------+--------------------------------------------+
| Organization | [facility]  |
|              | and [place]                                |
+--------------+--------------------------------------------+
| Address      | Unknown                                    |
+--------------+--------------------------------------------+
| Phone        | Unavailable                                |
+--------------+--------------------------------------------+
 
 
 
 Support
 
 
+-------------+--------------+-------------------+-----------------+
| Name        | Relationship | Address           | Phone           |
+-------------+--------------+-------------------+-----------------+
| Eduar Colbert | ECON         | [address]       | [phone] |
|             |              | [place]   |                 |
|             |              | [address]             |                 |
+-------------+--------------+-------------------+-----------------+
 
 
 
 
 Care Team Providers
 
 
+------------------------+------+-----------------+
| Care Team Member Name  | Role | Phone           |
+------------------------+------+-----------------+
| Álvaro Cisse MD | PCP  | [phone] |
+------------------------+------+-----------------+
 
 
 
 Reason for Visit
 
 
+--------+----------+
| Reason | Comments |
+--------+----------+
| Other  | [facility]     |
+--------+----------+
 Evaluate & Treat (Routine)
 
+------------+--------+-----------+--------------+--------------+---------------+
| Status     | Reason | Specialty | Diagnoses /  | Referred By  | Referred To   |
|            |        |           | Procedures   | Contact      | Contact       |
+------------+--------+-----------+--------------+--------------+---------------+
| Authorized |        | Neurology |   Diagnoses  |   Tanna,  |   Jamin Brooks,   |
|            |        |           |  Other       | Álvaro MCGOWAN,   | MD  [address]      |
|            |        |           | amnesia      | MD  [address]  | [place]      |
|            |        |           | Other        | [address]  | [address]  [address]  |
|            |        |           | specified    |  [place]  | [place], |
|            |        |           | anxiety      | [place] [address]     |  [place] [address]     |
|            |        |           | disorders    | Phone:       | Phone:        |
|            |        |           |              | [phone] | [phone]  |
|            |        |           |              |   Fax:       |  Fax:         |
|            |        |           |              | [phone] | [phone]  |
+------------+--------+-----------+--------------+--------------+---------------+
 
 
 
 
 Encounter Details
 
 
+--------+-----------+---------------------+----------------------+----------------------+
| Date   | Type      | Department          | Care Team            | Description          |
+--------+-----------+---------------------+----------------------+----------------------+
| / | Clinical  |   [facility]     |   Jamin Brooks MD  [address] | Memory loss (Primary |
| 2020   | Support   | NEUROLOGY  [address]     |  [address]      |  Dx)                 |
|        |           | [place]   | [address]  [place],  |                      |
|        |           | [place], [place]        | [place] [address]             |                      |
|        |           | [address]          | [phone]         |                      |
|        |           | [phone]        | [phone] (Fax)   |                      |
+--------+-----------+---------------------+----------------------+----------------------+
 
 
 
 Social History
 
 
 
+---------------+------------+-----------+--------+------------------+
| Tobacco Use   | Types      | Packs/Day | Years  | Date             |
|               |            |           | Used   |                  |
+---------------+------------+-----------+--------+------------------+
| Former Smoker | Cigarettes | 1.5       | 5      | Quit: 1979 |
+---------------+------------+-----------+--------+------------------+
 
 
 
+---------------------+---+---+---+
| Smokeless Tobacco:  |   |   |   |
| Never Used          |   |   |   |
+---------------------+---+---+---+
 
 
 
+-------------+----------------------+---------+------------------+
| Alcohol Use | Drinks/Week          | oz/Week | Comments         |
+-------------+----------------------+---------+------------------+
| Yes         |   1 Shots of liquor  | 1.0     | "social drinker" |
|             |  0 Standard drinks   |         |                  |
|             | or equivalent        |         |                  |
+-------------+----------------------+---------+------------------+
 
 
 
+------------------+---------------+
| Sex Assigned at  | Date Recorded |
| Birth            |               |
+------------------+---------------+
| Not on file      |               |
+------------------+---------------+
 documented as of this encounter
 
 Progress Notes
 Wil Hughes, Medical Assistant - 2020  9:30 AM PDT
 Dimas's Cognitive Examination (ACE) Modified for [facility] ([facility]) 
 The patient returns today for detailed cognitive evaluation for memory difficulty. 
 Behavior Observations: 
 The patient seemed alert (choose from the following: alert, engaged and attentive, drowsy. 
Add any other observations) during the examination. 
 The examination processes was explained to the patient in detail. 
 Test Procedure: 
 The patient underwent detailed cognitive function evaluation which composed of visuospatial
 testing including overlapping pentagons, wire cube and draw a clock; language test includin
g naming,comprehension (one-stage, three-stage, complex grammar), repetition (single works a
nd phrases), reading (regular and irregular) and writing; orientation; attention and concent
ration including immediate recall, calculation or spelling and memory testing including reca
ll, anterograde memory, retrograde memory and verbal fluency. 
 Test results: 
 1. Mini Mental Status Examination 
 The examination included orientation, attention/concentration, recall, language and visuosp
atial abilities. 
 The patient received a score of 24 /30. 
 2. Total. [facility] score: 80/100
 3. Orientation/Attention/Concentration 15 /18 
 4. Memory  
 5. Verbal fluency  
 6. Language  
 7. Visuospatial abilities  
 
 Total : 80 /100 
 VLOM-Ratio: 
 V + SL 
 ----------------------------------------------- = 5.57
 O + M 
 If VLOM-ratio < 2.2: Frontotemporal dementia. 
 VLOM-ratio> 3.2 AD 
  
 [facility] test with a total score of 80/100 MMSE total score of 24/30 with VLOM ration of 5.57 
 Normal cognition: 91+
 Mild cognitive impairment: 84-90 
 Mild Alzheimer's disease: 70-83 
 Moderate Alzheimer's disease: 50-69 
 Severe Alzheimer's disease: 30-49 
 End stage Alzheimer's disease: <30 
 
 Electronically signed by Wil Hughes Medical Assistant at 2020 10:49 AM Meera
mented in this encounter
 
 Plan of Treatment
 
 
+--------+---------+-----------+----------------------+-------------+
| Date   | Type    | Specialty | Care Team            | Description |
+--------+---------+-----------+----------------------+-------------+
| 10/12/ | Office  | Neurology |   Jamin Brooks MD  [address] |             |
|    | Visit   |           |  [address]      |             |
|        |         |           | [place],  |             |
|        |         |           | [place] [address]             |             |
|        |         |           | [phone]         |             |
|        |         |           | [phone] (Fax)   |             |
+--------+---------+-----------+----------------------+-------------+
 documented as of this encounter
 
 Visit Diagnoses
 
 
+-------------------------+
| Diagnosis               |
+-------------------------+
|   Memory loss - Primary |
+-------------------------+
 documented in this encounter

## 2020-09-21 NOTE — XMS
Encounter Summary
  Created on: 2020
 
 SayraSusana
 External Reference #: [id number]
 : 48
 Sex: Female
 
 Demographics
 
 
+-----------------------+---------------------------+
| Address               | [address]            |
|                       | [place]  [address] |
+-----------------------+---------------------------+
| Home Phone            | [phone]           |
+-----------------------+---------------------------+
| Preferred Language    | Unknown                   |
+-----------------------+---------------------------+
| Marital Status        |                    |
+-----------------------+---------------------------+
| [employer] Affiliation | 1073                      |
+-----------------------+---------------------------+
| Race                  | White                     |
+-----------------------+---------------------------+
| Ethnic Group          | Not  or     |
+-----------------------+---------------------------+
 
 
 Author
 
 
+--------------+--------------------------------------------+
| Author       | [facility]  |
|              | and [place]                                |
+--------------+--------------------------------------------+
| Organization | [facility]  |
|              | and [place]                                |
+--------------+--------------------------------------------+
| Address      | Unknown                                    |
+--------------+--------------------------------------------+
| Phone        | Unavailable                                |
+--------------+--------------------------------------------+
 
 
 
 Support
 
 
+-------------+--------------+-------------------+-----------------+
| Name        | Relationship | Address           | Phone           |
+-------------+--------------+-------------------+-----------------+
| Eduar Colbert | ECON         | [address]       | [phone] |
|             |              | [place]   |                 |
|             |              | [address]             |                 |
+-------------+--------------+-------------------+-----------------+
 
 
 
 
 Care Team Providers
 
 
+-------------------------+------+-----------------+
| Care Team Member Name   | Role | Phone           |
+-------------------------+------+-----------------+
| Tobin Alex MD | PCP  | [phone] |
+-------------------------+------+-----------------+
 
 
 
 Reason for Visit
 
 
+-----------+-----------------------------------------------+
| Reason    | Comments                                      |
+-----------+-----------------------------------------------+
| Follow-up | Review MRI Results done at [facility] on 2018 |
+-----------+-----------------------------------------------+
 
 
 
 Encounter Details
 
 
+--------+---------+----------------------+----------------------+----------------------+
| Date   | Type    | Department           | Care Team            | Description          |
+--------+---------+----------------------+----------------------+----------------------+
| / | Office  |   [facility]          |   Juan Carlos Howard,   | Lytic lesion of bone |
| 2018   | Visit   | ORTHOPEDIC SURGERY   | MD [address]     |  on x-ray (Primary   |
|        |         | [address] | [place] [address]      | Dx)                  |
|        |         |  [place]           | [address]  [phone]  |                      |
|        |         | [address]           |  [phone] (Fax)  |                      |
|        |         | [phone]         |                      |                      |
+--------+---------+----------------------+----------------------+----------------------+
 
 
 
 Social History
 
 
+---------------+------------+-----------+--------+------------------+
| Tobacco Use   | Types      | Packs/Day | Years  | Date             |
|               |            |           | Used   |                  |
+---------------+------------+-----------+--------+------------------+
| Former Smoker | Cigarettes | 1.5       | 5      | Quit: 1979 |
+---------------+------------+-----------+--------+------------------+
 
 
 
+---------------------+---+---+---+
| Smokeless Tobacco:  |   |   |   |
| Never Used          |   |   |   |
+---------------------+---+---+---+
 
 
 
+-------------+----------------------+---------+------------------+
| Alcohol Use | Drinks/Week          | oz/Week | Comments         |
 
+-------------+----------------------+---------+------------------+
| Yes         |   1 Shots of liquor  | 1.0     | "social drinker" |
|             |  0 Standard drinks   |         |                  |
|             | or equivalent        |         |                  |
+-------------+----------------------+---------+------------------+
 
 
 
+------------------+---------------+
| Sex Assigned at  | Date Recorded |
| Birth            |               |
+------------------+---------------+
| Not on file      |               |
+------------------+---------------+
 documented as of this encounter
 
 Last Filed Vital Signs
 
 
+-------------------+------------------+----------------------+----------+
| Vital Sign        | Reading          | Time Taken           | Comments |
+-------------------+------------------+----------------------+----------+
| Blood Pressure    | -                | -                    |          |
+-------------------+------------------+----------------------+----------+
| Pulse             | -                | -                    |          |
+-------------------+------------------+----------------------+----------+
| Temperature       | -                | -                    |          |
+-------------------+------------------+----------------------+----------+
| Respiratory Rate  | -                | -                    |          |
+-------------------+------------------+----------------------+----------+
| Oxygen Saturation | -                | -                    |          |
+-------------------+------------------+----------------------+----------+
| Inhaled Oxygen    | -                | -                    |          |
| Concentration     |                  |                      |          |
+-------------------+------------------+----------------------+----------+
| Weight            | 64 kg (141 lb)   | 2018 11:16 AM  |          |
|                   |                  | PDT                  |          |
+-------------------+------------------+----------------------+----------+
| Height            | 170.2 cm (5' 7") | 2018 11:16 AM  |          |
|                   |                  | PDT                  |          |
+-------------------+------------------+----------------------+----------+
| Body Mass Index   | 22.08            | 2018 11:16 AM  |          |
|                   |                  | PDT                  |          |
+-------------------+------------------+----------------------+----------+
 documented in this encounter
 
 Progress Notes
 Juan Carlos Howard MD - 2018 11:15 AM PDTPatient returns with her  today to disc
uss the results of her MRI right knee
 I had a telephone discussion with her after the MRI was obtained and I had recommended a colt
ne scan
 Her  states that they know a radiologist in [place] who didn't think that the tibi
a bone lesion was concerning and reportedly had another radiologist look at the images and t
hey felt it was a simple benign bone cyst.
 They are reluctant to want to pursue any other workup and feel that what I am telling them 
from our radiologist is a major discrepancy from their own radiologist
 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
thritis, but that no one can say with 100 percent certainty
 On the MRI the lesion is large and lytic and has edema surrounding it and our radiologist t
hought it was outside of the typical appearance for bone cyst and that the other possibiliti
 
es be considered
 I explained that every single test or procedure even biopsy to evaluate this is fraught wit
h less than 100 percent sensitivity and specificity
 If this represents either multiple myeloma or metastatic disease then a bone scan will help
 evaluate for other lesions
 If this is a solitary lesion than I agree that it is most likely a benign cyst 
 They understand and agree to proceed with bone scan
 Electronically signed by Juan Carlos Howard MD at 2018  3:18 PM PDTdocumented in this en
counter
 
 Plan of Treatment
 
 
+--------+---------+-----------+----------------------+-------------+
| Date   | Type    | Specialty | Care Team            | Description |
+--------+---------+-----------+----------------------+-------------+
| 10/12/ | Office  | Neurology |   Jamin Brooks MD  [address] |             |
|    | Visit   |           |  [address]      |             |
|        |         |           | [address]  [place],  |             |
|        |         |           | [place] [address]             |             |
|        |         |           | [phone]         |             |
|        |         |           | [phone] (Fax)   |             |
+--------+---------+-----------+----------------------+-------------+
 documented as of this encounter
 
 Visit Diagnoses
 
 
+----------------------------------------------------------------------------------------+
| Diagnosis                                                                              |
+----------------------------------------------------------------------------------------+
|   Lytic lesion of bone on x-ray - Primary  Disorder of bone and cartilage, unspecified |
+----------------------------------------------------------------------------------------+
 documented in this encounter

## 2020-09-21 NOTE — XMS
Encounter Summary
  Created on: 2020
 
 SayraSusana
 External Reference #: [id number]
 : 48
 Sex: Female
 
 Demographics
 
 
+-----------------------+---------------------------+
| Address               | [address]            |
|                       | [place]  [address] |
+-----------------------+---------------------------+
| Home Phone            | [phone]           |
+-----------------------+---------------------------+
| Preferred Language    | Unknown                   |
+-----------------------+---------------------------+
| Marital Status        |                    |
+-----------------------+---------------------------+
| Lutheran Affiliation | 1073                      |
+-----------------------+---------------------------+
| Race                  | White                     |
+-----------------------+---------------------------+
| Ethnic Group          | Not  or     |
+-----------------------+---------------------------+
 
 
 Author
 
 
+--------------+--------------------------------------------+
| Author       | [facility]  |
|              | and [place]                                |
+--------------+--------------------------------------------+
| Organization | [facility]  |
|              | and [place]                                |
+--------------+--------------------------------------------+
| Address      | Unknown                                    |
+--------------+--------------------------------------------+
| Phone        | Unavailable                                |
+--------------+--------------------------------------------+
 
 
 
 Support
 
 
+-------------+--------------+-------------------+-----------------+
| Name        | Relationship | Address           | Phone           |
+-------------+--------------+-------------------+-----------------+
| Eduar Colbert | ECON         | [address]       | [phone] |
|             |              | [place]   |                 |
|             |              | [address]             |                 |
+-------------+--------------+-------------------+-----------------+
 
 
 
 
 Care Team Providers
 
 
+------------------------+------+-----------------+
| Care Team Member Name  | Role | Phone           |
+------------------------+------+-----------------+
| Álvaro Cisse MD | PCP  | [phone] |
+------------------------+------+-----------------+
 
 
 
 Reason for Visit
 
 
+-----------+-------------+
| Reason    | Comments    |
+-----------+-------------+
| Follow-up | Memory loss |
+-----------+-------------+
 Evaluate & Treat (Routine)
 
+------------+--------+-----------+--------------+--------------+---------------+
| Status     | Reason | Specialty | Diagnoses /  | Referred By  | Referred To   |
|            |        |           | Procedures   | Contact      | Contact       |
+------------+--------+-----------+--------------+--------------+---------------+
| Authorized |        | Neurology |   Diagnoses  |   Tanna,  |   Jamin Brooks,   |
|            |        |           |  Other       | Álvaro MCGOWAN,   | MD  [address]      |
|            |        |           | amnesia      | MD  [address]  | [place]      |
|            |        |           | Other        | [address]  | [address]  [address]  |
|            |        |           | specified    |  [place]  | [place], |
|            |        |           | anxiety      | [place] [address]     |  [place] [address]     |
|            |        |           | disorders    | Phone:       | Phone:        |
|            |        |           |              | [phone] | [phone]  |
|            |        |           |              |   Fax:       |  Fax:         |
|            |        |           |              | [phone] | [phone]  |
+------------+--------+-----------+--------------+--------------+---------------+
 
 
 
 
 Encounter Details
 
 
+--------+---------+---------------------+----------------------+----------------------+
| Date   | Type    | Department          | Care Team            | Description          |
+--------+---------+---------------------+----------------------+----------------------+
| / | Office  |   [facility]     |   Jamin Broosk MD  [address] | Late onset           |
| 2020   | Visit   | NEUROLOGY  [address]     |  [address]      | Alzheimer's disease  |
|        |         | [place]   | [address]  [place],  | without behavioral   |
|        |         | [place], [place]        | [place] [address]             | disturbance (HCC)    |
|        |         | [address]          | [phone]         | (Primary Dx)         |
|        |         | [phone]        | [phone] (Fax)   |                      |
+--------+---------+---------------------+----------------------+----------------------+
 
 
 
 Social History
 
 
 
+---------------+------------+-----------+--------+------------------+
| Tobacco Use   | Types      | Packs/Day | Years  | Date             |
|               |            |           | Used   |                  |
+---------------+------------+-----------+--------+------------------+
| Former Smoker | Cigarettes | 1.5       | 5      | Quit: 1979 |
+---------------+------------+-----------+--------+------------------+
 
 
 
+---------------------+---+---+---+
| Smokeless Tobacco:  |   |   |   |
| Never Used          |   |   |   |
+---------------------+---+---+---+
 
 
 
+-------------+----------------------+---------+------------------+
| Alcohol Use | Drinks/Week          | oz/Week | Comments         |
+-------------+----------------------+---------+------------------+
| Yes         |   1 Shots of liquor  | 1.0     | "social drinker" |
|             |  0 Standard drinks   |         |                  |
|             | or equivalent        |         |                  |
+-------------+----------------------+---------+------------------+
 
 
 
+------------------+---------------+
| Sex Assigned at  | Date Recorded |
| Birth            |               |
+------------------+---------------+
| Not on file      |               |
+------------------+---------------+
 documented as of this encounter
 
 Last Filed Vital Signs
 
 
+-------------------+------------------+----------------------+----------+
| Vital Sign        | Reading          | Time Taken           | Comments |
+-------------------+------------------+----------------------+----------+
| Blood Pressure    | 145/77           | 2020 10:05 AM  |          |
|                   |                  | PDT                  |          |
+-------------------+------------------+----------------------+----------+
| Pulse             | 50               | 2020 10:05 AM  |          |
|                   |                  | PDT                  |          |
+-------------------+------------------+----------------------+----------+
| Temperature       | -                | -                    |          |
+-------------------+------------------+----------------------+----------+
| Respiratory Rate  | -                | -                    |          |
+-------------------+------------------+----------------------+----------+
| Oxygen Saturation | 100%             | 2020 10:05 AM  |          |
|                   |                  | PDT                  |          |
+-------------------+------------------+----------------------+----------+
| Inhaled Oxygen    | -                | -                    |          |
| Concentration     |                  |                      |          |
+-------------------+------------------+----------------------+----------+
| Weight            | 62.1 kg (137 lb) | 2020 10:05 AM  |          |
|                   |                  | PDT                  |          |
+-------------------+------------------+----------------------+----------+
| Height            | 167.6 cm (5' 6") | 2020 10:05 AM  |          |
 
|                   |                  | PDT                  |          |
+-------------------+------------------+----------------------+----------+
| Body Mass Index   | 22.11            | 2020 10:05 AM  |          |
|                   |                  | PDT                  |          |
+-------------------+------------------+----------------------+----------+
 documented in this encounter
 
 Progress Notes
 Jamin Brooks MD - 2020 10:10 AM PDTFormatting of this note might be different from the o
riginal.
 Referring Physician: Álvaro Cisse MD 
 PCP: Álvaro Cisse MD 
 Date of Encounter: 2020 
 
 SUBJECTIVE
 Susana Colbert is a pleasant 70 y.o. female with history of depression, A. fib stat
us post ablation, who presents to the clinic today for evaluation of memory loss.  Patient w
as accompanied by her .
 Interval History
 Patient reports stable symptoms since last visit. She just completed WACE today. 
 WACE today 2020 80/100, MMSE 24/30, VLOM ratio 5.57
 Labs showed normal TSH, 19
 
 Recap History
 Patient reports memory loss for 1 year or longer.  Short term memory is primarily affected.
  Patient would forget things that was just told to her.  She would repeatedly ask the same 
question.  She forgets appointments.  She misplaces personal items.  At one time, she starte
d a meal, got distracted, and then when she returned, she started to cook something complete
ly different.  She denies difficulty driving, recent traffic tickets or accidents.  She take
s medication by herself, but occasionally needs reminder from .  She recently was wor
vivian with primary doctor to taper her Celexa, she had trouble knowing how to follow the doct
ors instructions.   is concerned. 
 She has no significant difficulty handling daily activity at this time. 
 She reports good balance, she denies involuntary movement, denies bladder incontinence. 
 Her mother was diagnosed with Alzheimer disease in her early 60s, maternal grandmother had 
dementia in her 80s. 
 Patient has 30-year history of depression, was on several different antidepressant.  She fe
els her depression is under control at this time.  She is in the process of weaning Celexa.
  
 Labs CBC, CMP, RPR unremarkable. 
 CT head 2019, [facility]: Minimal atherosclerotic disease.  Otherwise normal appearanc
e of the brain.  
 
 Highest education: Associate degree
 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
 
 ALLERGIES
 Allergies 
 Allergen Reactions 
   Moexipril Hydrochloride Shortness Of Breath 
   AKA Univasc 
   Metoclopramide Other (See Comments) 
   depression 
   Oxycodone Other (See Comments) 
   "makes her crazy" 
   Propafenone Other (See Comments) 
   Did not work for her Afib, she wants this on her allergy list 
  
 MEDICATIONS
 
 
 Current Outpatient Medications: 
    Calcium-Magnesium-Vitamin D (CITRACAL SLOW RELEASE) 600- MG-MG-UNIT TB24, Take 1
,500 mg by mouth., Disp: , Rfl: 
    cholecalciferol (VITAMIN D-3) 400 units TABS, Take 400 Units by mouth Daily., Disp: , 
Rfl: 
    citalopram (CELEXA) 20 mg tablet, Take 35 mg by mouth Daily ., Disp: , Rfl: 
    ipratropium (ATROVENT) 0.06% nasal spray, INSTILL TWO SPRAYS IN EACH NOSTRIL UP TO FOU
R TIMES DAILY AS NEEDED, Disp: 15 mL, Rfl: 5
    Polyethylene Glycol 400 (BLINK TEARS OP), Apply  to eye Daily as needed., Disp: , Rfl:
  
 
 Family history, social history and past medical history were reviewed and updated as necess
brenda.
 
 REVIEW OF SYSTEMS
 In addition to HPI, a comprehensive ROS also revealed:
 Negative except noted in HPI 
 
 OBJECTIVE
 General Exam
 Vitals: 
  20 1005 
 BP: 145/77 
 Pulse: 50 
 PainSc:   0 - No pain 
  
 WDWN, NAD
 Heart is regular 
 Awake, alert. Cooperative and appropriate during the encounter. Speech is clear, fluent and
 coherent. 
 
 DATA
 Results for orders placed or performed in visit on 20 
 ECG 12 lead 
 Result Value Ref Range 
  VENTRICULAR RATE EKG 51 BPM 
  ATRIAL RATE 51 BPM 
  P-R INTERVAL 194 ms 
  QRS DURATION 88 ms 
  Q-T INTERVAL 444 ms 
  Q-T INTERVAL (CORRECTED) 409 ms 
  P WAVE AXIS 87 degrees 
  QRS AXIS 100 degrees 
  T AXIS 61 degrees 
  INTERPRETATION TEXT   
   Sinus bradycardia
 Septal infarct , age undetermined
 Abnormal ECG
 No previous ECGs available
 Confirmed by BONITA VINCENT MD ([id number]) on 2020 7:54:30 AM
  
  
 
 ASSESSMENT & PLAN
 
 70 y.o. female female who presents with more than 1 year history of short-term memory loss.
 Recent head CT was unremarkable.  WACE in mild demential range, VLOM ratio suggest Alzheime
r type dementia. 
 Reviewed WACE results. We might be dealing with early Alzheimer dementia. Patient is intere
sted to try donepezil for symptoms measures.
 
 - start donepezil  5 mg daily. Potential side effects were reviewed with the patient in det
ail. Realistic expectation reviewed. 
 - Discussed cognitive health issues. Advised to stay mentally and physically active. Regula
r physical exercises was encouraged, as patient can tolerate. Follow with primary doctor to 
continue optimize management of depression.
 - discussed cognitive safety issues. Family to help monitor medication and driving safety p
eriodically
 - Follow up in 4-6 months or sooner as needed
 
 Greater than 50% of this 25 minute encounter was spent on face to face counseling and formu
lation of management plan.
 
 Thank you for allowing me to take care of this patient. Please do not hesitate to contact m
nemo if you have any questions.
 Cc:
 Álvaro Cisse MD 
 
 This note was prepared using Dragon dictation voice recognition technology. There may be so
und-alike errors even though every effort has been made to ensure accuracy. 
 Electronically signed by Jamin Brooks MD at 2020  3:06 PM PDTdocumented in this encounter
 
 Plan of Treatment
 
 
+--------+---------+-----------+----------------------+-------------+
| Date   | Type    | Specialty | Care Team            | Description |
+--------+---------+-----------+----------------------+-------------+
| 10/12/ | Office  | Neurology |   Jamin Brooks MD  [address] |             |
2020   | Visit   |           |  [employer]      |             |
|        |         |           | [address]  [facility]  |             |
|        |         |           | [place] [address]             |             |
|        |         |           | [phone]         |             |
|        |         |           | [phone] (Fax)   |             |
+--------+---------+-----------+----------------------+-------------+
 documented as of this encounter
 
 Visit Diagnoses
 
 
+---------------------------------------------------------------------------------+
| Diagnosis                                                                       |
+---------------------------------------------------------------------------------+
|   Late onset Alzheimer's disease without behavioral disturbance (HCC) - Primary |
+---------------------------------------------------------------------------------+
 documented in this encounter

## 2020-09-21 NOTE — XMS
Encounter Summary
  Created on: 2020
 
 SayraSusana
 External Reference #: [id number]
 : 48
 Sex: Female
 
 Demographics
 
 
+-----------------------+---------------------------+
| Address               | [address]            |
|                       | [place]  [address] |
+-----------------------+---------------------------+
| Home Phone            | [phone]           |
+-----------------------+---------------------------+
| Preferred Language    | Unknown                   |
+-----------------------+---------------------------+
| Marital Status        |                    |
+-----------------------+---------------------------+
| Pentecostal Affiliation | 1073                      |
+-----------------------+---------------------------+
| Race                  | White                     |
+-----------------------+---------------------------+
| Ethnic Group          | Not  or     |
+-----------------------+---------------------------+
 
 
 Author
 
 
+--------------+--------------------------------------------+
| Author       | [facility]  |
|              | and [place]                                |
+--------------+--------------------------------------------+
| Organization | [facility]  |
|              | and [place]                                |
+--------------+--------------------------------------------+
| Address      | Unknown                                    |
+--------------+--------------------------------------------+
| Phone        | Unavailable                                |
+--------------+--------------------------------------------+
 
 
 
 Support
 
 
+-------------+--------------+-------------------+-----------------+
| Name        | Relationship | Address           | Phone           |
+-------------+--------------+-------------------+-----------------+
| Eduar Colbert | ECON         | [address]       | [phone] |
|             |              | [place]   |                 |
|             |              | [address]             |                 |
+-------------+--------------+-------------------+-----------------+
 
 
 
 
 Care Team Providers
 
 
+-------------------------+------+-----------------+
| Care Team Member Name   | Role | Phone           |
+-------------------------+------+-----------------+
| Tobin Alex MD | PCP  | [phone] |
+-------------------------+------+-----------------+
 
 
 
 Reason for Visit
 
 
+----------+--------+----------+
| Reason   | Onset  | Comments |
|          | Date   |          |
+----------+--------+----------+
| Diarrhea | / |          |
|          |    |          |
+----------+--------+----------+
 
 
 
 Encounter Details
 
 
+--------+-----------+----------------------+----------------------+-------------+
| Date   | Type      | Department           | Care Team            | Description |
+--------+-----------+----------------------+----------------------+-------------+
| / | Telephone |   [facility]          |   Mahin Leiva MD | Diarrhea    |
| 2017   |           | GASTROENTEROLOGY     |   [address] [place]  |             |
|        |           | [address] [place]  | [address]  [place], [place] |             |
|        |           | [address]  [place], [place] |  [address]  [phone] |             |
|        |           |  [address]          |   [phone] (Fax) |             |
|        |           | [phone]         |                      |             |
+--------+-----------+----------------------+----------------------+-------------+
 
 
 
 Social History
 
 
+---------------+------------+-----------+--------+------------------+
| Tobacco Use   | Types      | Packs/Day | Years  | Date             |
|               |            |           | Used   |                  |
+---------------+------------+-----------+--------+------------------+
| Former Smoker | Cigarettes | 1.5       | 5      | Quit: 1979 |
+---------------+------------+-----------+--------+------------------+
 
 
 
+---------------------+---+---+---+
| Smokeless Tobacco:  |   |   |   |
| Never Used          |   |   |   |
+---------------------+---+---+---+
 
 
 
 
+-------------+----------------------+---------+------------------+
| Alcohol Use | Drinks/Week          | oz/Week | Comments         |
+-------------+----------------------+---------+------------------+
| Yes         |   1 Shots of liquor  | 1.0     | "social drinker" |
|             |  0 Standard drinks   |         |                  |
|             | or equivalent        |         |                  |
+-------------+----------------------+---------+------------------+
 
 
 
+------------------+---------------+
| Sex Assigned at  | Date Recorded |
| Birth            |               |
+------------------+---------------+
| Not on file      |               |
+------------------+---------------+
 documented as of this encounter
 
 Miscellaneous Notes
 Telephone Encounter - Nusrat Bustillos RN - 2017  3:50 PM PDTLeft message for zach guzman that Dr. Leiva asking for update.  Any further diarrhea?Electronically signed by Rodriguez Bustillos RN at 2017  3:50 PM PDTdocumented in this encounter
 
 Plan of Treatment
 
 
+--------+---------+-----------+----------------------+-------------+
| Date   | Type    | Specialty | Care Team            | Description |
+--------+---------+-----------+----------------------+-------------+
| 10/12/ | Office  | Neurology |   Jamin Brooks MD  [address] |             |
|    | Visit   |           |  [address]      |             |
|        |         |           | [place],  |             |
|        |         |           | [place] [address]             |             |
|        |         |           | [phone]         |             |
|        |         |           | [phone] (Fax)   |             |
+--------+---------+-----------+----------------------+-------------+
 documented as of this encounter
 
 Visit Diagnoses
 Not on filedocumented in this encounter
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
## 2020-09-21 NOTE — XMS
Encounter Summary
  Created on: 2020
 
 SayraSusana
 External Reference #: [id number]
 : 48
 Sex: Female
 
 Demographics
 
 
+-----------------------+---------------------------+
| Address               | [address]            |
|                       | [place]  [address] |
+-----------------------+---------------------------+
| Home Phone            | [phone]           |
+-----------------------+---------------------------+
| Preferred Language    | Unknown                   |
+-----------------------+---------------------------+
| Marital Status        |                    |
+-----------------------+---------------------------+
| Restoration Affiliation | [id number]                      |
+-----------------------+---------------------------+
| Race                  | White                     |
+-----------------------+---------------------------+
| Ethnic Group          | Not  or     |
+-----------------------+---------------------------+
 
 
 Author
 
 
+--------------+--------------------------------------------+
| Author       | [facility] [place]  |
|              | and [place]                                |
+--------------+--------------------------------------------+
| Organization | [facility]  |
|              | and [place]                                |
+--------------+--------------------------------------------+
| Address      | Unknown                                    |
+--------------+--------------------------------------------+
| Phone        | Unavailable                                |
+--------------+--------------------------------------------+
 
 
 
 Support
 
 
+-------------+--------------+-------------------+-----------------+
| Name        | Relationship | Address           | Phone           |
+-------------+--------------+-------------------+-----------------+
| Eduar Colbert | ECON         | [address]       | [phone] |
|             |              | [place]   |                 |
|             |              | [address]             |                 |
+-------------+--------------+-------------------+-----------------+
 
 
 
 
 Care Team Providers
 
 
+-----------------------+------+-------------+
| Care Team Member Name | Role | Phone       |
+-----------------------+------+-------------+
 PCP  | Unavailable |
+-----------------------+------+-------------+
 
 
 
 Encounter Details
 
 
+--------+-----------+----------------------+----------------------+-------------+
| Date   | Type      | Department           | Care Team            | Description |
+--------+-----------+----------------------+----------------------+-------------+
| / | [facility]  |   [facility] |   Mahin Leiva MD |             |
|    | Encounter |  MED CTR GENERIC OP  |   [address] [place]  |             |
|        |           | CONV DEPT  [address]     | [address]  [place], [place] |             |
|        |           | [place], |  [address]  [phone] |             |
|        |           |  [place] [address]       |   [phone] (Fax) |             |
|        |           | [phone]         |                      |             |
+--------+-----------+----------------------+----------------------+-------------+
 
 
 
 Social History
 
 
+----------------+-------+-----------+--------+------+
| Tobacco Use    | Types | Packs/Day | Years  | Date |
|                |       |           | Used   |      |
+----------------+-------+-----------+--------+------+
| Never Assessed |       |           |        |      |
+----------------+-------+-----------+--------+------+
 
 
 
+------------------+---------------+
| Sex Assigned at  | Date Recorded |
| Birth            |               |
+------------------+---------------+
| Not on file      |               |
+------------------+---------------+
 documented as of this encounter
 
 Plan of Treatment
 
 
+--------+---------+-----------+----------------------+-------------+
| Date   | Type    | Specialty | Care Team            | Description |
+--------+---------+-----------+----------------------+-------------+
| 10/12/ | Office  | Neurology |   Jamin Brooks MD  [address] |             |
| 2020   | Visit   |           |  [address]      |             |
|        |         |           | [address]  [place]  |             |
|        |         |           | [place] [address]             |             |
|        |         |           | [phone]         |             |
|        |         |           | [phone] (Fax)   |             |
 
+--------+---------+-----------+----------------------+-------------+
 documented as of this encounter
 
 Visit Diagnoses
 Not on filedocumented in this encounter

## 2020-09-21 NOTE — XMS
Encounter Summary
  Created on: 2020
 
 SayraSusana
 External Reference #: [id number]
 : 48
 Sex: Female
 
 Demographics
 
 
+-----------------------+---------------------------+
| Address               | [address]            |
|                       | [place]  [address] |
+-----------------------+---------------------------+
| Home Phone            | [phone]           |
+-----------------------+---------------------------+
| Preferred Language    | Unknown                   |
+-----------------------+---------------------------+
| Marital Status        |                    |
+-----------------------+---------------------------+
| Yazidism Affiliation | 1073                      |
+-----------------------+---------------------------+
| Race                  | White                     |
+-----------------------+---------------------------+
| Ethnic Group          | Not  or     |
+-----------------------+---------------------------+
 
 
 Author
 
 
+--------------+--------------------------------------------+
| Author       | [facility] [place]  |
|              | and [place]                                |
+--------------+--------------------------------------------+
| Organization | [facility] [place]  |
|              | and [place]                                |
+--------------+--------------------------------------------+
| Address      | Unknown                                    |
+--------------+--------------------------------------------+
| Phone        | Unavailable                                |
+--------------+--------------------------------------------+
 
 
 
 Support
 
 
+-------------+--------------+-------------------+-----------------+
| Name        | Relationship | Address           | Phone           |
+-------------+--------------+-------------------+-----------------+
| Eduar Colbert | ECON         | [address]       | [phone] |
|             |              | [place]   |                 |
|             |              | [address]             |                 |
+-------------+--------------+-------------------+-----------------+
 
 
 
 
 Care Team Providers
 
 
+-----------------------+------+-------------+
| Care Team Member Name | Role | Phone       |
+-----------------------+------+-------------+
 PCP  | Unavailable |
+-----------------------+------+-------------+
 
 
 
 Encounter Details
 
 
+--------+-----------+----------------------+-----------+-------------+
| Date   | Type      | Department           | Care Team | Description |
+--------+-----------+----------------------+-----------+-------------+
| / | Hospital  |   [facility] |           |             |
|    | Encounter |  MED CTR XRAY  [address] |           |             |
|        |           |  [address]  [place]       |           |             |
|        |           | [place] [address] |           |             |
|        |           |   [phone]       |           |             |
+--------+-----------+----------------------+-----------+-------------+
 
 
 
 Social History
 
 
+----------------+-------+-----------+--------+------+
| Tobacco Use    | Types | Packs/Day | Years  | Date |
|                |       |           | Used   |      |
+----------------+-------+-----------+--------+------+
| Never Assessed |       |           |        |      |
+----------------+-------+-----------+--------+------+
 
 
 
+------------------+---------------+
| Sex Assigned at  | Date Recorded |
| Birth            |               |
+------------------+---------------+
| Not on file      |               |
+------------------+---------------+
 documented as of this encounter
 
 Plan of Treatment
 
 
+--------+---------+-----------+----------------------+-------------+
| Date   | Type    | Specialty | Care Team            | Description |
+--------+---------+-----------+----------------------+-------------+
| 10/12/ | Office  | Neurology |   Jamin Brokos MD  [address] |             |
| 2020   | Visit   |           |  [address]      |             |
|        |         |           | [place]  |             |
|        |         |           | [place] [address]             |             |
|        |         |           | [phone]         |             |
|        |         |           | [phone] (Fax)   |             |
+--------+---------+-----------+----------------------+-------------+
 
 documented as of this encounter
 
 Visit Diagnoses
 Not on filedocumented in this encounter

## 2020-09-21 NOTE — XMS
Encounter Summary
  Created on: 2020
 
 SayraSusana
 External Reference #: [id number]
 : 48
 Sex: Female
 
 Demographics
 
 
+-----------------------+---------------------------+
| Address               | [address]            |
|                       | [place]  [address] |
+-----------------------+---------------------------+
| Home Phone            | [phone]           |
+-----------------------+---------------------------+
| Preferred Language    | Unknown                   |
+-----------------------+---------------------------+
| Marital Status        |                    |
+-----------------------+---------------------------+
| Mu-ism Affiliation | 1073                      |
+-----------------------+---------------------------+
| Race                  | White                     |
+-----------------------+---------------------------+
| Ethnic Group          | Not  or     |
+-----------------------+---------------------------+
 
 
 Author
 
 
+--------------+--------------------------------------------+
| Author       | [facility] [place]  |
|              | and [place]                                |
+--------------+--------------------------------------------+
| Organization | [facility] [place]  |
|              | and [place]                                |
+--------------+--------------------------------------------+
| Address      | Unknown                                    |
+--------------+--------------------------------------------+
| Phone        | Unavailable                                |
+--------------+--------------------------------------------+
 
 
 
 Support
 
 
+-------------+--------------+-------------------+-----------------+
| Name        | Relationship | Address           | Phone           |
+-------------+--------------+-------------------+-----------------+
| Eduar Colbert | ECON         | [address]       | [phone] |
|             |              | [place]   |                 |
|             |              | [address]             |                 |
+-------------+--------------+-------------------+-----------------+
 
 
 
 
 Care Team Providers
 
 
+-------------------------+------+-----------------+
| Care Team Member Name   | Role | Phone           |
+-------------------------+------+-----------------+
| Tobin Alex MD | PCP  | [phone] |
+-------------------------+------+-----------------+
 
 
 
 Reason for Visit
 
 
+-------------------+--------+----------+
| Reason            | Onset  | Comments |
|                   | Date   |          |
+-------------------+--------+----------+
| Medication Refill | 12/15/ |          |
|                   |    |          |
+-------------------+--------+----------+
 
 
 
 Encounter Details
 
 
+--------+-----------+----------------------+----------------------+-------------------+
| Date   | Type      | Department           | Care Team            | Description       |
+--------+-----------+----------------------+----------------------+-------------------+
| 12/15/ | Telephone |   [facility]          |   Pablito Nava MD  | Medication Refill |
|    |           | OTOLARYNGOLOGY  [address]  |  [address] [place] [address] |                   |
|        |           | [address] [facility] [address]  |  [address]  [place]  |                   |
|        |           |  [place]     | [id number] [phone]  |                   |
|        |           | [address]           |  [phone] (Fax)  |                   |
|        |           | [phone]         |                      |                   |
+--------+-----------+----------------------+----------------------+-------------------+
 
 
 
 Social History
 
 
+---------------+-------+-----------+--------+------+
| Tobacco Use   | Types | Packs/Day | Years  | Date |
|               |       |           | Used   |      |
+---------------+-------+-----------+--------+------+
| Former Smoker |       |           |        |      |
+---------------+-------+-----------+--------+------+
 
 
 
+------------------------+
| Comments: 10 years ago |
+------------------------+
 
 
 
+-------------+-------------+---------+----------+
 
| Alcohol Use | Drinks/Week | oz/Week | Comments |
+-------------+-------------+---------+----------+
| Not Asked   |             |         |          |
+-------------+-------------+---------+----------+
 
 
 
+------------------+---------------+
| Sex Assigned at  | Date Recorded |
| Birth            |               |
+------------------+---------------+
| Not on file      |               |
+------------------+---------------+
 documented as of this encounter
 
 Miscellaneous Notes
 Telephone Encounter - Danay Pickens RN - 12/15/2014  5:00 PM PSTCalled atrovent n
otilio spray into [facility] in [place] per Dr Nava. Left message to notify patient.Electro
nically signed by Danay Pickens RN at 12/15/2014  5:04 PM PSTTelephone Encounter - 
Pablito Nava MD - 12/15/2014  4:55 PM PSTCall in atrovent nasal spray 0.06% Sig 2 sprays t
o nose each side 1-4 times daily prn runny nose. #1 with 3 refillsElectronically signed by ODIN Nava MD at 12/15/2014  4:57 PM PSTTelephone Encounter - Danay Pickens RN -
 12/15/2014  3:20 PM PSTYou prescribed Atrovent. Directions, amount, etc. Please.Electronica
lly signed by Danay Pickens RN at 12/15/2014  3:21 PM PSTTelephone Encounter - Dolores Sweeney - 12/15/2014  2:50 PM PSTPatient is calling because she was seen on 14 and p
rescription for inhaler was supposed to be called into [facility] in [place] [phone] 
and she states they never received anything. Please call Electronically signed by Dolores girard at 12/15/2014  2:52 PM PSTdocumented in this encounter
 
 Plan of Treatment
 
 
+--------+---------+-----------+----------------------+-------------+
| Date   | Type    | Specialty | Care Team            | Description |
+--------+---------+-----------+----------------------+-------------+
| 10/12/ | Office  | Neurology |   Jamin Brooks MD  [address] |             |
| 2020   | Visit   |           |  [facility] [address]      |             |
|        |         |           | [place],  |             |
|        |         |           | [place] [address]             |             |
|        |         |           | [phone]         |             |
|        |         |           | [phone] (Fax)   |             |
+--------+---------+-----------+----------------------+-------------+
 documented as of this encounter
 
 Visit Diagnoses
 Not on filedocumented in this encounter

## 2020-09-21 NOTE — XMS
Encounter Summary
  Created on: 2020
 
 SayraSusana
 External Reference #: [id number]
 : 48
 Sex: Female
 
 Demographics
 
 
+-----------------------+---------------------------+
| Address               | [address]            |
|                       | [place]  [address] |
+-----------------------+---------------------------+
| Home Phone            | [phone]           |
+-----------------------+---------------------------+
| Preferred Language    | Unknown                   |
+-----------------------+---------------------------+
| Marital Status        |                    |
+-----------------------+---------------------------+
| Mormon Affiliation | 1073                      |
+-----------------------+---------------------------+
| Race                  | White                     |
+-----------------------+---------------------------+
| Ethnic Group          | Not  or     |
+-----------------------+---------------------------+
 
 
 Author
 
 
+--------------+--------------------------------------------+
| Author       |  [facility] [place]  |
|              | and [place]                                |
+--------------+--------------------------------------------+
| Organization |  [facility] [place]  |
|              | and [place]                                |
+--------------+--------------------------------------------+
| Address      | Unknown                                    |
+--------------+--------------------------------------------+
| Phone        | Unavailable                                |
+--------------+--------------------------------------------+
 
 
 
 Support
 
 
+-------------+--------------+-------------------+-----------------+
| Name        | Relationship | Address           | Phone           |
+-------------+--------------+-------------------+-----------------+
| Eduar Colbert | ECON         | [address]       | [phone] |
|             |              | [place]   |                 |
|             |              | [address]             |                 |
+-------------+--------------+-------------------+-----------------+
 
 
 
 
 Care Team Providers
 
 
+-------------------------+------+-----------------+
| Care Team Member Name   | Role | Phone           |
+-------------------------+------+-----------------+
| Tobin Alex MD | PCP  | [phone] |
+-------------------------+------+-----------------+
 
 
 
 Reason for Visit
 
 
+---------+--------+----------------------+
| Reason  | Onset  | Comments             |
|         | Date   |                      |
+---------+--------+----------------------+
| Results | / | NM Bone Scan Results |
|         |    |                      |
+---------+--------+----------------------+
 
 
 
 Encounter Details
 
 
+--------+-----------+----------------------+----------------------+-------------------+
| Date   | Type      | Department           | Care Team            | Description       |
+--------+-----------+----------------------+----------------------+-------------------+
| / | Telephone |   [facility]          |   Juan Carlos Howard,   | Results (NM Bone  |
| 2018   |           | ORTHOPEDIC SURGERY   | MD  [address]     | Scan Results)     |
|        |           | [address]  [place] | [place] [facility]      |                   |
|        |           |  [place]           | [id number] [phone]  |                   |
|        |           | [address]           |  [phone] (Fax)  |                   |
|        |           | [phone]         |                      |                   |
+--------+-----------+----------------------+----------------------+-------------------+
 
 
 
 Social History
 
 
+---------------+------------+-----------+--------+------------------+
| Tobacco Use   | Types      | Packs/Day | Years  | Date             |
|               |            |           | Used   |                  |
+---------------+------------+-----------+--------+------------------+
| Former Smoker | Cigarettes | 1.5       | 5      | Quit: 1979 |
+---------------+------------+-----------+--------+------------------+
 
 
 
+---------------------+---+---+---+
| Smokeless Tobacco:  |   |   |   |
| Never Used          |   |   |   |
+---------------------+---+---+---+
 
 
 
 
+-------------+----------------------+---------+------------------+
| Alcohol Use | Drinks/Week          | oz/Week | Comments         |
+-------------+----------------------+---------+------------------+
| Yes         |   1 Shots of liquor  | 1.0     | "social drinker" |
|             |  0 Standard drinks   |         |                  |
|             | or equivalent        |         |                  |
+-------------+----------------------+---------+------------------+
 
 
 
+------------------+---------------+
| Sex Assigned at  | Date Recorded |
| Birth            |               |
+------------------+---------------+
| Not on file      |               |
+------------------+---------------+
 documented as of this encounter
 
 Miscellaneous Notes
 Telephone Encounter - Evie Boswell Cert MA - 2018  4:06 PM PDTPer Dr.Will sanders I called and notified Susana that the only area that lights up in the bone scan is seen i
n the knee- so they can be reassured that it is very unlikely that the lesion we see in the 
tibia is anything else but a simple bone cyst from the arthritis. Susana was very appreciativ
e of the call and will let us know if she has any other questions or concerns. We cancelled 
her follow up appointment per her request. Electronically signed by Dre Nichole MA at 2018  4:27 PM PDTdocumented in this encounter
 
 Plan of Treatment
 
 
+--------+---------+-----------+----------------------+-------------+
| Date   | Type    | Specialty | Care Team            | Description |
+--------+---------+-----------+----------------------+-------------+
| 10/12/ | Office  | Neurology |   Jamin Brooks MD  [address] |             |
| 2020   | Visit   |           |  [facility]      |             |
|        |         |           | [place],  |             |
|        |         |           | [place] [address]             |             |
|        |         |           | [phone]         |             |
|        |         |           | [phone] (Fax)   |             |
+--------+---------+-----------+----------------------+-------------+
 documented as of this encounter
 
 Visit Diagnoses
 Not on filedocumented in this encounter

## 2020-09-21 NOTE — XMS
Encounter Summary
  Created on: 2020
 
 SayraSusana
 External Reference #: [id number]
 : 48
 Sex: Female
 
 Demographics
 
 
+-----------------------+---------------------------+
| Address               | [address]            |
|                       | [place]  [address] |
+-----------------------+---------------------------+
| Home Phone            | [phone]           |
+-----------------------+---------------------------+
| Preferred Language    | Unknown                   |
+-----------------------+---------------------------+
| Marital Status        |                    |
+-----------------------+---------------------------+
| Faith Affiliation | 1073                      |
+-----------------------+---------------------------+
| Race                  | White                     |
+-----------------------+---------------------------+
| Ethnic Group          | Not  or     |
+-----------------------+---------------------------+
 
 
 Author
 
 
+--------------+--------------------------------------------+
| Author       | [facility] [place]  |
|              | and [place]                                |
+--------------+--------------------------------------------+
| Organization | [facility] [place]  |
|              | and [place]                                |
+--------------+--------------------------------------------+
| Address      | Unknown                                    |
+--------------+--------------------------------------------+
| Phone        | Unavailable                                |
+--------------+--------------------------------------------+
 
 
 
 Support
 
 
+-------------+--------------+-------------------+-----------------+
| Name        | Relationship | Address           | Phone           |
+-------------+--------------+-------------------+-----------------+
| Eduar Colbert | ECON         | [address]       | [phone] |
|             |              | [place]   |                 |
|             |              | [address]             |                 |
+-------------+--------------+-------------------+-----------------+
 
 
 
 
 Care Team Providers
 
 
+------------------------+------+-----------------+
| Care Team Member Name  | Role | Phone           |
+------------------------+------+-----------------+
| Álvaro Cisse MD | PCP  | [phone] |
+------------------------+------+-----------------+
 
 
 
 Reason for Visit
 
 
+---------------------+--------+-----------------------------------------------+
| Reason              | Onset  | Comments                                      |
|                     | Date   |                                               |
+---------------------+--------+-----------------------------------------------+
| Medication Question | / | forgot to ask question at virtual appointment |
|                     | 2020   |                                               |
+---------------------+--------+-----------------------------------------------+
 
 
 
 Encounter Details
 
 
+--------+-----------+---------------------+----------------------+----------------------+
| Date   | Type      | Department          | Care Team            | Description          |
+--------+-----------+---------------------+----------------------+----------------------+
| / | Telephone |   [facility]     |   Jamin Brooks MD  [address] | Medication Question  |
|    |           | NEUROLOGY  [address]     |  [address]      | (forgot to ask       |
|        |           | [address]   | [place]  | question at virtual  |
|        |           | [place]        | [place] [address]             | appointment)         |
|        |           | [address]          | [phone]         |                      |
|        |           | [phone]        | [phone] (Fax)   |                      |
+--------+-----------+---------------------+----------------------+----------------------+
 
 
 
 Social History
 
 
+---------------+------------+-----------+--------+------------------+
| Tobacco Use   | Types      | Packs/Day | Years  | Date             |
|               |            |           | Used   |                  |
+---------------+------------+-----------+--------+------------------+
| Former Smoker | Cigarettes | 1.5       | 5      | Quit: 1979 |
+---------------+------------+-----------+--------+------------------+
 
 
 
+---------------------+---+---+---+
| Smokeless Tobacco:  |   |   |   |
| Never Used          |   |   |   |
+---------------------+---+---+---+
 
 
 
 
+-------------+----------------------+---------+------------------+
| Alcohol Use | Drinks/Week          | oz/Week | Comments         |
+-------------+----------------------+---------+------------------+
| Yes         |   1 Shots of liquor  | 1.0     | "social drinker" |
|             |  0 Standard drinks   |         |                  |
|             | or equivalent        |         |                  |
+-------------+----------------------+---------+------------------+
 
 
 
+------------------+---------------+
| Sex Assigned at  | Date Recorded |
| Birth            |               |
+------------------+---------------+
| Not on file      |               |
+------------------+---------------+
 documented as of this encounter
 
 Miscellaneous Notes
 Telephone Encounter - Wil Hughes Medical Assistant - 2020  1:41 PM PDTPatient
 informed Electronically signed by Wil Hughes Medical Assistant at 2020  1:42 P
M PDTTelephone Encounter - Jamin Brooks MD - 2020 11:07 AM PDTWe did not discuss this in 
the clinic. Can discuss more at her next visit after the [facility].Electronically signed by Jamin vaughn MD at 2020 11:08 AM PDTTelephone Encounter - Wil Hughes Medical Assistant 
- 2020 12:23 PM PDTpatient forgot to ask she would like to start Donepezil if you give
 the okay Electronically signed by Wil Hughes Medical Assistant at 2020 12:24 P
M PDTTelephone Encounter - Luzma Bennett - 2020 12:00 PM PDTNancy, is calling regard
ing Medication Question (forgot to ask question at virtual appointment)
  and would like a call back.  
 
 Additional Call Details:  Requesting call back, [facility]
 [facility] number
 
 ________________________________________________________________
 If this is a symptom based call, was patient offered triage?  Not Applicable
 
 If this is a symptom based call and you were unable to immediately transfer the call to a radha mix or RN was caller made aware that if at any time she feels it is an emergency they sh
ould call 911 or go to the nearest emergency room? not applicable Electronically signed by NICOLA Bennett at 2020 12:02 PM PDTdocumented in this encounter
 
 Plan of Treatment
 
 
+--------+---------+-----------+----------------------+-------------+
| Date   | Type    | Specialty | Care Team            | Description |
+--------+---------+-----------+----------------------+-------------+
| 10/12/ | Office  | Neurology |   Jamin Brooks MD  [address] |             |
|    | Visit   |           [facility]      |             |
|        |         |           | [place]  |             |
|        |         |           | [place] [address]             |             |
|        |         |           | [phone]         |             |
|        |         |           | [phone] (Fax)   |             |
+--------+---------+-----------+----------------------+-------------+
 documented as of this encounter
 
 Visit Diagnoses
 Not on filedocumented in this encounter

## 2020-09-21 NOTE — XMS
Encounter Summary
  Created on: 2020
 
 SayraSusana
 External Reference #: [id number]
 : 48
 Sex: Female
 
 Demographics
 
 
+-----------------------+---------------------------+
| Address               | [address]            |
|                       | [place]  [address] |
+-----------------------+---------------------------+
| Home Phone            | [phone]           |
+-----------------------+---------------------------+
| Preferred Language    | Unknown                   |
+-----------------------+---------------------------+
| Marital Status        |                    |
+-----------------------+---------------------------+
| Sikhism Affiliation | 1073                      |
+-----------------------+---------------------------+
| Race                  | White                     |
+-----------------------+---------------------------+
| Ethnic Group          | Not  or     |
+-----------------------+---------------------------+
 
 
 Author
 
 
+--------------+--------------------------------------------+
| Author       | Located within [facility] [place]  |
|              | and [place]                                |
+--------------+--------------------------------------------+
| Organization | Located within [facility] [place]  |
|              | and [place]                                |
+--------------+--------------------------------------------+
| Address      | Unknown                                    |
+--------------+--------------------------------------------+
| Phone        | Unavailable                                |
+--------------+--------------------------------------------+
 
 
 
 Support
 
 
+-------------+--------------+-------------------+-----------------+
| Name        | Relationship | Address           | Phone           |
+-------------+--------------+-------------------+-----------------+
| Eduar Colbert | ECON         | [address]       | [phone] |
|             |              | [place]   |                 |
|             |              | [address]             |                 |
+-------------+--------------+-------------------+-----------------+
 
 
 
 
 Care Team Providers
 
 
+-------------------------+------+-----------------+
| Care Team Member Name   | Role | Phone           |
+-------------------------+------+-----------------+
| Tobin Alex MD | PCP  | [phone] |
+-------------------------+------+-----------------+
 
 
 
 Encounter Details
 
 
+--------+----------+----------------------+----------------------+-------------+
| Date   | Type     | Department           | Care Team            | Description |
+--------+----------+----------------------+----------------------+-------------+
| / | Abstract |   [facility]          |   Mahin Leiva MD |             |
|    |          | GASTROENTEROLOGY     |   [address]  |             |
|        |          | [address]  | [address]  [place], [place] |             |
|        |          | [address]  [place] |  [address]  [phone] |             |
|        |          |  [address]          |   [phone] (Fax) |             |
|        |          | [phone]         |                      |             |
+--------+----------+----------------------+----------------------+-------------+
 
 
 
 Social History
 
 
+---------------+------------+-----------+--------+------------------+
| Tobacco Use   | Types      | Packs/Day | Years  | Date             |
|               |            |           | Used   |                  |
+---------------+------------+-----------+--------+------------------+
| Former Smoker | Cigarettes | 1.5       | 5      | Quit: 1979 |
+---------------+------------+-----------+--------+------------------+
 
 
 
+---------------------+---+---+---+
| Smokeless Tobacco:  |   |   |   |
| Never Used          |   |   |   |
+---------------------+---+---+---+
 
 
 
+-------------+----------------------+---------+------------------+
| Alcohol Use | Drinks/Week          | oz/Week | Comments         |
+-------------+----------------------+---------+------------------+
| Yes         |   1 Shots of liquor  | 1.0     | "social drinker" |
|             |  0 Standard drinks   |         |                  |
|             | or equivalent        |         |                  |
+-------------+----------------------+---------+------------------+
 
 
 
+------------------+---------------+
| Sex Assigned at  | Date Recorded |
| Birth            |               |
 
+------------------+---------------+
| Not on file      |               |
+------------------+---------------+
 documented as of this encounter
 
 Plan of Treatment
 
 
+--------+---------+-----------+----------------------+-------------+
| Date   | Type    | Specialty | Care Team            | Description |
+--------+---------+-----------+----------------------+-------------+
| 10/12/ | Office  | Neurology |   Jamin Brooks MD  [address] |             |
| 2020   | Visit   |           |  [address]      |             |
|        |         |           | [place]  |             |
|        |         |           | [place] [address]             |             |
|        |         |           | [phone]         |             |
|        |         |           | [phone] (Fax)   |             |
+--------+---------+-----------+----------------------+-------------+
 documented as of this encounter
 
 Visit Diagnoses
 Not on filedocumented in this encounter

## 2020-09-21 NOTE — XMS
Encounter Summary
  Created on: 2020
 
 SayraSusana
 External Reference #: [id number]
 : 48
 Sex: Female
 
 Demographics
 
 
+-----------------------+---------------------------+
| Address               | [address]            |
|                       | [place]  [address] |
+-----------------------+---------------------------+
| Home Phone            | [phone]           |
+-----------------------+---------------------------+
| Preferred Language    | Unknown                   |
+-----------------------+---------------------------+
| Marital Status        |                    |
+-----------------------+---------------------------+
| [employer]an Affiliation | 1073                      |
+-----------------------+---------------------------+
| Race                  | White                     |
+-----------------------+---------------------------+
| Ethnic Group          | Not  or     |
+-----------------------+---------------------------+
 
 
 Author
 
 
+--------------+--------------------------------------------+
| Author       | [facility]  |
|              | and [place]                                |
+--------------+--------------------------------------------+
| Organization | [facility]  |
|              | and [place]                                |
+--------------+--------------------------------------------+
| Address      | Unknown                                    |
+--------------+--------------------------------------------+
| Phone        | Unavailable                                |
+--------------+--------------------------------------------+
 
 
 
 Support
 
 
+-------------+--------------+-------------------+-----------------+
| Name        | Relationship | Address           | Phone           |
+-------------+--------------+-------------------+-----------------+
| Eduar Colbert | ECON         | [address]       | [phone] |
|             |              | [place]   |                 |
|             |              | [address]             |                 |
+-------------+--------------+-------------------+-----------------+
 
 
 
 
 Care Team Providers
 
 
+-------------------------+------+-----------------+
| Care Team Member Name   | Role | Phone           |
+-------------------------+------+-----------------+
| Tobin Alex MD | PCP  | [phone] |
+-------------------------+------+-----------------+
 
 
 
 Reason for Visit
 
 
+------------------+--------+-----------+
| Reason           | Onset  | Comments  |
|                  | Date   |           |
+------------------+--------+-----------+
| Results, Imaging | / | egd,colon |
|                  |    |           |
+------------------+--------+-----------+
 
 
 
 Encounter Details
 
 
+--------+-----------+----------------------+----------------------+-------------------+
| Date   | Type      | Department           | Care Team            | Description       |
+--------+-----------+----------------------+----------------------+-------------------+
| / | Telephone |   [facility]          |   Mahin Leiva MD | Results, Imaging  |
| 2017   |           | GASTROENTEROLOGY     |   [address] [place]  | (egd,colon)       |
|        |           | [address] [place]  | [address]  [place], [place] |                   |
|        |           | [address]  [place], [place] |  [address]  [phone] |                   |
|        |           |  [address]          |   [phone] (Fax) |                   |
|        |           | [phone]         |                      |                   |
+--------+-----------+----------------------+----------------------+-------------------+
 
 
 
 Social History
 
 
+---------------+------------+-----------+--------+------------------+
| Tobacco Use   | Types      | Packs/Day | Years  | Date             |
|               |            |           | Used   |                  |
+---------------+------------+-----------+--------+------------------+
| Former Smoker | Cigarettes | 1.5       | 5      | Quit: 1979 |
+---------------+------------+-----------+--------+------------------+
 
 
 
+---------------------+---+---+---+
| Smokeless Tobacco:  |   |   |   |
| Never Used          |   |   |   |
+---------------------+---+---+---+
 
 
 
 
+-------------+----------------------+---------+------------------+
| Alcohol Use | Drinks/Week          | oz/Week | Comments         |
+-------------+----------------------+---------+------------------+
| Yes         |   1 Shots of liquor  | 1.0     | "social drinker" |
|             |  0 Standard drinks   |         |                  |
|             | or equivalent        |         |                  |
+-------------+----------------------+---------+------------------+
 
 
 
+------------------+---------------+
| Sex Assigned at  | Date Recorded |
| Birth            |               |
+------------------+---------------+
| Not on file      |               |
+------------------+---------------+
 documented as of this encounter
 
 Miscellaneous Notes
 Telephone Encounter - Nusrat Bustillos RN - 2017 10:42 AM PDTNotified patient th
at [facility] Pylori biopsy from stomach was negative.  Duodenal biopsies normal.  Esophageal biopsie
s show mild reflux.  Biopsies from right and left colon normal.  Stool culture positive for 
Campylobacter.  Patient reports watery stools continue intermittently.  We will treat Campyl
obacter with a azithromycin 500 mg 1 by mouth daily for 3 days.  Patient is in [place] so az
ithromycin ordered at [facility] in [facility].  Discussed Campylobacter briefly and patien
t states she does eat a lot of chicken but she will consider having their well checked as he
r  has intermittent diarrhea as well.  She will call if further diarrhea.
 Electronically signed by Nusrat Bustillos RN at 2017 10:48 AM PDTTelephone Encoun
ter - Nusrat Bustillos RN - 2017  9:34 AM PDTLeft message asking patient to call 
for results.  Biopsies negative but stool culture positive for Campylobacter.Electronically 
signed by Nusrat Bustillos RN at 2017  9:35 AM PDTTelephone Encounter - ROSLYN Bustillos RN - 2017  9:28 AM PDTLeft message asking patient to call for results.  St
ool studies positive for Campylobacter.  If diarrhea continues treat with azithromycin 500 m
g daily for 3 daysElectronically signed by Nursat Bustillos RN at 2017  9:29 AM PD
Tdocumented in this encounter
 
 Plan of Treatment
 
 
+--------+---------+-----------+----------------------+-------------+
| Date   | Type    | Specialty | Care Team            | Description |
+--------+---------+-----------+----------------------+-------------+
| 10/12/ | Office  | Neurology |   Jamin Brooks MD  [address] |             |
| 2020   | Visit   |           |  [address]      |             |
|        |         |           | [place],  |             |
|        |         |           | [place] [address]             |             |
|        |         |           | [phone]         |             |
|        |         |           | [phone] (Fax)   |             |
+--------+---------+-----------+----------------------+-------------+
 documented as of this encounter
 
 Visit Diagnoses
 Not on filedocumented in this encounter

## 2020-09-21 NOTE — XMS
Encounter Summary
  Created on: 2020
 
 SayraSusana
 External Reference #: [id number]
 : 48
 Sex: Female
 
 Demographics
 
 
+-----------------------+---------------------------+
| Address               | [address]            |
|                       | [place]  [address] |
+-----------------------+---------------------------+
| Home Phone            | [phone]           |
+-----------------------+---------------------------+
| Preferred Language    | Unknown                   |
+-----------------------+---------------------------+
| Marital Status        |                    |
+-----------------------+---------------------------+
| [employer] Affiliation | 1073                      |
+-----------------------+---------------------------+
| Race                  | White                     |
+-----------------------+---------------------------+
| Ethnic Group          | Not  or     |
+-----------------------+---------------------------+
 
 
 Author
 
 
+--------------+--------------------------------------------+
| Author       | [facility] [place]  |
|              | and [place]                                |
+--------------+--------------------------------------------+
| Organization | [facility] [place]  |
|              | and [place]                                |
+--------------+--------------------------------------------+
| Address      | Unknown                                    |
+--------------+--------------------------------------------+
| Phone        | Unavailable                                |
+--------------+--------------------------------------------+
 
 
 
 Support
 
 
+-------------+--------------+-------------------+-----------------+
| Name        | Relationship | Address           | Phone           |
+-------------+--------------+-------------------+-----------------+
| Eduar Colbert | ECON         | [address]       | [phone] |
|             |              | [place]   |                 |
|             |              | [address]             |                 |
+-------------+--------------+-------------------+-----------------+
 
 
 
 
 Care Team Providers
 
 
+-----------------------+------+-------------+
| Care Team Member Name | Role | Phone       |
+-----------------------+------+-------------+
 PCP  | Unavailable |
+-----------------------+------+-------------+
 
 
 
 Encounter Details
 
 
+--------+-----------+----------------------+-----------+-------------+
| Date   | Type      | Department           | Care Team | Description |
+--------+-----------+----------------------+-----------+-------------+
| / | Hospital  |   [facility] |           |             |
|    | Encounter |  [facility]  |           |             |
|        |           |  [address] |           |             |
|        |           |  [place]           |           |             |
|        |           | [address]           |           |             |
|        |           | [phone]         |           |             |
+--------+-----------+----------------------+-----------+-------------+
 
 
 
 Social History
 
 
+----------------+-------+-----------+--------+------+
| Tobacco Use    | Types | Packs/Day | Years  | Date |
|                |       |           | Used   |      |
+----------------+-------+-----------+--------+------+
| Never Assessed |       |           |        |      |
+----------------+-------+-----------+--------+------+
 
 
 
+------------------+---------------+
| Sex Assigned at  | Date Recorded |
| Birth            |               |
+------------------+---------------+
| Not on file      |               |
+------------------+---------------+
 documented as of this encounter
 
 Plan of Treatment
 
 
+--------+---------+-----------+----------------------+-------------+
| Date   | Type    | Specialty | Care Team            | Description |
+--------+---------+-----------+----------------------+-------------+
| 10/12/ | Office  | Neurology |   Jamin Brooks MD  [address] |             |
| 2020   | Visit   |           |  [address]      |             |
|        |         |           | [place]  |             |
|        |         |           | [place] [address]             |             |
|        |         |           | [phone]         |             |
|        |         |           | [phone] (Fax)   |             |
 
+--------+---------+-----------+----------------------+-------------+
 documented as of this encounter
 
 Visit Diagnoses
 Not on filedocumented in this encounter

## 2020-09-21 NOTE — XMS
Encounter Summary
  Created on: 2020
 
 SayraSusana
 External Reference #: [id number]
 : 48
 Sex: Female
 
 Demographics
 
 
+-----------------------+---------------------------+
| Address               | [address]            |
|                       | [place]  [address] |
+-----------------------+---------------------------+
| Home Phone            | [phone]           |
+-----------------------+---------------------------+
| Preferred Language    | Unknown                   |
+-----------------------+---------------------------+
| Marital Status        |                    |
+-----------------------+---------------------------+
| [employer]orationism Affiliation | 1073                      |
+-----------------------+---------------------------+
| Race                  | White                     |
+-----------------------+---------------------------+
| Ethnic Group          | Not  or     |
+-----------------------+---------------------------+
 
 
 Author
 
 
+--------------+--------------------------------------------+
| Author       | [facility] [place]  |
|              | and [place]                                |
+--------------+--------------------------------------------+
| Organization | [facility]  |
|              | and [place]                                |
+--------------+--------------------------------------------+
| Address      | Unknown                                    |
+--------------+--------------------------------------------+
| Phone        | Unavailable                                |
+--------------+--------------------------------------------+
 
 
 
 Support
 
 
+-------------+--------------+-------------------+-----------------+
| Name        | Relationship | Address           | Phone           |
+-------------+--------------+-------------------+-----------------+
| Eduar Colbert | ECON         | [address]       | [phone] |
|             |              | [place]   |                 |
|             |              | [address]             |                 |
+-------------+--------------+-------------------+-----------------+
 
 
 
 
 Care Team Providers
 
 
+-------------------------+------+-----------------+
| Care Team Member Name   | Role | Phone           |
+-------------------------+------+-----------------+
| Tobin Alex MD | PCP  | [phone] |
+-------------------------+------+-----------------+
 
 
 
 Encounter Details
 
 
+--------+----------+----------------------+----------------------+-------------+
| Date   | Type     | Department           | Care Team            | Description |
+--------+----------+----------------------+----------------------+-------------+
| / | Imaging  |   [facility] |   Provider,          |             |
| 2018   | Exam     |  [facility]    | [address] |             |
|        |          | IMAGING  [address]       |  [address]        |             |
|        |          | [address]  [place]     | [place] [address]     |             |
|        |          | [place] [address] |                      |             |
|        |          |   [phone]       |                      |             |
+--------+----------+----------------------+----------------------+-------------+
 
 
 
 Social History
 
 
+---------------+-------+-----------+--------+------+
| Tobacco Use   | Types | Packs/Day | Years  | Date |
|               |       |           | Used   |      |
+---------------+-------+-----------+--------+------+
| Former Smoker |       |           |        |      |
+---------------+-------+-----------+--------+------+
 
 
 
+---------------------+---+---+---+
| Smokeless Tobacco:  |   |   |   |
| Never Used          |   |   |   |
+---------------------+---+---+---+
 
 
 
+------------------------+
| Comments: 10 years ago |
+------------------------+
 
 
 
+-------------+----------------------+---------+------------------+
| Alcohol Use | Drinks/Week          | oz/Week | Comments         |
+-------------+----------------------+---------+------------------+
| Yes         |   0 Standard drinks  | 0.0     | "social drinker" |
|             | or equivalent        |         |                  |
+-------------+----------------------+---------+------------------+
 
 
 
 
+------------------+---------------+
| Sex Assigned at  | Date Recorded |
| Birth            |               |
+------------------+---------------+
| Not on file      |               |
+------------------+---------------+
 documented as of this encounter
 
 Plan of Treatment
 
 
+--------+---------+-----------+----------------------+-------------+
| Date   | Type    | Specialty | Care Team            | Description |
+--------+---------+-----------+----------------------+-------------+
| 10/12/  Office  | Neurology |   Jamin Brooks MD  [address] |             |
| 2020   | Visit   |           |  [address]      |             |
|        |         |           | [address]  [place],  |             |
|        |         |           | [place] [address]             |             |
|        |         |           | [phone]         |             |
|        |         |           | [phone] (Fax)   |             |
+--------+---------+-----------+----------------------+-------------+
 documented as of this encounter
 
 Procedures
 
 
+-------------------+--------+-------------+----------------------+----------------------+
| Procedure Name    | Priori | Date/Time   | Associated Diagnosis | Comments             |
|                   | ty     |             |                      |                      |
+-------------------+--------+-------------+----------------------+----------------------+
| XR KNEE LEFT 3 VW | Routin | 12/10/2015  |                      |   Results for this   |
|                   | e      | 10:15 AM    |                      | procedure are in the |
|                   |        | PST         |                      |  results section.    |
+-------------------+--------+-------------+----------------------+----------------------+
 documented in this encounter
 
 Results
 XR Knee Left 3 Vw (12/10/2015 10:15 AM PST)
 
+----------+
| Specimen |
+----------+
|          |
+----------+
 
 
 
+--------------------------------------------------------------------+---------------+
| Narrative                                                          | Performed At  |
+--------------------------------------------------------------------+---------------+
|   External films for comparison only - no result from [facility].  |   [facility] |
+--------------------------------------------------------------------+---------------+
 
 
 
+---------------+---------+--------------------+--------------+
| Performing    | Address | City/State/Zipcode | Phone Number |
| Organization  |         |                    |              |
 
+---------------+---------+--------------------+--------------+
|   [facility] |         |                    |              |
+---------------+---------+--------------------+--------------+
 documented in this encounter
 
 Visit Diagnoses
 Not on filedocumented in this encounter

## 2020-09-21 NOTE — XMS
Encounter Summary
  Created on: 2020
 
 SayraSusana
 External Reference #: [id number]
 : 48
 Sex: Female
 
 Demographics
 
 
+-----------------------+---------------------------+
| Address               | [address]            |
|                       | [place]  [address] |
+-----------------------+---------------------------+
| Home Phone            | [phone]           |
+-----------------------+---------------------------+
| Preferred Language    | Unknown                   |
+-----------------------+---------------------------+
| Marital Status        |                    |
+-----------------------+---------------------------+
| Quaker Affiliation | 1073                      |
+-----------------------+---------------------------+
| Race                  | White                     |
+-----------------------+---------------------------+
| Ethnic Group          | Not  or     |
+-----------------------+---------------------------+
 
 
 Author
 
 
+--------------+--------------------------------------------+
| Author       | [facility]  |
|              | and [place]                                |
+--------------+--------------------------------------------+
| Organization | [facility]  |
|              | and [place]                                |
+--------------+--------------------------------------------+
| Address      | Unknown                                    |
+--------------+--------------------------------------------+
| Phone        | Unavailable                                |
+--------------+--------------------------------------------+
 
 
 
 Support
 
 
+-------------+--------------+-------------------+-----------------+
| Name        | Relationship | Address           | Phone           |
+-------------+--------------+-------------------+-----------------+
| Eduar Colbert | ECON         | [address]       | [phone] |
|             |              | [place]   |                 |
|             |              | [address]             |                 |
+-------------+--------------+-------------------+-----------------+
 
 
 
 
 Care Team Providers
 
 
+-------------------------+------+-----------------+
| Care Team Member Name   | Role | Phone           |
+-------------------------+------+-----------------+
| Tobin Alex MD | PCP  | [phone] |
+-------------------------+------+-----------------+
 
 
 
 Reason for Visit
 
 
+-------------+--------+-------------------------------------------+
| Reason      | Onset  | Comments                                  |
|             | Date   |                                           |
+-------------+--------+-------------------------------------------+
| Appointment | / | Rescheduled due to Dr. called to hospital |
|             | 2017   |                                           |
+-------------+--------+-------------------------------------------+
 
 
 
 Encounter Details
 
 
+--------+-----------+----------------------+----------------------+----------------------+
| Date   | Type      | Department           | Care Team            | Description          |
+--------+-----------+----------------------+----------------------+----------------------+
| / | Telephone |   [facility]          |   Mahin Leiva MD | Appointment          |
| 2017   |           | GASTROENTEROLOGY     |   [address] [place]  | (Rescheduled due to  |
|        |           | [address] [place]  | [address]  [place] |  called to        |
|        |           | [address]  [place], [place] |  [address] [phone] | \[facility]\"")            |
|        |           |  [address]          |   [phone] (Fax) |                      |
|        |           | [phone]         |                      |                      |
+--------+-----------+----------------------+----------------------+----------------------+
 
 
 
 Social History
 
 
+---------------+-------+-----------+--------+------+
| Tobacco Use   | Types | Packs/Day | Years  | Date |
|               |       |           | Used   |      |
+---------------+-------+-----------+--------+------+
| Former Smoker |       |           |        |      |
+---------------+-------+-----------+--------+------+
 
 
 
+---------------------+---+---+---+
| Smokeless Tobacco:  |   |   |   |
| Never Used          |   |   |   |
+---------------------+---+---+---+
 
 
 
 
+------------------------+
| Comments: 10 years ago |
+------------------------+
 
 
 
+-------------+----------------------+---------+------------------+
| Alcohol Use | Drinks/Week          | oz/Week | Comments         |
+-------------+----------------------+---------+------------------+
| Yes         |   0 Standard drinks  | 0.0     | "social drinker" |
|             | or equivalent        |         |                  |
+-------------+----------------------+---------+------------------+
 
 
 
+------------------+---------------+
| Sex Assigned at  | Date Recorded |
| Birth            |               |
+------------------+---------------+
| Not on file      |               |
+------------------+---------------+
 documented as of this encounter
 
 Miscellaneous Notes
 Telephone Encounter - Nusrat Bustillos RN - 2017  1:41 PM PSTSpoke with patient 
and she will cancel her appt for today due to Dr Leiva being called to the hospital for an e
mergent procedure.  She mainly wanted to talk to Dr Leiva about diarrhea and she is due for 
colonoscopy.  She will call and talk to Rodriguez next week and we will fit her in when it is co
nvenient.Electronically signed by Nusrat Bustillos RN at 2017  1:44 PM PSTdocument
ed in this encounter
 
 Plan of Treatment
 
 
+--------+---------+-----------+----------------------+-------------+
| Date   | Type    | Specialty | Care Team            | Description |
+--------+---------+-----------+----------------------+-------------+
| 10/12/ | Office  | Neurology |   Jamin Brooks MD  [address] |             |
| 2020   | Visit   |           |  [address]      |             |
|        |         |           | [place],  |             |
|        |         |           | [place] [address]             |             |
|        |         |           | [phone]         |             |
|        |         |           | [phone] (Fax)   |             |
+--------+---------+-----------+----------------------+-------------+
 documented as of this encounter
 
 Visit Diagnoses
 Not on filedocumented in this encounter

## 2020-09-21 NOTE — XMS
Encounter Summary
  Created on: 2020
 
 SayraSusana
 External Reference #: [id number]
 : 48
 Sex: Female
 
 Demographics
 
 
+-----------------------+---------------------------+
| Address               | [address]            |
|                       | [place]  [address] |
+-----------------------+---------------------------+
| Home Phone            | [phone]           |
+-----------------------+---------------------------+
| Preferred Language    | Unknown                   |
+-----------------------+---------------------------+
| Marital Status        |                    |
+-----------------------+---------------------------+
| Cheondo[employer] Affiliation | 1073                      |
+-----------------------+---------------------------+
| Race                  | White                     |
+-----------------------+---------------------------+
| Ethnic Group          | Not  or     |
+-----------------------+---------------------------+
 
 
 Author
 
 
+--------------+--------------------------------------------+
| Author       | [facility]  |
|              | and [place]                                |
+--------------+--------------------------------------------+
| Organization | [facility]  |
|              | and [place]                                |
+--------------+--------------------------------------------+
| Address      | Unknown                                    |
+--------------+--------------------------------------------+
| Phone        | Unavailable                                |
+--------------+--------------------------------------------+
 
 
 
 Support
 
 
+-------------+--------------+-------------------+-----------------+
| Name        | Relationship | Address           | Phone           |
+-------------+--------------+-------------------+-----------------+
| Eduar Colbert | ECON         | [address]       | [phone] |
|             |              | [place]   |                 |
|             |              | [address]             |                 |
+-------------+--------------+-------------------+-----------------+
 
 
 
 
 Care Team Providers
 
 
+-------------------------+------+-----------------+
| Care Team Member Name   | Role | Phone           |
+-------------------------+------+-----------------+
| Tobin Alex MD | PCP  | [phone] |
+-------------------------+------+-----------------+
 
 
 
 Encounter Details
 
 
+--------+----------+----------------------+----------------------+-------------+
| Date   | Type     | Department           | Care Team            | Description |
+--------+----------+----------------------+----------------------+-------------+
| / | Abstract |   [facility]          |   Mahin Leiva MD |             |
|    |          | GASTROENTEROLOGY     |   [address], [place]  |             |
|        |          | [address] [place]  | [address]  [place], [place] |             |
|        |          | [address]  [place] |  [address]  [phone] |             |
|        |          |  [address]          |   [phone] (Fax) |             |
|        |          | [phone]         |                      |             |
+--------+----------+----------------------+----------------------+-------------+
 
 
 
 Social History
 
 
+---------------+-------+-----------+--------+------+
| Tobacco Use   | Types | Packs/Day | Years  | Date |
|               |       |           | Used   |      |
+---------------+-------+-----------+--------+------+
| Former Smoker |       |           |        |      |
+---------------+-------+-----------+--------+------+
 
 
 
+---------------------+---+---+---+
| Smokeless Tobacco:  |   |   |   |
| Never Used          |   |   |   |
+---------------------+---+---+---+
 
 
 
+------------------------+
| Comments: 10 years ago |
+------------------------+
 
 
 
+-------------+----------------------+---------+------------------+
| Alcohol Use | Drinks/Week          | oz/Week | Comments         |
+-------------+----------------------+---------+------------------+
| Yes         |   0 Standard drinks  | 0.0     | "social drinker" |
|             | or equivalent        |         |                  |
+-------------+----------------------+---------+------------------+
 
 
 
 
+------------------+---------------+
| Sex Assigned at  | Date Recorded |
| Birth            |               |
+------------------+---------------+
| Not on file      |               |
+------------------+---------------+
 documented as of this encounter
 
 Plan of Treatment
 
 
+--------+---------+-----------+----------------------+-------------+
| Date   | Type    | Specialty | Care Team            | Description |
+--------+---------+-----------+----------------------+-------------+
| 10/12/ | Office  | Neurology |   Jamin Brooks MD  [address] |             |
| 2020   | Visit   |           |  [address]      |             |
|        |         |           | [place]  |             |
|        |         |           | [place] [address]             |             |
|        |         |           | [phone]         |             |
|        |         |           | [phone] (Fax)   |             |
+--------+---------+-----------+----------------------+-------------+
 documented as of this encounter
 
 Visit Diagnoses
 Not on filedocumented in this encounter

## 2020-09-21 NOTE — XMS
Encounter Summary
  Created on: 2020
 
 SayraSusana
 External Reference #: [id number]
 : 48
 Sex: Female
 
 Demographics
 
 
+-----------------------+---------------------------+
| Address               | [address]            |
|                       | [place]  [address] |
+-----------------------+---------------------------+
| Home Phone            | [phone]           |
+-----------------------+---------------------------+
| Preferred Language    | Unknown                   |
+-----------------------+---------------------------+
| Marital Status        |                    |
+-----------------------+---------------------------+
| Samar[employer] Affiliation | 1073                      |
+-----------------------+---------------------------+
| Race                  | White                     |
+-----------------------+---------------------------+
| Ethnic Group          | Not  or     |
+-----------------------+---------------------------+
 
 
 Author
 
 
+--------------+--------------------------------------------+
| Author       | [facility] [place]  |
|              | and [place]                                |
+--------------+--------------------------------------------+
| Organization | [facility] [place]  |
|              | and [place]                                |
+--------------+--------------------------------------------+
| Address      | Unknown                                    |
+--------------+--------------------------------------------+
| Phone        | Unavailable                                |
+--------------+--------------------------------------------+
 
 
 
 Support
 
 
+-------------+--------------+-------------------+-----------------+
| Name        | Relationship | Address           | Phone           |
+-------------+--------------+-------------------+-----------------+
| Eduar Colbert | ECON         | [address]       | [phone] |
|             |              | [place]   |                 |
|             |              | [address]             |                 |
+-------------+--------------+-------------------+-----------------+
 
 
 
 
 Care Team Providers
 
 
+-------------------------+------+-----------------+
| Care Team Member Name   | Role | Phone           |
+-------------------------+------+-----------------+
| Tobin Alex MD | PCP  | [phone] |
+-------------------------+------+-----------------+
 
 
 
 Reason for Referral
 Evaluate & Treat (Emergency)
 
+--------+--------------+---------------+--------------+--------------+---------------+
| Status | Reason       | Specialty     | Diagnoses /  | Referred By  | Referred To   |
|        |              |               | Procedures   | Contact      | Contact       |
+--------+--------------+---------------+--------------+--------------+---------------+
| Closed |   Specialty  | Otolaryngolog |   Diagnoses  |   Nava,     |   Pabl[facility]ava |
|        | Services     | y             |  Neoplasm of | Pablito E, MD   |  E, MD  [address]  |
|        | Required     |               |  uncertain   | [address]   | [address]     |
|        |              |               | behavior of  | [address]   | [address]  [place]  |
|        |              |               | lip, oral    | [place], | [place]     |
|        |              |               | cavity, and  |  [place] [address]    | [address]  Phone: |
|        |              |               | pharynx      | Phone:       |  [phone] |
|        |              |               | Procedures   | [phone] |   Fax:        |
|        |              |               | [facility]     |   Fax:       | [phone]  |
|        |              |               | MOUTH        | [phone] |               |
|        |              |               | MUCOSA/SUB,S |              |               |
|        |              |               | IMPL REPAIR  |              |               |
+--------+--------------+---------------+--------------+--------------+---------------+
 
 
 
 
 Encounter Details
 
 
+--------+-------------+----------------------+----------------------+----------------------
+
| Date   | Type        | Department           | Care Team            | Description          
|
+--------+-------------+----------------------+----------------------+----------------------
+
| / | Orders Only |   [facility] [place]          |   Pablito Nava MD  | Neoplasm of          
|
| 2015   |             | OTOLARYNGOLOGY  [address]  |  [address]  [address] | uncertain behavior   
|
|        |             | [address]  |  [address]  [place]  | of lip, oral cavity, 
|
|        |             |  [place]     | [address]  [phone]  |  and pharynx         
|
|        |             | [address]           |  [phone] (Fax)  | (Primary Dx)         
|
|        |             | [phone]         |                      |                      
|
+--------+-------------+----------------------+----------------------+----------------------
+
 
 
 
 
 Social History
 
 
+---------------+-------+-----------+--------+------+
| Tobacco Use   | Types | Packs/Day | Years  | Date |
|               |       |           | Used   |      |
+---------------+-------+-----------+--------+------+
| Former Smoker |       |           |        |      |
+---------------+-------+-----------+--------+------+
 
 
 
+------------------------+
| Comments: 10 years ago |
+------------------------+
 
 
 
+-------------+-------------+---------+----------+
| Alcohol Use | Drinks/Week | oz/Week | Comments |
+-------------+-------------+---------+----------+
| Not Asked   |             |         |          |
+-------------+-------------+---------+----------+
 
 
 
+------------------+---------------+
| Sex Assigned at  | Date Recorded |
| Birth            |               |
+------------------+---------------+
| Not on file      |               |
+------------------+---------------+
 documented as of this encounter
 
 Plan of Treatment
 
 
+--------+---------+-----------+----------------------+-------------+
| Date   | Type    | Specialty | Care Team            | Description |
+--------+---------+-----------+----------------------+-------------+
| 10/12/ | Office  | Neurology |   Jamin Brooks MD  [address] |             |
| 2020   | Visit   |           |  [address]      |             |
|        |         |           | [address]  [place],  |             |
|        |         |           | [place] [address]             |             |
|        |         |           | [phone]         |             |
|        |         |           | [phone] (Fax)   |             |
+--------+---------+-----------+----------------------+-------------+
 
 
 
+----------------------+-------------+--------+----------------------+----------------------
+
| Name                 | Type        | Priori | Associated Diagnoses | Order Schedule       
|
|                      |             | ty     |                      |                      
|
+----------------------+-------------+--------+----------------------+----------------------
+
 
| Ambulatory referral  | Outpatient  | STAT   |   Neoplasm of        | Expected:            
|
| to ENT               | Referral    |        | uncertain behavior   | 2015, Expires: 
|
|                      |             |        | of lip, oral cavity, |  2016          
|
|                      |             |        |  and pharynx         |                      
|
+----------------------+-------------+--------+----------------------+----------------------
+
 documented as of this encounter
 
 Visit Diagnoses
 
 
+-----------------------------------------------------------------------------+
| Diagnosis                                                                   |
+-----------------------------------------------------------------------------+
|   Neoplasm of uncertain behavior of lip, oral cavity, and pharynx - Primary |
+-----------------------------------------------------------------------------+
 documented in this encounter

## 2020-09-21 NOTE — XMS
Clinical Summary
  Created on: 2020
 
 Susana Colbert
 External Reference #: [id number]
 : 48
 Sex: Female
 
 Demographics
 
 
+-----------------------+------------------------+
| Address               | [address]         |
|                       | [place]  [address]   |
+-----------------------+------------------------+
| Home Phone            | [phone]        |
+-----------------------+------------------------+
| Preferred Language    | Unknown                |
+-----------------------+------------------------+
| Marital Status        |                 |
+-----------------------+------------------------+
| [employer] Affiliation | Unknown                |
+-----------------------+------------------------+
| Race                  | White                  |
+-----------------------+------------------------+
| Ethnic Group          | Not  or  |
+-----------------------+------------------------+
 
 
 Author
 
 
+--------------+--------------+
| Author       | [facility] |
+--------------+--------------+
| Organization | [facility] |
+--------------+--------------+
| Address      | Unknown      |
+--------------+--------------+
| Phone        | Unavailable  |
+--------------+--------------+
 
 
 
 Support
 
 
+--------------+--------------+---------+-----------------+
| Name         | Relationship | Address | Phone           |
+--------------+--------------+---------+-----------------+
| Raj Mckeon | ECON         | Unknown | [phone] |
+--------------+--------------+---------+-----------------+
 
 
 
 Care Team Providers
 
 
 
+-------------------------+------+-----------------+
| Care Team Member Name   | Role | Phone           |
+-------------------------+------+-----------------+
| Tobin Alex MD | PCP  | [phone] |
+-------------------------+------+-----------------+
 
 
 
 Source Comments
 [facility] is fully live on both [facility] and [facility]
 
 Allergies
 
 
+----------------+--------------+----------+----------+----------+
| Active Allergy | Reactions    | Severity | Noted    | Comments |
|                |              |          | Date     |          |
+----------------+--------------+----------+----------+----------+
| Moexipril Hcl  | Bronchospasm |          | 20 |          |
|                |              |          | 10       |          |
+----------------+--------------+----------+----------+----------+
 
 
 
 Medications
 
 
+----------------------+----------------------+-----------+---------+------+------+-------+
| Medication           | Sig                  | Dispensed | Refills | Star | End  | Statu |
|                      |                      |           |         | t    | Date | s     |
|                      |                      |           |         | Date |      |       |
+----------------------+----------------------+-----------+---------+------+------+-------+
|   flecainide 100 mg  | Take 100 mg by mouth |           | 0       |      |      | Activ |
| Oral Tablet          |  two times daily.    |           |         |      |      | e     |
+----------------------+----------------------+-----------+---------+------+------+-------+
|   SERTRALINE HCL     | Take 50 mg by mouth. |           | 0       |      |      | Activ |
| (ZOLOFT OR)          |  125 mg daily        |           |         |      |      | e     |
+----------------------+----------------------+-----------+---------+------+------+-------+
|   gabapentin 300 mg  | Take 300 mg by mouth |           | 0       |      |      | Activ |
| Oral Capsule         |  three times daily.  |           |         |      |      | e     |
+----------------------+----------------------+-----------+---------+------+------+-------+
|   MOMETASONE FUROATE | Instill  in nose as  |           | 0       | 04/2 |      | Activ |
|  (NASONEX NA)        | needed.              |           |         | 7/20 |      | e     |
|                      |                      |           |         | 10   |      |       |
+----------------------+----------------------+-----------+---------+------+------+-------+
|   TETRAHYDROZOLINE   | Instill  in eye.     |           | 0       |      |      | Activ |
| HCL (VISINE OP)      |                      |           |         |      |      | e     |
+----------------------+----------------------+-----------+---------+------+------+-------+
|   CALCIUM CITRATE    | Take  by mouth three |           | 0       | 04/2 |      | Activ |
| (ARUN-CITRATE OR)     |  times daily.        |           |         | 7/20 |      | e     |
|                      | 630/300mg tid        |           |         | 10   |      |       |
+----------------------+----------------------+-----------+---------+------+------+-------+
|   BABY ASPIRIN OR    | Take 81 mg by mouth  |           | 0       | 04/2 |      | Activ |
|                      | once daily.          |           |         | 7/20 |      | e     |
|                      |                      |           |         | 10   |      |       |
+----------------------+----------------------+-----------+---------+------+------+-------+
|   lidocaine 2 %      | by Topical route.    |   30 mL   | 2       | 04/2 |      | Activ |
| Mucous Membrane Gel  | apply to vestibule   |           |         |  |      | e     |
|                      | as needed            |           |         | 10   |      |       |
 
+----------------------+----------------------+-----------+---------+------+------+-------+
|   ERGOCALCIFEROL     | Take 1,000 Int'l     |           | 0       | 04/2 |      | Activ |
| (VITAMIN D OR)       | Units/day by mouth   |           |         |  |      | e     |
|                      | once daily.          |           |         | 10   |      |       |
+----------------------+----------------------+-----------+---------+------+------+-------+
|   fluconazole 150 mg | Take 150 mg by mouth |           | 0       | 04/2 |      | Activ |
|  Oral Tablet         |  every seven days.   |           |         |  |      | e     |
|                      |                      |           |         | 10   |      |       |
+----------------------+----------------------+-----------+---------+------+------+-------+
 
 
 
 Active Problems
 Not on file
 
 Family History
 
 
+-----------------+----------+------+----------+
| Medical History | Relation | Name | Comments |
+-----------------+----------+------+----------+
| Arthritis       | Father   |      |          |
+-----------------+----------+------+----------+
| Heart Disease   | Father   |      |          |
+-----------------+----------+------+----------+
| Cancer          | Mother   |      | breast   |
+-----------------+----------+------+----------+
 
 
 
+----------+------+----------+----------+
| Relation | Name | Status   | Comments |
+----------+------+----------+----------+
| Daughter |      |  | 2009     |
+----------+------+----------+----------+
| Father   |      |          |          |
+----------+------+----------+----------+
| Mother   |      |          |          |
+----------+------+----------+----------+
 
 
 
 Social History
 
 
+---------------+------------+-----------+--------+------+
| Tobacco Use   | Types      | Packs/Day | Years  | Date |
|               |            |           | Used   |      |
+---------------+------------+-----------+--------+------+
| Former Smoker | Cigarettes | 2         | 6      |      |
+---------------+------------+-----------+--------+------+
 
 
 
+-------------+-------------+---------+----------+
| Alcohol Use | Drinks/Week | oz/Week | Comments |
+-------------+-------------+---------+----------+
| Yes         |             |         |          |
+-------------+-------------+---------+----------+
 
 
 
 
+------------------+---------------+
| Sex Assigned at  | Date Recorded |
| Birth            |               |
+------------------+---------------+
| Not on file      |               |
+------------------+---------------+
 
 
 
 Last Filed Vital Signs
 
 
+-------------------+----------------------+----------------------+----------+
| Vital Sign        | Reading              | Time Taken           | Comments |
+-------------------+----------------------+----------------------+----------+
| Blood Pressure    | -                    | -                    |          |
+-------------------+----------------------+----------------------+----------+
| Pulse             | -                    | -                    |          |
+-------------------+----------------------+----------------------+----------+
| Temperature       | -                    | -                    |          |
+-------------------+----------------------+----------------------+----------+
| Respiratory Rate  | -                    | -                    |          |
+-------------------+----------------------+----------------------+----------+
| Oxygen Saturation | -                    | -                    |          |
+-------------------+----------------------+----------------------+----------+
| Inhaled Oxygen    | -                    | -                    |          |
| Concentration     |                      |                      |          |
+-------------------+----------------------+----------------------+----------+
| Weight            | 61.5 kg (135 lb 9.6  | 2010  2:15 PM  |          |
|                   | oz)                  | PDT                  |          |
+-------------------+----------------------+----------------------+----------+
| Height            | 170.2 cm (5' 7")     | 2010  2:15 PM  |          |
|                   |                      | PDT                  |          |
+-------------------+----------------------+----------------------+----------+
| Body Mass Index   | 21.24                | 2010  2:15 PM  |          |
|                   |                      | PDT                  |          |
+-------------------+----------------------+----------------------+----------+
 
 
 
 Plan of Treatment
 
 
+----------------------+-----------+-------+----------+
| Health Maintenance   | Due Date  | Last  | Comments |
|                      |           | Done  |          |
+----------------------+-----------+-------+----------+
| Mammogram            |  |       |          |
|                      | 9         |       |          |
+----------------------+-----------+-------+----------+
| Pneumococcal         |  |       |          |
| vaccination (1 of 1  | 4         |       |          |
| - PPSV23)            |           |       |          |
+----------------------+-----------+-------+----------+
| Influenza (Flu)      | 10/01/202 |       |          |
| vaccination (#1)     | 0         |       |          |
+----------------------+-----------+-------+----------+
 
 
 
 
 Results
 Not on filefrom Last 3 Months
 
 Insurance
 
 
+---------------------+--------+-------------+--------+-------+---------+------+
| Payer               | Benefi | Subscriber  | Effect | Phone | Address | Type |
|                     | t Plan | ID          | t[place]ana    |       |         |      |
|                     |  /     |             | Dates  |       |         |      |
|                     | Group  |             |        |       |         |      |
+---------------------+--------+-------------+--------+-------+---------+------+
| [employer] | FIRST  | [id number]   | Effect |       |         | PPO  |
|                     | CHOICE |             | t[place]ana    |       |         |      |
|                     |        |             | for    |       |         |      |
|                     | HEALTH |             | all    |       |         |      |
|                     |        |             | dates  |       |         |      |
+---------------------+--------+-------------+--------+-------+---------+------+
 
 
 
+----------------+--------+-------------+--------+-------------+---------------------+
| Guarantor Name | Accoun | Relation to | Date   | Phone       | Billing Address     |
|                | t Type |  Patient    | of     |             |                     |
|                |        |             | Birth  |             |                     |
+----------------+--------+-------------+--------+-------------+---------------------+
| Susana Colbert | Person | Self        | / |             |   [address]    |
|                | al/Fam |             | 1950   | [phone] | [place] [id number] |
|                | kasia    |             |        | 5 (Home)    |                     |
+----------------+--------+-------------+--------+-------------+---------------------+

## 2020-09-21 NOTE — EKG
[facility]
                                    [address]
                                  [place], [place]  [address]
_________________________________________________________________________________________
                                                                 Signed   
 
 
Sinus bradycardia
Anteroseptal infarct , age undetermined
Abnormal ECG
No previous ECGs available
Confirmed by JESS BROWNLEE MD ([id number]) on 9/21/2020 11:44:50 PM
 
 
 
 
 
 
 
 
 
 
 
 
 
 
 
 
 
 
 
 
 
 
 
 
 
 
 
 
 
 
 
 
 
 
 
 
 
 
 
 
    Electronically Signed By: JESS BROWNLEE MD  09/21/20 2345
_________________________________________________________________________________________
PATIENT NAME:     NILES QUIJANO                
MEDICAL RECORD #: [id number]                     Electrocardiogram             
          ACCT #: [id number]  
DATE OF BIRTH:   [birth year]                                       
PHYSICIAN:   JESS BROWNLEE MD                   REPORT #: [id number]
REPORT IS CONFIDENTIAL AND NOT TO BE RELEASED WITHOUT AUTHORIZATION

## 2020-09-21 NOTE — XMS
Encounter Summary
  Created on: 2020
 
 SayraSusana
 External Reference #: [id number]
 : 48
 Sex: Female
 
 Demographics
 
 
+-----------------------+---------------------------+
| Address               | [address]            |
|                       | [place]  [address] |
+-----------------------+---------------------------+
| Home Phone            | [phone]           |
+-----------------------+---------------------------+
| Preferred Language    | Unknown                   |
+-----------------------+---------------------------+
| Marital Status        |                    |
+-----------------------+---------------------------+
| [employer] Affiliation | 1073                      |
+-----------------------+---------------------------+
| Race                  | White                     |
+-----------------------+---------------------------+
| Ethnic Group          | Not  or     |
+-----------------------+---------------------------+
 
 
 Author
 
 
+--------------+--------------------------------------------+
| Author       | [facility]  |
|              | [facility] [place]                                |
+--------------+--------------------------------------------+
| Organization | [facility]  |
|              | [facility] [place]                                |
+--------------+--------------------------------------------+
| Address      | Unknown                                    |
+--------------+--------------------------------------------+
| Phone        | Unavailable                                |
+--------------+--------------------------------------------+
 
 
 
 Support
 
 
+-------------+--------------+-------------------+-----------------+
| Name        | Relationship | Address           | Phone           |
+-------------+--------------+-------------------+-----------------+
| Eduar Colbert | ECON         | [address]       | [phone] |
|             |              | [place]   |                 |
|             |              | [address]             |                 |
+-------------+--------------+-------------------+-----------------+
 
 
 
 
 Care Team Providers
 
 
+-------------------------+------+-----------------+
| Care Team Member Name   | Role | Phone           |
+-------------------------+------+-----------------+
| Tobin Alex MD | PCP  | [phone] |
+-------------------------+------+-----------------+
 
 
 
 Reason for Visit
 
 
+-----------------+--------+------------------------+
| Reason          | Onset  | Comments               |
|                 | Date   |                        |
+-----------------+--------+------------------------+
| Post-op Problem | / | stitches bothering her |
|                 |    |                        |
+-----------------+--------+------------------------+
 
 
 
 Encounter Details
 
 
+--------+-----------+----------------------+----------------------+----------------------+
| Date   | Type      | Department           | Care Team            | Description          |
+--------+-----------+----------------------+----------------------+----------------------+
| / | Telephone |   [facility]          |   Pablito Nava MD  | Post-op Problem      |
| 2015   |           | OTOLARYNGOLOGY  [address]  |  [address]  [address] | (rosy [address]  |
|        |           | [address] [facility] [address]  |  [address]  [place], [place]  | her)                 |
|        |           |  [place]     | [address] [phone]  |                      |
|        |           | [address]           |  [phone] (Fax)  |                      |
|        |           | [phone]         |                      |                      |
+--------+-----------+----------------------+----------------------+----------------------+
 
 
 
 Social History
 
 
+---------------+-------+-----------+--------+------+
| Tobacco Use   | Types | Packs/Day | Years  | Date |
|               |       |           | Used   |      |
+---------------+-------+-----------+--------+------+
| Former Smoker |       |           |        |      |
+---------------+-------+-----------+--------+------+
 
 
 
+------------------------+
| Comments: 10 years ago |
+------------------------+
 
 
 
+-------------+-------------+---------+----------+
 
| Alcohol Use | Drinks/Week | oz/Week | Comments |
+-------------+-------------+---------+----------+
| Not Asked   |             |         |          |
+-------------+-------------+---------+----------+
 
 
 
+------------------+---------------+
| Sex Assigned at  | Date Recorded |
| Birth            |               |
+------------------+---------------+
| Not on file      |               |
+------------------+---------------+
 documented as of this encounter
 
 Miscellaneous Notes
 Telephone Encounter - Radelfinger, Danay S, RN - 2015  1:33 PM PSTCalled patient an
d told her Dr Nava could see her today.Electronically signed by MARGARET Zurita
t 2015  1:48 PM PSTTelephone Encounter - Pablito Nava MD - 2015 12:16 PM PSTEa
rly is okElectronically signed by Pablito Nava MD at 2015 12:16 PM PSTTelephone Encou
nter - Danay Pickens RN - 2015 10:57 AM PSTPlease advise. She doesn't have a
 post prodecure appointment scheduled yet.Electronically signed by Danay Pickens RN
 at 2015 10:59 AM PSTTelephone Encounter - Sarah Beth Hernandez - 2015  9:21 AM PSTP
atient called saying she is having some trouble with her stitches. Dr Nava did a minor proc
edure last Wednesday and she said she had to have her  take 2 of the stiches out gianna
use they were bothering her (he's a vet) She said there is still one in there that is bother
ing her and wanted a call back from the nurse to see if she can get the last one out a littl
e early. Please call back on the cell phone listed and advise patient.Electronically signed 
by Sarah Beth Hernandez at 2015  9:23 AM PSTdocumented in this encounter
 
 Plan of Treatment
 
 
+--------+---------+-----------+----------------------+-------------+
| Date   | Type    | Specialty | Care Team            | Description |
+--------+---------+-----------+----------------------+-------------+
| 10/12/ | Office  | Neurology |   Jamin Brooks MD  [address] |             |
| 2020   | Visit   |           |  [address]      |             |
|        |         |           | [place]  |             |
|        |         |           | [place] [address]             |             |
|        |         |           | [phone]         |             |
|        |         |           | [phone] (Fax)   |             |
+--------+---------+-----------+----------------------+-------------+
 documented as of this encounter
 
 Visit Diagnoses
 Not on filedocumented in this encounter

## 2020-09-21 NOTE — XMS
Encounter Summary
  Created on: 2020
 
 SayraSusana
 External Reference #: [id number]
 : 48
 Sex: Female
 
 Demographics
 
 
+-----------------------+---------------------------+
| Address               | [address]            |
|                       | [place]  [address] |
+-----------------------+---------------------------+
| Home Phone            | [phone]           |
+-----------------------+---------------------------+
| Preferred Language    | Unknown                   |
+-----------------------+---------------------------+
| Marital Status        |                    |
+-----------------------+---------------------------+
| Denominational Affiliation | [id number]                      |
+-----------------------+---------------------------+
| Race                  | White                     |
+-----------------------+---------------------------+
| Ethnic Group          | Not  or     |
+-----------------------+---------------------------+
 
 
 Author
 
 
+--------------+--------------------------------------------+
| Author       | [facility]  |
|              | [facility] [place]                                |
+--------------+--------------------------------------------+
| Organization | [facility]  |
|              | [facility] [place]                                |
+--------------+--------------------------------------------+
| Address      | Unknown                                    |
+--------------+--------------------------------------------+
| Phone        | Unavailable                                |
+--------------+--------------------------------------------+
 
 
 
 Support
 
 
+-------------+--------------+-------------------+-----------------+
| Name        | Relationship | Address           | Phone           |
+-------------+--------------+-------------------+-----------------+
| Eduar Colbert | ECON         | [address]       | [phone] |
|             |              | [place]   |                 |
|             |              | [address]             |                 |
+-------------+--------------+-------------------+-----------------+
 
 
 
 
 Care Team Providers
 
 
+-------------------------+------+-----------------+
| Care Team Member Name   | Role | Phone           |
+-------------------------+------+-----------------+
| Tobin Alex MD | PCP  | [phone] |
+-------------------------+------+-----------------+
 
 
 
 Reason for Visit
 
 
+-------------+----------------------------+
| Reason      | Comments                   |
+-------------+----------------------------+
| New Patient | Blood blister under tongue |
+-------------+----------------------------+
 Evaluate & Treat (Routine)
 
+--------+--------+---------------+--------------+--------------+---------------+
| Status | Reason | Specialty     | Diagnoses /  | Referred By  | Referred To   |
|        |        |               | Procedures   | Contact      | Contact       |
+--------+--------+---------------+--------------+--------------+---------------+
| Closed |        | Otolaryngolog |   Diagnoses  |   Sitz,      |   Pablito Nava |
|        |        | y             |  blood       | Tobin      |  [address]  |
|        |        |               | ce      | [address]   | [address]     |
|        |        |               | under        | [address]         | [address]  [place]  |
|        |        |               | tounge/pt/st | [place]    | [place]     |
|        |        |               | [employer] | [address]        | [address]  Phone: |
|        |        |               | tz referred  | Yehuda,   |  [phone] |
|        |        |               |  Procedures  | OR           |   Fax:        |
|        |        |               |  NEW PATIENT | [address]   | [phone]  |
|        |        |               |              | Phone:       |               |
|        |        |               |              | [phone] |               |
|        |        |               |              |   Fax:       |               |
|        |        |               |              | [phone] |               |
+--------+--------+---------------+--------------+--------------+---------------+
 
 
 
 
 Encounter Details
 
 
+--------+---------+----------------------+----------------------+----------------------+
| Date   | Type    | Department           | Care Team            | Description          |
+--------+---------+----------------------+----------------------+----------------------+
| / | Office  |   [facility]          |   Pablito Nava MD  | Allergic rhinitis,   |
|    | Visit   | OTOLARYNGOLOGY  [address]  |  [address]  [address] | cause unspecified    |
|        |         | [address]  [address]  |  [address]  [place]  | (Primary Dx);        |
|        |         |  [place]     | [address] [phone]  | Neoplasm of          |
|        |         | [address]           |  [phone] (Fax)  | uncertain behavior   |
|        |         | [phone]         |                      | of lip, oral cavity, |
|        |         |                      |                      |  and pharynx         |
+--------+---------+----------------------+----------------------+----------------------+
 
 
 
 
 Social History
 
 
+---------------+-------+-----------+--------+------+
| Tobacco Use   | Types | Packs/Day | Years  | Date |
|               |       |           | Used   |      |
+---------------+-------+-----------+--------+------+
| Former Smoker |       |           |        |      |
+---------------+-------+-----------+--------+------+
 
 
 
+------------------------+
| Comments: 10 years ago |
+------------------------+
 
 
 
+-------------+-------------+---------+----------+
| Alcohol Use | Drinks/Week | oz/Week | Comments |
+-------------+-------------+---------+----------+
| Not Asked   |             |         |          |
+-------------+-------------+---------+----------+
 
 
 
+------------------+---------------+
| Sex Assigned at  | Date Recorded |
| Birth            |               |
+------------------+---------------+
| Not on file      |               |
+------------------+---------------+
 documented as of this encounter
 
 Last Filed Vital Signs
 
 
+-------------------+------------------+----------------------+----------+
| Vital Sign        | Reading          | Time Taken           | Comments |
+-------------------+------------------+----------------------+----------+
| Blood Pressure    | -                | -                    |          |
+-------------------+------------------+----------------------+----------+
| Pulse             | 60               | 2014  9:16 AM  |          |
|                   |                  | PST                  |          |
+-------------------+------------------+----------------------+----------+
| Temperature       | -                | -                    |          |
+-------------------+------------------+----------------------+----------+
| Respiratory Rate  | -                | -                    |          |
+-------------------+------------------+----------------------+----------+
| Oxygen Saturation | -                | -                    |          |
+-------------------+------------------+----------------------+----------+
| Inhaled Oxygen    | -                | -                    |          |
| Concentration     |                  |                      |          |
+-------------------+------------------+----------------------+----------+
| Weight            | 61.2 kg (135 lb) | 2014  9:16 AM  |          |
|                   |                  | PST                  |          |
+-------------------+------------------+----------------------+----------+
| Height            | 170.2 cm (5' 7") | 2014  9:16 AM  |          |
|                   |                  | PST                  |          |
 
+-------------------+------------------+----------------------+----------+
| Body Mass Index   | 21.14            | 2014  9:16 AM  |          |
|                   |                  | PST                  |          |
+-------------------+------------------+----------------------+----------+
 documented in this encounter
 
 Progress Notes
 Pablito Nava MD - 2014 12:13 PM PSTSee dictation #[id number] signed by Alexis Nava MD at 2014 12:18 PM Pablito Garrido MD - 2014 12:00 AM [facility]
 ENT AND AUDIOLOGY
 [address]
 [place] [address]
 [phone]  FAX: [phone]
 
 OFFICE VISIT
 
 NEW PATIENT VISIT
 
 The patient comes in with multiple concerns. The number one is she has the history of MRSA 
in  her nose. It is always a little irritable,. It is drippy and it runs.  She is concerned 
if it  may be back, although she has had 2 negative cultures that show that it is not presen
t. She has  a small blood blister on the area under her tongue on the left side. This occurr
ed here back 3  or 4 days ago and would like to have that examined. She has a couple of grow
ths on the right  cheek that she often bites on. She also at times her tongue will get irrit
ated and have a strip  along the edge that turns whitish in its appearance. It comes and goe
s and seems to be  aggravated if she takes some kind of acidy type of foods. No other compla
ints at the current  time.
 
 EXAMINATION
 GENERAL: Examination shows an alert 64-year-old female patient. She is communicating well. 
Her  voice quality was good.
 HEENT: Skin of the face, nose, and ears all appear to be healthy. Parotid and submandibular
  glands were smooth. Ear canals are open, they are clean. Drums were clear. Facial movement
  symmetrical. Nasal passages: There is no obstruction, no mass or lesion, no pustules. She 
has  a little red on the outside where she continues to drip on a regular basis.  NECK: Smoo
th. There are no masses or lymphadenopathy. Thyroid gland was smooth and trachea is  midline
.
 
 Her tongue was very normal-appearing at the current time. Underneath the tongue on the  Wha
rton's duct on the left-hand side there is a small bruise and question if perhaps a stone  h
ad come through this area. It was palpated and no stone was felt to be there currently. Flui
d  seems to come out of it normally. She has 2 traumatic areas where there is a fibrous type
 of  growth off the buccal mucosa on the right-hand side. Both of these are in areas where s
he would  tend to bite on it. She also has a fresh bite area in the left buccal mucosa.
 
 IMPRESSION
 1. VASOMOTOR RHINORRHEA.
 
 2. FLOOR OF THE MOUTH CONTUSION.
 
 3. TWO GROWTHS ON THE RIGHT BUCCAL MUCOSAL AREA  THAT APPEAR TO BE BENIGN. She desires to h
ave  them removed and this is scheduled accordingly.
 
 She was advised to let the bruised area heal and see if anything else continues. She is giv
en a  prescription of Atrovent to dry up the drippy nose to see if this will help. When she 
is seen  back to have the growth removed in her mouth then it will be an indication if the A
trovent is  working and the blister area will be reexamined. She is advised to use some yogu
rt with active  bacteria to try to keep her mouth in balance and hopefully whatever is going
 on with the tongue  may well be related to yeast.
 
 
 Pablito Nava MD
 
 D:  2014   12:17
 T:  2014   14:51
 GM / PAP
 JOB #:  [id number] signed by Pablito Nava MD at 12/15/2014  8:25 AM PSTdocumentnemo
d in this encounter
 
 Plan of Treatment
 
 
+--------+---------+-----------+----------------------+-------------+
| Date   | Type    | Specialty | Care Team            | Description |
+--------+---------+-----------+----------------------+-------------+
| 10/12/ | Office  | Neurology |   Jamin Brooks MD  [address] |             |
|    | Visit   |           |  [email or web address]      |             |
|        |         |           | [place]  |             |
|        |         |           | [place] [address]             |             |
|        |         |           | [phone]         |             |
|        |         |           | [phone] (Fax)   |             |
+--------+---------+-----------+----------------------+-------------+
 documented as of this encounter
 
 Visit Diagnoses
 
 
+-------------------------------------------------------------------+
| Diagnosis                                                         |
+-------------------------------------------------------------------+
|   Allergic rhinitis, cause unspecified - Primary                  |
+-------------------------------------------------------------------+
|   Neoplasm of uncertain behavior of lip, oral cavity, and pharynx |
+-------------------------------------------------------------------+
 documented in this encounter

## 2020-09-21 NOTE — XMS
Encounter Summary
  Created on: 2020
 
 SayraSusana
 External Reference #: [id number]
 : 48
 Sex: Female
 
 Demographics
 
 
+-----------------------+---------------------------+
| Address               | [address]            |
|                       | [place]  [address] |
+-----------------------+---------------------------+
| Home Phone            | [phone]           |
+-----------------------+---------------------------+
| Preferred Language    | Unknown                   |
+-----------------------+---------------------------+
| Marital Status        |                    |
+-----------------------+---------------------------+
| Orthodoxy Affiliation | 1073                      |
+-----------------------+---------------------------+
| Race                  | White                     |
+-----------------------+---------------------------+
| Ethnic Group          | Not  or     |
+-----------------------+---------------------------+
 
 
 Author
 
 
+--------------+--------------------------------------------+
| Author       | [facility]  |
|              | [facility] [place]                                |
+--------------+--------------------------------------------+
| Organization | [facility]  |
|              | [facility] [place]                                |
+--------------+--------------------------------------------+
| Address      | Unknown                                    |
+--------------+--------------------------------------------+
| Phone        | Unavailable                                |
+--------------+--------------------------------------------+
 
 
 
 Support
 
 
+-------------+--------------+-------------------+-----------------+
| Name        | Relationship | Address           | Phone           |
+-------------+--------------+-------------------+-----------------+
| Eduar Colbert | ECON         | [address]       | [phone] |
|             |              | [place]   |                 |
|             |              | [id number]             |                 |
+-------------+--------------+-------------------+-----------------+
 
 
 
 
 Care Team Providers
 
 
+-------------------------+------+-----------------+
| Care Team Member Name   | Role | Phone           |
+-------------------------+------+-----------------+
| Tobin Alex MD | PCP  | [phone] |
+-------------------------+------+-----------------+
 
 
 
 Reason for Referral
 Diagnostic/Screening (Routine)
 
+--------+--------+-----------+--------------+--------------+---------------+
| Status | Reason | Specialty | Diagnoses /  | Referred By  | Referred To   |
|        |        |           | Procedures   | Contact      | Contact       |
+--------+--------+-----------+--------------+--------------+---------------+
| Closed |        | Radiology |   Diagnoses  |   Lee,   |   [facility] |
|        |        |           |  Bone lesion | Juan Carlos CHARLES MD   |  Medicine     |
|        |        |           |   Abnormal   | [address] | [address]  |
|        |        |           | MRI          |   [place]      |  [place], |
|        |        |           | Procedures   | [place], [place]    |  [place]           |
|        |        |           | NM Bone Scan | [address]        | [address]    |
|        |        |           |  3 Phase     | Phone:       | Phone:        |
|        |        |           |              | [phone] | [phone]  |
|        |        |           |              |   Fax:       |  Fax:         |
|        |        |           |              | [phone] | [phone]  |
+--------+--------+-----------+--------------+--------------+---------------+
 
 
 
 
 Reason for Visit
 Diagnostic/Screening (Routine)
 
+--------+--------+-----------+--------------+--------------+---------------+
| Status | Reason | Specialty | Diagnoses /  | Referred By  | Referred To   |
|        |        |           | Procedures   | Contact      | Contact       |
+--------+--------+-----------+--------------+--------------+---------------+
| Closed |        | Radiology |   Diagnoses  |   [place],   |   [facility] |
|        |        |           |  Bone lesion | Juan Carlos CHARLES MD   |  Medicine     |
|        |        |           |   Abnormal   | [address] | [address]  |
|        |        |           | MRI          |   [place]      |  [place], |
|        |        |           | Procedures   | [place], [place]    |  [place]           |
|        |        |           | NM Bone Scan | [address]        | [address]    |
|        |        |           |  3 Phase     | Phone:       | Phone:        |
|        |        |           |              | [phone] | [phone]  |
|        |        |           |              |   Fax:       |  Fax:         |
|        |        |           |              | [phone] | [phone]  |
+--------+--------+-----------+--------------+--------------+---------------+
 
 
 
 
 Encounter Details
 
 
+--------+-----------+----------------------+----------------------+---------------+
 
| Date   | Type      | Department           | Care Team            | Description   |
+--------+-----------+----------------------+----------------------+---------------+
| / | Hospital  |   [facility] |   Juan Carlos Howard,   | Bone lesion;  |
| 2018   | Encounter |  [facility]     | MD  [address]     | Abnormal MRI  |
|        |           | MEDICINE  [address]      | [place], [place]      |               |
|        |           | [place]  [place], | [address]  [phone]  |               |
|        |           |  [place] [address]       |  [phone] (Fax)  |               |
|        |           | [phone]         |                      |               |
+--------+-----------+----------------------+----------------------+---------------+
 
 
 
 Social History
 
 
+---------------+------------+-----------+--------+------------------+
| Tobacco Use   | Types      | Packs/Day | Years  | Date             |
|               |            |           | Used   |                  |
+---------------+------------+-----------+--------+------------------+
| Former Smoker | Cigarettes | 1.5       | 5      | Quit: 1979 |
+---------------+------------+-----------+--------+------------------+
 
 
 
+---------------------+---+---+---+
| Smokeless Tobacco:  |   |   |   |
| Never Used          |   |   |   |
+---------------------+---+---+---+
 
 
 
+-------------+----------------------+---------+------------------+
| Alcohol Use | Drinks/Week          | oz/Week | Comments         |
+-------------+----------------------+---------+------------------+
| Yes         |   1 Shots of liquor  | 1.0     | "social drinker" |
|             |  0 Standard drinks   |         |                  |
|             | or equivalent        |         |                  |
+-------------+----------------------+---------+------------------+
 
 
 
+------------------+---------------+
| Sex Assigned at  | Date Recorded |
| Birth            |               |
+------------------+---------------+
| Not on file      |               |
+------------------+---------------+
 documented as of this encounter
 
 Medications at Time of Discharge
 
 
+----------------------+----------------------+-----------+---------+----------+-----------+
| Medication           | Sig                  | Dispensed | Refills | Start    | End Date  |
|                      |                      |           |         | Date     |           |
+----------------------+----------------------+-----------+---------+----------+-----------+
|                      | Take 1,500 mg by     |           | 0       |          |           |
| Calcium-Magnesium-Vi | mouth.               |           |         |          |           |
| tamin D (CITRACAL    |                      |           |         |          |           |
| SLOW RELEASE)        |                      |           |         |          |           |
 
| 600-           |                      |           |         |          |           |
| MG-MG-UNIT TB24      |                      |           |         |          |           |
+----------------------+----------------------+-----------+---------+----------+-----------+
|   cholecalciferol    | Take 400 Units by    |           | 0       | 20 |           |
| (VITAMIN D-3) 400    | mouth Daily.         |           |         | 12       |           |
| units TABS           |                      |           |         |          |           |
+----------------------+----------------------+-----------+---------+----------+-----------+
|   ipratropium        | INSTILL TWO SPRAYS   |   15 mL   | 5       | 20 |           |
| (ATROVENT) 0.06%     | IN EACH NOSTRIL UP   |           |         | 17       |           |
| nasal spray          | TO FOUR TIMES DAILY  |           |         |          |           |
|                      | AS NEEDED            |           |         |          |           |
+----------------------+----------------------+-----------+---------+----------+-----------+
|   Polyethylene       | Apply  to eye Daily  |           | 0       |          |           |
| Glycol 400 (BLINK    | as needed.           |           |         |          |           |
| TEARS OP)            |                      |           |         |          |           |
+----------------------+----------------------+-----------+---------+----------+-----------+
|   alendronate        |                      |           | 12      | 20 |  |
| (FOSAMAX) 70 mg      |                      |           |         | 18       | 0         |
| tablet               |                      |           |         |          |           |
+----------------------+----------------------+-----------+---------+----------+-----------+
|   estradiol          | Place 10 mcg         |           | 0       |          |  |
| (VAGIFEM) 10 mcg     | vaginally as needed. |           |         |          | 0         |
| vaginal tablet       |                      |           |         |          |           |
+----------------------+----------------------+-----------+---------+----------+-----------+
|   Lysine 500 MG CAPS | Take 500 mg by mouth |           | 0       |          |  |
|                      |  Daily.              |           |         |          | 0         |
+----------------------+----------------------+-----------+---------+----------+-----------+
|   metoprolol         | one tablet by mouth  |           | 1       | 20 |  |
| succinate            | daily                |           |         | 17       | 0         |
| (TOPROL-XL) 25 mg 24 |                      |           |         |          |           |
|  hr tablet           |                      |           |         |          |           |
+----------------------+----------------------+-----------+---------+----------+-----------+
|   mupirocin          | Apply topically to   |   22 g    | 1       | 20 |  |
| (BACTROBAN) 2%       | the nose as          |           |         | 15       | 0         |
| ointment             | directed.            |           |         |          |           |
+----------------------+----------------------+-----------+---------+----------+-----------+
|   rivaroxaban        | Take 20 mg by mouth  |           | 0       |          |  |
| (XARELTO) 20 mg      | Daily.               |           |         |          | 0         |
| tablet               |                      |           |         |          |           |
+----------------------+----------------------+-----------+---------+----------+-----------+
|   sertraline         | 1 and /2 tablet by  |           | 0       | 20 |  |
| (ZOLOFT) 50 mg       | mouth daily          |           |         | 12       | 0         |
| tablet               |                      |           |         |          |           |
+----------------------+----------------------+-----------+---------+----------+-----------+
 documented as of this encounter
 
 Plan of Treatment
 
 
+--------+---------+-----------+----------------------+-------------+
| Date   | Type    | Specialty | Care Team            | Description |
+--------+---------+-----------+----------------------+-------------+
| 10/12/ | Office  | Neurology |   Jamin Brooks MD  [address] |             |
| 2020   | Visit   |           |  [facility]      |             |
|        |         |           | [place]  |             |
|        |         |           | [place] [address]             |             |
|        |         |           | [phone]         |             |
|        |         |           | [phone] (Fax)   |             |
+--------+---------+-----------+----------------------+-------------+
 documented as of this encounter
 
 
 Procedures
 
 
+----------------------+--------+-------------+----------------------+----------------------
+
| Procedure Name       | Priori | Date/Time   | Associated Diagnosis | Comments             
|
|                      | ty     |             |                      |                      
|
+----------------------+--------+-------------+----------------------+----------------------
+
| NM BONE SCAN 3 PHASE | Routin | 2018  |   Bone lesion        |   Results for this   
|
|                      | e      | 12:55 PM    | Abnormal MRI         | procedure are in the 
|
|                      |        | PDT         |                      |  results section.    
|
+----------------------+--------+-------------+----------------------+----------------------
+
 documented in this encounter
 
 Results
 NM Bone Scan 3 Phase (2018 12:55 PM PDT)
 
+----------+
| Specimen |
+----------+
|          |
+----------+
 
 
 
+------------------------------------------------------------------------+---------------+
| Narrative                                                              | Performed At  |
+------------------------------------------------------------------------+---------------+
|   EXAM:NM BONE SCAN 3 PHASE     CLINICAL HISTORY: Tibial Bone Lesion-  |   [facility] |
| Evaluate for the presence of other  Lesions, Further Evaluate for      |               |
| Infection VS Neoplasm such as metastasis or  myeloma.     TECHNIQUE:   |               |
| Blood flow and Blood pool images are acquired. 3 hour delayed whole    |               |
| body imaging is performed following the intravenous administration of  |               |
| 26.3  millicuries of technetium 99m MDP.     COMPARISON: 2018      |               |
| radiograph.   2018 MRI     FINDINGS:      Blood flow: Symmetric.  |               |
|     Blood pool: Symmetric.     3 hour delay: Focal area of metabolic   |               |
| activity in the medial right tibial  plateau.   No definite metabolic  |               |
| activity in the superior pole of the patella or  right femur.   There  |               |
| is a small amount of activity in the tibia and femur  arthroplasty     |               |
| components.   This probably falls within and except at range of        |               |
| activity post surgically.   There are degenerative changes in the      |               |
| hands  bilaterally.     IMPRESSION -     Solitary focal metabolic      |               |
| activity in the medial right tibial plateau  corresponding to the      |               |
| plain film and MRI finding.   The pattern of the lesion  morphology on |               |
|  plain film, MRI, and bone scan can be seen with degenerative  related |               |
|  subchondral cystic changes.   It is felt to be unlikely that this     |               |
| represents infection, a metastasis or plasmacytoma.     Dictated and   |               |
| Signed by: Mahin Abebe MD    Electronically signed: 2018     |               |
| 1:50 PM                                                                |               |
+------------------------------------------------------------------------+---------------+
 
 
 
 
+---------------------------------------------------------------------------------+
| Procedure Note                                                                  |
+---------------------------------------------------------------------------------+
|   Darrell, Rad Results In - 2018  1:53 PM PDT  EXAM:NM BONE SCAN 3 PHASE      |
|                                                                                 |
| CLINICAL HISTORY: Tibial Bone Lesion- Evaluate for the presence of other        |
| Lesions, Further Evaluate for Infection VS Neoplasm such as metastasis or       |
| myeloma.                                                                        |
|                                                                                 |
| TECHNIQUE: Blood flow and Blood pool images are acquired. 3 hour delayed whole  |
| body imaging is performed following the intravenous administration of 26.3      |
| millicuries of technetium 99m MDP.                                              |
|                                                                                 |
| COMPARISON: 2018 radiograph.  2018 MRI                                 |
|                                                                                 |
| FINDINGS:                                                                       |
|                                                                                 |
| Blood flow: Symmetric.                                                          |
|                                                                                 |
| Blood pool: Symmetric.                                                          |
|                                                                                 |
| 3 hour delay: Focal area of metabolic activity in the medial right tibial       |
| plateau.  No definite metabolic activity in the superior pole of the patella or |
| right femur.  There is a small amount of activity in the tibia and femur        |
| arthroplasty components.  This probably falls within and except at range of     |
| activity post surgically.  There are degenerative changes in the hands          |
| bilaterally.                                                                    |
|                                                                                 |
| IMPRESSION -                                                                    |
|                                                                                 |
| Solitary focal metabolic activity in the medial right tibial plateau            |
| corresponding to the plain film and MRI finding.  The pattern of the lesion     |
| morphology on plain film, MRI, and bone scan can be seen with degenerative      |
| related subchondral cystic changes.  It is felt to be unlikely that this        |
| represents infection, a metastasis or plasmacytoma.                             |
|                                                                                 |
| Dictated and Signed by: Mahin Abebe MD                                     |
|  Electronically signed: 2018 1:50 PM                                       |
+---------------------------------------------------------------------------------+
 
 
 
+---------------+---------+--------------------+--------------+
| Performing    | Address | City/State/Zipcode | Phone Number |
| Organization  |         |                    |              |
+---------------+---------+--------------------+--------------+
|   [facility] |         |                    |              |
+---------------+---------+--------------------+--------------+
 documented in this encounter
 
 Visit Diagnoses
 
 
+----------------------------------------------------------------------------------+
| Diagnosis                                                                        |
+----------------------------------------------------------------------------------+
|   Bone lesion  Disorder of bone and cartilage, unspecified                       |
+----------------------------------------------------------------------------------+
|   Abnormal MRI  Other nonspecific (abnormal) findings on radiological and other  |
 
| examinations of body structure                                                   |
+----------------------------------------------------------------------------------+
 documented in this encounter
 
 Administered Medications
 
 
+-----------------------------------+--------+----------+----------+------+------+
| Medication Order                  | MAR    | Action   | Dose     | Rate | Site |
|                                   | Action | Date     |          |      |      |
+-----------------------------------+--------+----------+----------+------+------+
|   technetium TC-99M medronate     | Given  | 20 | 26.3     |      |      |
| (MDP) injection 25 millicurie  25 |        | 18  9:44 | millicur |      |      |
|  millicurie, Intravenous, ONCE    |        |  AM PDT  | ies      |      |      |
| PRN, Other, Starting Tue 18  |        |          |          |      |      |
| at 0944, For 1 dose, Nuclear      |        |          |          |      |      |
| Medicine                          |        |          |          |      |      |
+-----------------------------------+--------+----------+----------+------+------+
 
 
 
+---+---+
|   |   |
+---+---+
 documented in this encounter

## 2020-09-21 NOTE — XMS
Encounter Summary
  Created on: 2020
 
 SayraSusana
 External Reference #: [id number]
 : 48
 Sex: Female
 
 Demographics
 
 
+-----------------------+---------------------------+
| Address               | [address]            |
|                       | [place]  [address] |
+-----------------------+---------------------------+
| Home Phone            | [phone]           |
+-----------------------+---------------------------+
| Preferred Language    | Unknown                   |
+-----------------------+---------------------------+
| Marital Status        |                    |
+-----------------------+---------------------------+
| Voodoo Affiliation | 1073                      |
+-----------------------+---------------------------+
| Race                  | White                     |
+-----------------------+---------------------------+
| Ethnic Group          | Not  or     |
+-----------------------+---------------------------+
 
 
 Author
 
 
+--------------+--------------------------------------------+
| Author       | [facility]  |
|              | and [place]                                |
+--------------+--------------------------------------------+
| Organization | [facility]  |
|              | and [place]                                |
+--------------+--------------------------------------------+
| Address      | Unknown                                    |
+--------------+--------------------------------------------+
| Phone        | Unavailable                                |
+--------------+--------------------------------------------+
 
 
 
 Support
 
 
+-------------+--------------+-------------------+-----------------+
| Name        | Relationship | Address           | Phone           |
+-------------+--------------+-------------------+-----------------+
| Eduar Colbert | ECON         | [address]       | [phone] |
|             |              | [place]   |                 |
|             |              | [address]             |                 |
+-------------+--------------+-------------------+-----------------+
 
 
 
 
 Care Team Providers
 
 
+-----------------------+------+-------------+
| Care Team Member Name | Role | Phone       |
+-----------------------+------+-------------+
 PCP  | Unavailable |
+-----------------------+------+-------------+
 
 
 
 Encounter Details
 
 
+--------+----------+----------------------+----------------------+-------------+
| Date   | Type     | Department           | Care Team            | Description |
+--------+----------+----------------------+----------------------+-------------+
| / | Abstract |   [facility]  |   [facility] |             |
|    |          | Conversion Location  |  SR                  |             |
|        |          |  [address]         |                      |             |
|        |          | [place], [place]         |                      |             |
|        |          | [address]           |                      |             |
|        |          | [phone]         |                      |             |
+--------+----------+----------------------+----------------------+-------------+
 
 
 
 Social History
 
 
+----------------+-------+-----------+--------+------+
| Tobacco Use    | Types | Packs/Day | Years  | Date |
|                |       |           | Used   |      |
+----------------+-------+-----------+--------+------+
| Never Assessed |       |           |        |      |
+----------------+-------+-----------+--------+------+
 
 
 
+------------------+---------------+
| Sex Assigned at  | Date Recorded |
| Birth            |               |
+------------------+---------------+
| Not on file      |               |
+------------------+---------------+
 documented as of this encounter
 
 Last Filed Vital Signs
 
 
+-------------------+------------------+----------------------+----------+
| Vital Sign        | Reading          | Time Taken           | Comments |
+-------------------+------------------+----------------------+----------+
| Blood Pressure    | 130/88           | 2010 12:00 AM  |          |
|                   |                  | PST                  |          |
+-------------------+------------------+----------------------+----------+
| Pulse             | -                | -                    |          |
+-------------------+------------------+----------------------+----------+
| Temperature       | -                | -                    |          |
 
+-------------------+------------------+----------------------+----------+
| Respiratory Rate  | -                | -                    |          |
+-------------------+------------------+----------------------+----------+
| Oxygen Saturation | -                | -                    |          |
+-------------------+------------------+----------------------+----------+
| Inhaled Oxygen    | -                | -                    |          |
| Concentration     |                  |                      |          |
+-------------------+------------------+----------------------+----------+
| Weight            | 61.2 kg (135 lb) | 2010 12:00 AM  |          |
|                   |                  | PST                  |          |
+-------------------+------------------+----------------------+----------+
| Height            | 170.2 cm (5' 7") | 2009 12:00 AM  |          |
|                   |                  | PST                  |          |
+-------------------+------------------+----------------------+----------+
| Body Mass Index   | 21.14            | 2009 12:00 AM  |          |
|                   |                  | PST                  |          |
+-------------------+------------------+----------------------+----------+
 documented in this encounter
 
 Plan of Treatment
 
 
+--------+---------+-----------+----------------------+-------------+
| Date   | Type    | Specialty | Care Team            | Description |
+--------+---------+-----------+----------------------+-------------+
| 10/12/ | Office  | Neurology |   Jamin Brooks MD  [address] |             |
| 2020   | Visit   |           |  [address]      |             |
|        |         |           | [address]  [place],  |             |
|        |         |           | [place] [address]             |             |
|        |         |           | [phone]         |             |
|        |         |           | [phone] (Fax)   |             |
+--------+---------+-----------+----------------------+-------------+
 documented as of this encounter
 
 Procedures
 
 
+--------------------+--------+-------------+----------------------+----------------------+
| Procedure Name     | Priori | Date/Time   | Associated Diagnosis | Comments             |
|                    | ty     |             |                      |                      |
+--------------------+--------+-------------+----------------------+----------------------+
| ENDOSCOPY, COLON,  | Routin | 2011  |                      |   Results for this   |
| DIAGNOSTIC         | e      | 12:00 AM    |                      | procedure are in the |
|                    |        | PST         |                      |  results section.    |
+--------------------+--------+-------------+----------------------+----------------------+
 documented in this encounter
 
 Results
 ENDOSCOPY, COLON, DIAGNOSTIC (2011 12:00 AM PST)
 
+----------+
| Specimen |
+----------+
|          |
+----------+
 
 
 
+-----------+--------------+
| Narrative | Performed At |
 
+-----------+--------------+
|           |              |
+-----------+--------------+
 documented in this encounter
 
 Visit Diagnoses
 Not on filedocumented in this encounter

## 2020-09-21 NOTE — XMS
Encounter Summary
  Created on: 2020
 
 SayraSusana
 External Reference #: [id number]
 : 48
 Sex: Female
 
 Demographics
 
 
+-----------------------+---------------------------+
| Address               | [address]            |
|                       | [place]  [address] |
+-----------------------+---------------------------+
| Home Phone            | [phone]           |
+-----------------------+---------------------------+
| Preferred Language    | Unknown                   |
+-----------------------+---------------------------+
| Marital Status        |                    |
+-----------------------+---------------------------+
| Mormonism Affiliation | 1073                      |
+-----------------------+---------------------------+
| Race                  | White                     |
+-----------------------+---------------------------+
| Ethnic Group          | Not  or     |
+-----------------------+---------------------------+
 
 
 Author
 
 
+--------------+--------------------------------------------+
| Author       | [facility]  |
|              | and [place]                                |
+--------------+--------------------------------------------+
| Organization | [facility]  |
|              | and [place]                                |
+--------------+--------------------------------------------+
| Address      | Unknown                                    |
+--------------+--------------------------------------------+
| Phone        | Unavailable                                |
+--------------+--------------------------------------------+
 
 
 
 Support
 
 
+-------------+--------------+-------------------+-----------------+
| Name        | Relationship | Address           | Phone           |
+-------------+--------------+-------------------+-----------------+
| Eduar Colbert | ECON         | [address]       | [phone] |
|             |              | [place]   |                 |
|             |              | [address]             |                 |
+-------------+--------------+-------------------+-----------------+
 
 
 
 
 Care Team Providers
 
 
+-----------------------+------+-------------+
| Care Team Member Name | Role | Phone       |
+-----------------------+------+-------------+
 PCP  | Unavailable |
+-----------------------+------+-------------+
 
 
 
 Encounter Details
 
 
+--------+-----------+----------------------+-----------+-------------+
| Date   | Type      | Department           | Care Team | Description |
+--------+-----------+----------------------+-----------+-------------+
| 10/13/ | Hospital  |   [facility] |           |             |
|    | Encounter |  MED CTR XRAY  [address] |           |             |
|        |           |  [address]       |           |             |
|        |           | [place] [address] |           |             |
|        |           |   [phone]       |           |             |
+--------+-----------+----------------------+-----------+-------------+
 
 
 
 Social History
 
 
+----------------+-------+-----------+--------+------+
| Tobacco Use    | Types | Packs/Day | Years  | Date |
|                |       |           | Used   |      |
+----------------+-------+-----------+--------+------+
| Never Assessed |       |           |        |      |
+----------------+-------+-----------+--------+------+
 
 
 
+------------------+---------------+
| Sex Assigned at  | Date Recorded |
| Birth            |               |
+------------------+---------------+
| Not on file      |               |
+------------------+---------------+
 documented as of this encounter
 
 Plan of Treatment
 
 
+--------+---------+-----------+----------------------+-------------+
| Date   | Type    | Specialty | Care Team            | Description |
+--------+---------+-----------+----------------------+-------------+
| 10/12/ | Office  | Neurology |   Jamin Brooks MD  [address] |             |
| 2020   | Visit   |           |  [address]      |             |
|        |         |           | [place]  |             |
|        |         |           | [place] [address]             |             |
|        |         |           | [phone]         |             |
|        |         |           | [phone] (Fax)   |             |
+--------+---------+-----------+----------------------+-------------+
 
 documented as of this encounter
 
 Visit Diagnoses
 Not on filedocumented in this encounter

## 2020-09-21 NOTE — XMS
Encounter Summary
  Created on: 2020
 
 SayraSusana
 External Reference #: [id number]
 : 48
 Sex: Female
 
 Demographics
 
 
+-----------------------+---------------------------+
| Address               | [address]            |
|                       | [place]  [address] |
+-----------------------+---------------------------+
| Home Phone            | [phone]           |
+-----------------------+---------------------------+
| Preferred Language    | Unknown                   |
+-----------------------+---------------------------+
| Marital Status        |                    |
+-----------------------+---------------------------+
| Pentecostalism Affiliation | 1073                      |
+-----------------------+---------------------------+
| Race                  | White                     |
+-----------------------+---------------------------+
| Ethnic Group          | Not  or     |
+-----------------------+---------------------------+
 
 
 Author
 
 
+--------------+--------------------------------------------+
| Author       | [facility]  |
|              | and [place]                                |
+--------------+--------------------------------------------+
| Organization | [facility]  |
|              | and [place]                                |
+--------------+--------------------------------------------+
| Address      | Unknown                                    |
+--------------+--------------------------------------------+
| Phone        | Unavailable                                |
+--------------+--------------------------------------------+
 
 
 
 Support
 
 
+-------------+--------------+-------------------+-----------------+
| Name        | Relationship | Address           | Phone           |
+-------------+--------------+-------------------+-----------------+
| Eduar Colbert | ECON         | [address]       | [phone] |
|             |              | [place]   |                 |
|             |              | [address]             |                 |
+-------------+--------------+-------------------+-----------------+
 
 
 
 
 Care Team Providers
 
 
+-------------------------+------+-----------------+
| Care Team Member Name   | Role | Phone           |
+-------------------------+------+-----------------+
| Tobin Alex MD | PCP  | [phone] |
+-------------------------+------+-----------------+
 
 
 
 Reason for Visit
 
 
+--------+--------+------------------+
| Reason | Onset  | Comments         |
|        | Date   |                  |
+--------+--------+------------------+
| Other  | / | minor procedure  |
|        |    |                  |
+--------+--------+------------------+
 
 
 
 Encounter Details
 
 
+--------+-----------+----------------------+----------------------+---------------+
| Date   | Type      | Department           | Care Team            | Description   |
+--------+-----------+----------------------+----------------------+---------------+
| / | Telephone |   [facility] [place]          |   Pablito Nava MD  | Other (minor  |
| 2015   |           | OTOLARYNGOLOGY  [address]  |  [address]  [place] | procedure )   |
|        |           | [address] [facility] [address]  |  [address]  [place]  |               |
|        |           |  [place]     | [address] [phone]  |               |
|        |           | [address]           |  [phone] (Fax)  |               |
|        |           | [phone]         |                      |               |
+--------+-----------+----------------------+----------------------+---------------+
 
 
 
 Social History
 
 
+---------------+-------+-----------+--------+------+
| Tobacco Use   | Types | Packs/Day | Years  | Date |
|               |       |           | Used   |      |
+---------------+-------+-----------+--------+------+
| Former Smoker |       |           |        |      |
+---------------+-------+-----------+--------+------+
 
 
 
+------------------------+
| Comments: 10 years ago |
+------------------------+
 
 
 
+-------------+-------------+---------+----------+
 
| Alcohol Use | Drinks/Week | oz/Week | Comments |
+-------------+-------------+---------+----------+
| Not Asked   |             |         |          |
+-------------+-------------+---------+----------+
 
 
 
+------------------+---------------+
| Sex Assigned at  | Date Recorded |
| Birth            |               |
+------------------+---------------+
| Not on file      |               |
+------------------+---------------+
 documented as of this encounter
 
 Miscellaneous Notes
 Telephone Encounter - Michelle Lorenzo - 2015  8:06 AM PSTPatient called to cancel mi
nor procedure  due to illness. Will call back to reschedule. Electronically signed by LUIS MANUEL Lorenzo at 2015  8:08 AM PSTdocumented in this encounter
 
 Plan of Treatment
 
 
+--------+---------+-----------+----------------------+-------------+
| Date   | Type    | Specialty | Care Team            | Description |
+--------+---------+-----------+----------------------+-------------+
| 10/12/ | Office  | Neurology |   Jamin Brooks MD  [address] |             |
| 2020   | Visit   |           |  [address]      |             |
|        |         |           | [place],  |             |
|        |         |           | [place] [address]             |             |
|        |         |           | [phone]         |             |
|        |         |           | [phone] (Fax)   |             |
+--------+---------+-----------+----------------------+-------------+
 documented as of this encounter
 
 Visit Diagnoses
 Not on filedocumented in this encounter

## 2020-09-21 NOTE — XMS
Encounter Summary
  Created on: 2020
 
 SayraSusana
 External Reference #: [id number]
 : 48
 Sex: Female
 
 Demographics
 
 
+-----------------------+---------------------------+
| Address               | [address]            |
|                       | [place]  [address] |
+-----------------------+---------------------------+
| Home Phone            | [phone]           |
+-----------------------+---------------------------+
| Preferred Language    | Unknown                   |
+-----------------------+---------------------------+
| Marital Status        |                    |
+-----------------------+---------------------------+
| Yazdanism Affiliation | [id number]                      |
+-----------------------+---------------------------+
| Race                  | White                     |
+-----------------------+---------------------------+
| Ethnic Group          | Not  or     |
+-----------------------+---------------------------+
 
 
 Author
 
 
+--------------+--------------------------------------------+
| Author       | [facility]  |
|              | and [place]                                |
+--------------+--------------------------------------------+
| Organization | [facility]  |
|              | and [place]                                |
+--------------+--------------------------------------------+
| Address      | Unknown                                    |
+--------------+--------------------------------------------+
| Phone        | Unavailable                                |
+--------------+--------------------------------------------+
 
 
 
 Support
 
 
+-------------+--------------+-------------------+-----------------+
| Name        | Relationship | Address           | Phone           |
+-------------+--------------+-------------------+-----------------+
| Eduar Colbert | ECON         | [address]       | [phone] |
|             |              | [place]   |                 |
|             |              | [id number]             |                 |
+-------------+--------------+-------------------+-----------------+
 
 
 
 
 Care Team Providers
 
 
+-------------------------+------+-----------------+
| Care Team Member Name   | Role | Phone           |
+-------------------------+------+-----------------+
| Tobin Alex MD | PCP  | [phone] |
+-------------------------+------+-----------------+
 
 
 
 Reason for Referral
 Diagnostic/Screening (Routine)
 
+--------+--------+-----------+--------------+--------------+---------------+
| Status | Reason | Specialty | Diagnoses /  | Referred By  | Referred To   |
|        |        |           | Procedures   | Contact      | Contact       |
+--------+--------+-----------+--------------+--------------+---------------+
| Closed |        | Radiology |   Diagnoses  |   Harri,     |   [facility]     |
|        |        |           |  Weight      | Mahin CHARLES MD  | [address]  |
|        |        |           | loss,        |  [address]       |  [place], |
|        |        |           | unintentiona | [address], [place]  |  [place]           |
|        |        |           | l  Diarrhea, | [address]  [place]   | [address]    |
|        |        |           |  unspecified | [place], [place]    | Phone:        |
|        |        |           |  type        | [id number]        | [phone]  |
|        |        |           | Procedures   | Phone:       |  Fax:         |
|        |        |           | MRI          | [phone] | [phone]  |
|        |        |           | Enterography |   Fax:       |               |
|        |        |           |  w wo        | [phone] |               |
|        |        |           | Contrast  AL |              |               |
|        |        |           |  MRI,        |              |               |
|        |        |           | ABDOMEN,     |              |               |
|        |        |           | COMBO  AL    |              |               |
|        |        |           | MRI, PELVIS, |              |               |
|        |        |           |  COMBO       |              |               |
+--------+--------+-----------+--------------+--------------+---------------+
 
 
 
 
 Reason for Visit
 
 
+----------------------+-----------+
| Reason               | Comments  |
+----------------------+-----------+
| Follow-up(Procedure) | 2017 |
+----------------------+-----------+
| Diarrhea             |           |
+----------------------+-----------+
 
 
 
 Encounter Details
 
 
+--------+---------+----------------------+----------------------+------------------+
| Date   | Type    | Department           | Care Team            | Description      |
+--------+---------+----------------------+----------------------+------------------+
 
| / | Office  |   [facility]          |   Mahin Leiva MD | Weight loss,     |
|    | Visit   | GASTROENTEROLOGY     |   [address], [place]  | unintentional    |
|        |         | [address] [place]  | [address]  [place] [address] | (Primary Dx);    |
|        |         | [address] |  [id number] [phone] | Diarrhea,        |
|        |         |  [address]          |   [phone] (Fax) | unspecified type |
|        |         | [phone]         |                      |                  |
+--------+---------+----------------------+----------------------+------------------+
 
 
 
 Social History
 
 
+---------------+------------+-----------+--------+------------------+
| Tobacco Use   | Types      | Packs/Day | Years  | Date             |
|               |            |           | Used   |                  |
+---------------+------------+-----------+--------+------------------+
| Former Smoker | Cigarettes | 1.5       | 5      | Quit: 1979 |
+---------------+------------+-----------+--------+------------------+
 
 
 
+---------------------+---+---+---+
| Smokeless Tobacco:  |   |   |   |
| Never Used          |   |   |   |
+---------------------+---+---+---+
 
 
 
+-------------+----------------------+---------+------------------+
| Alcohol Use | Drinks/Week          | oz/Week | Comments         |
+-------------+----------------------+---------+------------------+
| Yes         |   1 Shots of liquor  | 1.0     | "social drinker" |
|             |  0 Standard drinks   |         |                  |
|             | or equivalent        |         |                  |
+-------------+----------------------+---------+------------------+
 
 
 
+------------------+---------------+
| Sex Assigned at  | Date Recorded |
| Birth            |               |
+------------------+---------------+
| Not on file      |               |
+------------------+---------------+
 documented as of this encounter
 
 Last Filed Vital Signs
 
 
+-------------------+--------------------+----------------------+----------+
| Vital Sign        | Reading            | Time Taken           | Comments |
+-------------------+--------------------+----------------------+----------+
| Blood Pressure    | 101/58             | 2018  2:21 PM  |          |
|                   |                    | PST                  |          |
+-------------------+--------------------+----------------------+----------+
| Pulse             | 58                 | 2018  2:21 PM  |          |
|                   |                    | PST                  |          |
+-------------------+--------------------+----------------------+----------+
| Temperature       | -                  | -                    |          |
 
+-------------------+--------------------+----------------------+----------+
| Respiratory Rate  | 58                 | 2018  2:21 PM  |          |
|                   |                    | PST                  |          |
+-------------------+--------------------+----------------------+----------+
| Oxygen Saturation | -                  | -                    |          |
+-------------------+--------------------+----------------------+----------+
| Inhaled Oxygen    | -                  | -                    |          |
| Concentration     |                    |                      |          |
+-------------------+--------------------+----------------------+----------+
| Weight            | 64 kg (141 lb 1.5  | 2018  2:21 PM  |          |
|                   | oz)                | PST                  |          |
+-------------------+--------------------+----------------------+----------+
| Height            | 170.2 cm (5' 7")   | 2018  2:21 PM  |          |
|                   |                    | PST                  |          |
+-------------------+--------------------+----------------------+----------+
| Body Mass Index   | 22.1               | 2018  2:21 PM  |          |
|                   |                    | PST                  |          |
+-------------------+--------------------+----------------------+----------+
 documented in this encounter
 
 Progress Notes
 Mahin Leiva MD - 2018  2:30 PM PSTFormatting of this note might be different from
 the original.
 Subjective: 
  
 Patient ID: Susana Colbert is a 67 y.o. female.
 
 The patient is seen in follow-up with respect to diarrhea
 
 The patient underwent upper endoscopy and colonoscopy for evaluation of the same 2017.  
Biopsies of the duodenum right and left colon were negative for significant abnormalities co
ntributing to diarrhea.  Campylobacter was positive on stool studies has was lactoferrin.  SAIMA martinez was treated with azithromycin.  She improved but then had recurrence of diarrhea has 
had 2 subsequent courses of azithromycin.  She reports that she believes that her diarrhea i
s getting worse.  Before the treatment of Campylobacter her bowel pattern was somewhat hard 
to determine by history but she had episodes of diarrhea.  However now she'll have diarrhea 
which she describes as multiple loose repetitive stools for 2-3 days rather than just one da
y.  She will have her go a week or so without having any diarrhea.  When she does have diarr
hea she takes Imodium and it may take one or 2 Imodium to resolve with diarrhea.  She's had 
episodes of incontinence has had nocturnal awakening in order to evacuate her bowels.  Patie
nt denies any blood or mucus in the stools has abdominal cramping but denies any overt abdom
inal pain.  No family history of inflammatory bowel disease or IBS immunological phenomenon.
  She denies tachycardia chest or trunk flushing other signs of possible neuroendocrine dysf
unction.  Patient's weight is been stable.  She denies chills or fever.  Patient denies any 
difficulties with urination with holding urine incontinence incomplete evacuation or other s
igns or symptoms of pelvic floor dysfunction.  The patient has been unable to identify any d
ietary factors contributing to her diarrhea emotional stressors etc. she denies taking any c
hange in over-the-counter medications has discontinued fish oil capsules and has decreased h
er probiotic to half a dose 3 times a week as that increases her symptoms of diarrhea
 
 Vitals: 
  18 1421 
 BP: 101/58 
 Pulse: 58 
 Resp: (!) 58 
 PainSc:   0 - No pain 
 
 Allergies 
 Allergen Reactions 
   Moexipril Hydrochloride Shortness Of Breath 
 
   AKA Univasc 
   Metoclopramide Other (See Comments) 
   depression 
   Oxycodone Other (See Comments) 
   "makes her crazy" 
   Propafenone Other (See Comments) 
   Did not work for her Afib, she wants this on her allergy list 
 
 Past Medical History: 
 Diagnosis Date 
   Anomalous atrioventricular excitation 2014 
   Arthritis  
   Atrial fibrillation (HCC) 2014 
   Atrial flutter (HCC)  
   Depression with anxiety 2014 
   Disorder of bone and cartilage, unspecified 2014 
   Esophageal reflux 2014 
   Essential hypertension 2014 
   H/O campylobacteriosis 2017 
   Hyperlipidemia 2014 
   Internal derangement of knee 2014 
   Osteoarthritis  
   Osteopenia 2014 
   Postmenopausal disorder 2014 
   Unspecified essential hypertension 2014 
   Unspecified internal derangement of knee 2014 
 
 Past Surgical History: 
 Procedure Laterality Date 
   ATRIAL ABLATION SURGERY   
  Dr. Wilson 
   CARPAL TUNNEL RELEASE Left  
   CARPAL TUNNEL RELEASE Right  
   CATARACT REMOVAL   
   COLONOSCOPY   
   COLONOSCOPY N/A 2017 
  Procedure: COLONOSCOPY;  Surgeon: Mahin Leiva MD;  Location: [facility] MEDICAL PROCEDURE UNIT
 
   HIP ARTHROSCOPY Left  
   KNEE ARTHROSCOPY Left  
   KNEE JOINT REPLACEMENT Left 2015 
  Dr. Mike 
   TONSILLECTOMY   
   UPPER GASTROINTESTINAL ENDOSCOPY N/A 2017 
  Procedure: EGD;  Surgeon: Mahin Leiva MD;  Location: [facility] MEDICAL PROCEDURE UNIT 
   VAGINA SURGERY   
 
 Family History 
 Problem Relation Age of Onset 
   Heart attack Father  
   Osteoarthritis Father  
   Hypertension Father  
   Muscle disease Father  
   Kidney disease Father  
   Gout Father  
   Osteoporosis Mother  
   Dementia Mother  
   Other (see comment) Mother  
   PAF 
   Hypertension Mother  
 
   Muscle disease Mother  
   Kidney disease Mother  
   Heart attack Mother  
   Cancer Mother  
   Hypertension Other  
   Sibling history 
   Heart attack Other  
   Sibling history 
   Muscle disease Other  
   Sibling history 
   Kidney disease Other  
   Sibling history 
 
 Social History 
 
 Social History 
   Marital status:  
   Spouse name: N/A 
   Number of children: N/A 
   Years of education: N/A 
 
 Occupational History 
   Retired LPN  
 
 Social History Main Topics 
   Smoking status: Former Smoker 
   Packs/day: 1.50 
   Years: 5.00 
   Types: Cigarettes 
   Quit date: 1979 
   Smokeless tobacco: Never Used 
   Alcohol use 0.6 oz/week 
   1 Shots of liquor per week 
    Comment: "social drinker" 
   Drug use: No 
   Sexual activity: Not Asked 
 
 Other Topics Concern 
   None 
 
 Social History Narrative 
   None 
 
 Review of Systems 
 Gastrointestinal: Positive for diarrhea. 
 All other systems reviewed and are negative.
 
 Objective: 
 /58  | Pulse 58  | Resp (!) 58  | Ht 1.702 m (5' 7")  | Wt 64 kg (141 lb 1.5 oz)  | B
MI 22.10 kg/m 
 
 Physical Exam 
 Constitutional: She is oriented to person, place, and time. She appears well-developed and 
well-nourished. No distress. 
 HENT: 
 Head: Normocephalic and atraumatic. 
 Right Ear: External ear normal. 
 Left Ear: External ear normal. 
 Nose: Nose normal. 
 Mouth/Throat: Oropharynx is clear and moist. No oropharyngeal exudate. 
 
 Eyes: Conjunctivae and EOM are normal. Pupils are equal, round, and reactive to light. Righ
t eye exhibits no discharge. Left eye exhibits no discharge. No scleral icterus. 
 Neck: Normal range of motion. Neck supple. No tracheal deviation present. 
 Cardiovascular: Normal rate, regular rhythm, normal heart sounds and intact distal pulses. 
 Exam reveals no gallop and no friction rub.  
 No murmur heard.
 Pulmonary/Chest: Effort normal and breath sounds normal. No stridor. No respiratory distres
s. She has no wheezes. She has no rales. 
 Abdominal: Soft. Bowel sounds are normal. She exhibits no distension and no mass. There is 
no tenderness. There is no rebound and no guarding. 
 Musculoskeletal: Normal range of motion. She exhibits no edema, tenderness or deformity. 
 Neurological: She is alert and oriented to person, place, and time. No cranial nerve defici
t. She exhibits normal muscle tone. Coordination normal. 
 Skin: Skin is warm and dry. No rash noted. She is not diaphoretic. No erythema. No pallor. 
 Psychiatric: She has a normal mood and affect. Her behavior is normal. Judgment and thought
 content normal. 
 Nursing note and vitals reviewed.
 
 Assessment: 
 
 Suspect baseline irritable bowel made worse with Campylobacter infection i.e. postinfectiou
s IBS however nocturnal stooling and incontinence is disconcerting
 
 It was explained to the patient that probiotics are more beneficial isn't constipated type 
bowel changes rather than diarrhea
 
 Plan: 
 
 MR enterography to look for small bowel disease.  At this time do not feel that it is indic
ated to do neuroendocrine workup.  She was told that she continue to take the Imodium on an 
as-needed basis.
 
 Discontinue probiotic
 
 Electronically signed by Mahin Leiva MD at 2018  5:30 PM PSTdocumented in this enc
ounter
 
 Plan of Treatment
 
 
+--------+---------+-----------+----------------------+-------------+
| Date   | Type    | Specialty | Care Team            | Description |
+--------+---------+-----------+----------------------+-------------+
| 10/12/ | Office  | Neurology |   Jamin Brooks MD  [address] |             |
| 2020   | Visit   |           |  [address]      |             |
|        |         |           | [address]  [place]  |             |
|        |         |           | [place] [address]             |             |
|        |         |           | [phone]         |             |
|        |         |           | [phone] (Fax)   |             |
+--------+---------+-----------+----------------------+-------------+
 documented as of this encounter
 
 Results
 MRI Enterography w wo Contrast (2018 10:34 AM PST)
 
+----------+
| Specimen |
+----------+
|          |
+----------+
 
 
 
 
+------------------------------------------------------------------------+---------------+
| Narrative                                                              | Performed At  |
+------------------------------------------------------------------------+---------------+
|   TECHNIQUE: MRI abdomen, enterography protocol with sequences to      |   [facility] |
| include coronal  haste, axial T2, axial T1, coronal T1, and            |               |
| postcontrast coronal and axial  T1-weighted images.   7.5 mL Gadavist  |               |
| was administered intravenously.     CLINICAL INFORMATION: Diarrhea,    |               |
| weight loss     COMPARISON: None available.     FINDINGS:   IMAGE      |               |
| QUALITY:   Small bowel distension: Satisfactory.      BOWEL AND        |               |
| MESENTERY:  Peristalsis: Oral contrast is identified within the right  |               |
| colon.  Bowel wall thickening: None.   Enhancement: Normal.   Fistula: |               |
|  Absent.  Abscess: Absent.  Anal fissure: None.  Adenopathy: None.     |               |
| Mesentery: Normal appearance.  Stomach: Normal.   Duodenum: Normal.    |               |
| Colon: Normal.     OTHER:   Liver: Normal.   Gallbladder: Normal.      |               |
| Pancreas: Normal.   Spleen: Normal.   Adrenals glands: Normal.         |               |
| Kidneys: Nonenhancing bilateral renal cysts.  Reproductive organs:     |               |
| Mild prominent appearance of the endometrium.  Lung bases: Normal.     |               |
| Bones: Normal.        IMPRESSION -   No bowel abnormality identified.  |               |
|     Mild prominent appearance of the endometrium, recommend clinical   |               |
| correlation.   Consider pelvic sonogram for further assessment.        |               |
| Dictated and Signed by: Michele Yanez MD    Electronically signed:  |               |
| 3/2/2018 3:09 PM                                                       |               |
+------------------------------------------------------------------------+---------------+
 
 
 
+------------------------------------------------------------------------------------------+
| Procedure Note                                                                           |
+------------------------------------------------------------------------------------------+
|   Darrell, Rad Results In - 2018  3:12 PM PST  TECHNIQUE: MRI abdomen, enterography    |
| protocol with sequences to include coronalhaste, axial T2, axial T1, coronal T1, and     |
| postcontrast coronal and axialT1-weighted images.  7.5 mL Gadavist was administered      |
| intravenously.CLINICAL INFORMATION: Diarrhea, weight lossCOMPARISON: None                |
| available.FINDINGS: IMAGE QUALITY: Small bowel distension: Satisfactory. BOWEL AND       |
| MESENTERY:Peristalsis: Oral contrast is identified within the right colon.Bowel wall     |
| thickening: None. Enhancement: Normal. Fistula: Absent.Abscess: Absent.Anal fissure:     |
| None.Adenopathy: None.Mesentery: Normal appearance.Stomach: Normal. Duodenum: Normal.    |
| Colon: Normal. OTHER: Liver: Normal. Gallbladder: Normal. Pancreas: Normal. Spleen:      |
| Normal. Adrenals glands: Normal. Kidneys: Nonenhancing bilateral renal                   |
| cysts.Reproductive organs: Mild prominent appearance of the endometrium.Lung bases:      |
| Normal. Bones: Normal.IMPRESSION - No bowel abnormality identified.Mild prominent        |
| appearance of the endometrium, recommend clinical correlation. Consider pelvic sonogram  |
| for further assessment.Dictated and Signed by: Michele Yanez MD  Electronically        |
| signed: 3/2/2018 3:09 PM                                                                 |
|Enhancement: Normal.                                                                      |
|Fistula: Absent.                                                                          |
|Abscess: Absent.                                                                          |
|Anal fissure: None.                                                                       |
|Adenopathy: None.                                                                         |
|Mesentery: Normal appearance.                                                             |
|Stomach: Normal.                                                                          |
|Duodenum: Normal.                                                                         |
|Colon: Normal.                                                                            |
|                                                                                          |
|OTHER:                                                                                    |
|Liver: Normal.                                                                            |
|Gallbladder: Normal.                                                                      |
 
|Pancreas: Normal.                                                                         |
|Spleen: Normal.                                                                           |
|Adrenals glands: Normal.                                                                  |
|Kidneys: Nonenhancing bilateral renal cysts.                                              |
|Reproductive organs: Mild prominent appearance of the endometrium.                        |
|Lung bases: Normal.                                                                       |
|Bones: Normal.                                                                            |
|                                                                                          |
|                                                                                          |
|IMPRESSION -                                                                              |
|No bowel abnormality identified.                                                          |
|                                                                                          |
|Mild prominent appearance of the endometrium, recommend clinical correlation.             |
|Consider pelvic sonogram for further assessment.                                          |
|                                                                                          |
|Dictated and Signed by: Michele Yanez MD                                                |
| Electronically signed: 3/2/2018 3:09 PM                                                  |
+------------------------------------------------------------------------------------------+
 
 
 
+---------------+---------+--------------------+--------------+
| Performing    | Address | City/State/Zipcode | Phone Number |
| Organization  |         |                    |              |
+---------------+---------+--------------------+--------------+
|   [facility] |         |                    |              |
+---------------+---------+--------------------+--------------+
 documented in this encounter
 
 Visit Diagnoses
 
 
+--------------------------------------------------------+
| Diagnosis                                              |
+--------------------------------------------------------+
|   Weight loss, unintentional - Primary  Loss of weight |
+--------------------------------------------------------+
|   Diarrhea, unspecified type                           |
+--------------------------------------------------------+
 documented in this encounter

## 2020-09-21 NOTE — XMS
Encounter Summary
  Created on: 2020
 
 SayraSusana
 External Reference #: [id number]
 : 48
 Sex: Female
 
 Demographics
 
 
+-----------------------+---------------------------+
| Address               | [address]            |
|                       | [place]  [address] |
+-----------------------+---------------------------+
| Home Phone            | [phone]           |
+-----------------------+---------------------------+
| Preferred Language    | Unknown                   |
+-----------------------+---------------------------+
| Marital Status        |                    |
+-----------------------+---------------------------+
| Spiritism Affiliation | 1073                      |
+-----------------------+---------------------------+
| Race                  | White                     |
+-----------------------+---------------------------+
| Ethnic Group          | Not  or     |
+-----------------------+---------------------------+
 
 
 Author
 
 
+--------------+--------------------------------------------+
| Author       | [facility]  |
|              | and [place]                                |
+--------------+--------------------------------------------+
| Organization | [facility]  |
|              | and [place]                                |
+--------------+--------------------------------------------+
| Address      | Unknown                                    |
+--------------+--------------------------------------------+
| Phone        | Unavailable                                |
+--------------+--------------------------------------------+
 
 
 
 Support
 
 
+-------------+--------------+-------------------+-----------------+
| Name        | Relationship | Address           | Phone           |
+-------------+--------------+-------------------+-----------------+
| Eduar Colbert | ECON         | [address]       | [phone] |
|             |              | [place]   |                 |
|             |              | [id number]             |                 |
+-------------+--------------+-------------------+-----------------+
 
 
 
 
 Care Team Providers
 
 
+-------------------------+------+-----------------+
| Care Team Member Name   | Role | Phone           |
+-------------------------+------+-----------------+
| Tobin Alex MD | PCP  | [phone] |
+-------------------------+------+-----------------+
 
 
 
 Reason for Visit
 
 
+-------------------+--------+----------+
| Reason            | Onset  | Comments |
|                   | Date   |          |
+-------------------+--------+----------+
| Medication Refill | / |          |
|                   |    |          |
+-------------------+--------+----------+
 
 
 
 Encounter Details
 
 
+--------+--------+----------------------+----------------------+-------------------+
| Date   | Type   | Department           | Care Team            | Description       |
+--------+--------+----------------------+----------------------+-------------------+
| / | Refill |   [facility] [place]          |   Pablito Nava MD  | Medication Refill |
|    |        | OTOLARYNGOLOGY  [address]  |  [address] [place] [address] |                   |
|        |        | [facility]  |  [address]  |                   |
|        |        |  [place]     | [address] [phone]  |                   |
|        |        | [address]           |  [phone] (Fax)  |                   |
|        |        | [phone]         |                      |                   |
+--------+--------+----------------------+----------------------+-------------------+
 
 
 
 Social History
 
 
+---------------+-------+-----------+--------+------+
| Tobacco Use   | Types | Packs/Day | Years  | Date |
|               |       |           | Used   |      |
+---------------+-------+-----------+--------+------+
| Former Smoker |       |           |        |      |
+---------------+-------+-----------+--------+------+
 
 
 
+---------------------+---+---+---+
| Smokeless Tobacco:  |   |   |   |
| Never Used          |   |   |   |
+---------------------+---+---+---+
 
 
 
 
+------------------------+
| Comments: 10 years ago |
+------------------------+
 
 
 
+-------------+----------------------+---------+------------------+
| Alcohol Use | Drinks/Week          | oz/Week | Comments         |
+-------------+----------------------+---------+------------------+
| Yes         |   0 Standard drinks  | 0.0     | "social drinker" |
|             | or equivalent        |         |                  |
+-------------+----------------------+---------+------------------+
 
 
 
+------------------+---------------+
| Sex Assigned at  | Date Recorded |
| Birth            |               |
+------------------+---------------+
| Not on file      |               |
+------------------+---------------+
 documented as of this encounter
 
 Miscellaneous Notes
 Telephone Encounter - Leilani Infante, Medical Assistant - 2017  4:21 PM Elenita
t called because she out of refills for her nasal spray and she was not aware of it. She was
 seeing if she can get more refills. They are going out of town this week. So she was wonder
ing if she can have refills this time then see him next time if that is fine with him. Told 
her that  is not in the office right now but I will talk to him tomorrow and see wha
t he wants to do. Then we will give her a call back on her cell. Electronically signed by Son Infante Medical Assistant at 2017  4:23 PM PSTdocumented in this encounter
 
 Plan of Treatment
 
 
+--------+---------+-----------+----------------------+-------------+
| Date   | Type    | Specialty | Care Team            | Description |
+--------+---------+-----------+----------------------+-------------+
| 10/12/ | Office  | Neurology |   Jamin Brooks MD  [address] |             |
| 2020   | Visit   |           |  [facility]      |             |
|        |         |           | [place],  |             |
|        |         |           | [place] [address]             |             |
|        |         |           | [phone]         |             |
|        |         |           | [phone] (Fax)   |             |
+--------+---------+-----------+----------------------+-------------+
 documented as of this encounter
 
 Visit Diagnoses
 Not on filedocumented in this encounter

## 2020-09-21 NOTE — XMS
Encounter Summary
  Created on: 2020
 
 SayraSusana
 External Reference #: [id number]
 : 48
 Sex: Female
 
 Demographics
 
 
+-----------------------+---------------------------+
| Address               | [address]            |
|                       | [place]  [address] |
+-----------------------+---------------------------+
| Home Phone            | [phone]           |
+-----------------------+---------------------------+
| Preferred Language    | Unknown                   |
+-----------------------+---------------------------+
| Marital Status        |                    |
+-----------------------+---------------------------+
| Jewish Affiliation | 1073                      |
+-----------------------+---------------------------+
| Race                  | White                     |
+-----------------------+---------------------------+
| Ethnic Group          | Not  or     |
+-----------------------+---------------------------+
 
 
 Author
 
 
+--------------+--------------------------------------------+
| Author       | [facility]  |
|              | and [place]                                |
+--------------+--------------------------------------------+
| Organization | [facility]  |
|              | and [place]                                |
+--------------+--------------------------------------------+
| Address      | Unknown                                    |
+--------------+--------------------------------------------+
| Phone        | Unavailable                                |
+--------------+--------------------------------------------+
 
 
 
 Support
 
 
+-------------+--------------+-------------------+-----------------+
| Name        | Relationship | Address           | Phone           |
+-------------+--------------+-------------------+-----------------+
| Eduar Colbert | ECON         | [address]       | [phone] |
|             |              | [place]   |                 |
|             |              | [address]             |                 |
+-------------+--------------+-------------------+-----------------+
 
 
 
 
 Care Team Providers
 
 
+-------------------------+------+-----------------+
| Care Team Member Name   | Role | Phone           |
+-------------------------+------+-----------------+
| Tobin Alex MD | PCP  | [phone] |
+-------------------------+------+-----------------+
 
 
 
 Reason for Visit
 
 
+------------------+--------+-----------------+
| Reason           | Onset  | Comments        |
|                  | Date   |                 |
+------------------+--------+-----------------+
| Results, Imaging | / | MR enterography |
|                  |    |                 |
+------------------+--------+-----------------+
 
 
 
 Encounter Details
 
 
+--------+-----------+----------------------+----------------------+----------------------+
| Date   | Type      | Department           | Care Team            | Description          |
+--------+-----------+----------------------+----------------------+----------------------+
| / | Telephone |   [facility] [place]          |   Mahin Leiva MD | Results, Imaging (MR |
| 2018   |           | GASTROENTEROLOGY     |   [address]  |  enterography)       |
|        |           | [address] [place]  | [address]  [place], [place] |                      |
|        |           | [address]  [place], [place] |  [address] [phone] |                      |
|        |           |  [address]          |   [phone] (Fax) |                      |
|        |           | [phone]         |                      |                      |
+--------+-----------+----------------------+----------------------+----------------------+
 
 
 
 Social History
 
 
+---------------+------------+-----------+--------+------------------+
| Tobacco Use   | Types      | Packs/Day | Years  | Date             |
|               |            |           | Used   |                  |
+---------------+------------+-----------+--------+------------------+
| Former Smoker | Cigarettes | 1.5       | 5      | Quit: 1979 |
+---------------+------------+-----------+--------+------------------+
 
 
 
+---------------------+---+---+---+
| Smokeless Tobacco:  |   |   |   |
| Never Used          |   |   |   |
+---------------------+---+---+---+
 
 
 
 
+-------------+----------------------+---------+------------------+
| Alcohol Use | Drinks/Week          | oz/Week | Comments         |
+-------------+----------------------+---------+------------------+
| Yes         |   1 Shots of liquor  | 1.0     | "social drinker" |
|             |  0 Standard drinks   |         |                  |
|             | or equivalent        |         |                  |
+-------------+----------------------+---------+------------------+
 
 
 
+------------------+---------------+
| Sex Assigned at  | Date Recorded |
| Birth            |               |
+------------------+---------------+
| Not on file      |               |
+------------------+---------------+
 documented as of this encounter
 
 Miscellaneous Notes
 Telephone Encounter - Nusrat Bustillos RN - 2018  9:44 AM PSTNotified patient th
at MR enterography showed normal small bowel. Did show mild prominent appearance of endometr
ium. She will be seeing Dr Hernández. Will send her copy of this mr enterography. She feels the d
iarrhea is getting better.  She will call if it worsens at which time she will make a follow
-up appointment with Dr. Leiva.Electronically signed by Nusrat Bustillos RN at   9:53 AM PSTTelephone Encounter - Nusrat Bustillos RN - 2018  2:18 PM Enrique avila asking patient to call for results.  MR enterography shows normal small bowel.  Does 
show questionable uterine enlargement.  Dr. Alex will receive a copy of this report.Rachid parker signed by Nusrat Bustillos RN at 2018  2:19 PM PSTdocumented in this encoun
ter
 
 Plan of Treatment
 
 
+--------+---------+-----------+----------------------+-------------+
| Date   | Type    | Specialty | Care Team            | Description |
+--------+---------+-----------+----------------------+-------------+
| 10/12/ | Office  | Neurology |   Jamin Brooks MD  [address] |             |
| 2020   | Visit   |           |  [facility]      |             |
|        |         |           | [place]  |             |
|        |         |           | [place] [address]             |             |
|        |         |           | [phone]         |             |
|        |         |           | [phone] (Fax)   |             |
+--------+---------+-----------+----------------------+-------------+
 documented as of this encounter
 
 Visit Diagnoses
 Not on filedocumented in this encounter

## 2020-09-21 NOTE — XMS
Encounter Summary
  Created on: 2020
 
 SayraSusana
 External Reference #: [id number]
 : 48
 Sex: Female
 
 Demographics
 
 
+-----------------------+---------------------------+
| Address               | [address]            |
|                       | [place]  [address] |
+-----------------------+---------------------------+
| Home Phone            | [phone]           |
+-----------------------+---------------------------+
| Preferred Language    | Unknown                   |
+-----------------------+---------------------------+
| Marital Status        |                    |
+-----------------------+---------------------------+
| [employer] Affiliation | [id number]                      |
+-----------------------+---------------------------+
| Race                  | White                     |
+-----------------------+---------------------------+
| Ethnic Group          | Not  or     |
+-----------------------+---------------------------+
 
 
 Author
 
 
+--------------+--------------------------------------------+
| Author       | [facility]  |
|              | [facility] [place]                                |
+--------------+--------------------------------------------+
| Organization | [facility]  |
|              | [facility] [place]                                |
+--------------+--------------------------------------------+
| Address      | Unknown                                    |
+--------------+--------------------------------------------+
| Phone        | Unavailable                                |
+--------------+--------------------------------------------+
 
 
 
 Support
 
 
+-------------+--------------+-------------------+-----------------+
| Name        | Relationship | Address           | Phone           |
+-------------+--------------+-------------------+-----------------+
| Eduar Colbert | ECON         | [address]       | [phone] |
|             |              | [place]   |                 |
|             |              | [address]             |                 |
+-------------+--------------+-------------------+-----------------+
 
 
 
 
 Care Team Providers
 
 
+-------------------------+------+-----------------+
| Care Team Member Name   | Role | Phone           |
+-------------------------+------+-----------------+
| Tobin Alex MD | PCP  | [phone] |
+-------------------------+------+-----------------+
 
 
 
 Encounter Details
 
 
+--------+-----------+----------------------+----------------------+-------------------+
| Date   | Type      | Department           | Care Team            | Description       |
+--------+-----------+----------------------+----------------------+-------------------+
| / | Hospital  |   [facility] |   Juan Carlos Howard,   | Right knee pain,  |
| 2018   | Encounter |  [facility]  | MD  [address]     | unspecified       |
|        |           |  [address]  [place] | [place], [place]      | chronicity        |
|        |           |  [place]           | [address]  [phone]  |                   |
|        |           | [address]           |  [phone] (Fax)  |                   |
|        |           | [phone]         |                      |                   |
+--------+-----------+----------------------+----------------------+-------------------+
 
 
 
 Social History
 
 
+---------------+------------+-----------+--------+------------------+
| Tobacco Use   | Types      | Packs/Day | Years  | Date             |
|               |            |           | Used   |                  |
+---------------+------------+-----------+--------+------------------+
| Former Smoker | Cigarettes | 1.5       | 5      | Quit: 1979 |
+---------------+------------+-----------+--------+------------------+
 
 
 
+---------------------+---+---+---+
| Smokeless Tobacco:  |   |   |   |
| Never Used          |   |   |   |
+---------------------+---+---+---+
 
 
 
+-------------+----------------------+---------+------------------+
| Alcohol Use | Drinks/Week          | oz/Week | Comments         |
+-------------+----------------------+---------+------------------+
| Yes         |   1 Shots of liquor  | 1.0     | "social drinker" |
|             |  0 Standard drinks   |         |                  |
|             | or equivalent        |         |                  |
+-------------+----------------------+---------+------------------+
 
 
 
+------------------+---------------+
| Sex Assigned at  | Date Recorded |
| Birth            |               |
 
+------------------+---------------+
| Not on file      |               |
+------------------+---------------+
 documented as of this encounter
 
 Medications at Time of Discharge
 
 
+----------------------+----------------------+-----------+---------+----------+-----------+
| Medication           | Sig                  | Dispensed | Refills | Start    | End Date  |
|                      |                      |           |         | Date     |           |
+----------------------+----------------------+-----------+---------+----------+-----------+
|                      | Take 1,500 mg by     |           | 0       |          |           |
| Calcium-Magnesium-Vi | mouth.               |           |         |          |           |
| tamin D (CITRACAL    |                      |           |         |          |           |
| SLOW RELEASE)        |                      |           |         |          |           |
| 600-           |                      |           |         |          |           |
| MG-MG-UNIT TB24      |                      |           |         |          |           |
+----------------------+----------------------+-----------+---------+----------+-----------+
|   cholecalciferol    | Take 400 Units by    |           | 0       | 20 |           |
| (VITAMIN D-3) 400    | mouth Daily.         |           |         | 12       |           |
| units TABS           |                      |           |         |          |           |
+----------------------+----------------------+-----------+---------+----------+-----------+
|   ipratropium        | INSTILL TWO SPRAYS   |   15 mL   | 5       | 20 |           |
| (ATROVENT) 0.06%     | IN EACH NOSTRIL UP   |           |         | 17       |           |
| nasal spray          | TO FOUR TIMES DAILY  |           |         |          |           |
|                      | AS NEEDED            |           |         |          |           |
+----------------------+----------------------+-----------+---------+----------+-----------+
|   Polyethylene       | Apply  to eye Daily  |           | 0       |          |           |
| Glycol 400 (BLINK    | as needed.           |           |         |          |           |
| TEARS OP)            |                      |           |         |          |           |
+----------------------+----------------------+-----------+---------+----------+-----------+
|   alendronate        |                      |           | 12      | 20 |  |
| (FOSAMAX) 70 mg      |                      |           |         | 18       | 0         |
| tablet               |                      |           |         |          |           |
+----------------------+----------------------+-----------+---------+----------+-----------+
|   estradiol          | Place 10 mcg         |           | 0       |          |  |
| (VAGIFEM) 10 mcg     | vaginally as needed. |           |         |          | 0         |
| vaginal tablet       |                      |           |         |          |           |
+----------------------+----------------------+-----------+---------+----------+-----------+
|   Lysine 500 MG CAPS | Take 500 mg by mouth |           | 0       |          |  |
|                      |  Daily.              |           |         |          | 0         |
+----------------------+----------------------+-----------+---------+----------+-----------+
|   metoprolol         | one tablet by mouth  |           | 1       | 20 |  |
| succinate            | daily                |           |         | 17       | 0         |
| (TOPROL-XL) 25 mg 24 |                      |           |         |          |           |
|  hr tablet           |                      |           |         |          |           |
+----------------------+----------------------+-----------+---------+----------+-----------+
|   mupirocin          | Apply topically to   |   22 g    | 1       | 20 |  |
| (BACTROBAN) 2%       | the nose as          |           |         | 15       | 0         |
| ointment             | directed.            |           |         |          |           |
+----------------------+----------------------+-----------+---------+----------+-----------+
|   rivaroxaban        | Take 20 mg by mouth  |           | 0       |          |  |
| (XARELTO) 20 mg      | Daily.               |           |         |          | 0         |
| tablet               |                      |           |         |          |           |
+----------------------+----------------------+-----------+---------+----------+-----------+
|   sertraline         | 1 and  tablet by  |           | 0       | 20 |  |
| (ZOLOFT) 50 mg       | mouth daily          |           |         | 12       | 0         |
| tablet               |                      |           |         |          |           |
+----------------------+----------------------+-----------+---------+----------+-----------+
 
 documented as of this encounter
 
 Plan of Treatment
 
 
+--------+---------+-----------+----------------------+-------------+
| Date   | Type    | Specialty | Care Team            | Description |
+--------+---------+-----------+----------------------+-------------+
| 10/12/ | Office  | Neurology |   Jamin Brooks MD  [address] |             |
| 2020   | Visit   |           |  [address]      |             |
|        |         |           | [place]  |             |
|        |         |           | [place] [address]             |             |
|        |         |           | [phone]         |             |
|        |         |           | [phone] (Fax)   |             |
+--------+---------+-----------+----------------------+-------------+
 documented as of this encounter
 
 Procedures
 
 
+--------------------+--------+-------------+----------------------+----------------------+
| Procedure Name     | Priori | Date/Time   | Associated Diagnosis | Comments             |
|                    | ty     |             |                      |                      |
+--------------------+--------+-------------+----------------------+----------------------+
| XR KNEE RIGHT 3 VW | Routin | 2018  |   Right knee pain,   |   Results for this   |
|                    | e      | 10:09 AM    | unspecified          | procedure are in the |
|                    |        | PDT         | chronicity           |  results section.    |
+--------------------+--------+-------------+----------------------+----------------------+
 documented in this encounter
 
 Results
 XR Knee Right 3 Vw (2018 10:09 AM PDT)
 
+----------+
| Specimen |
+----------+
|          |
+----------+
 
 
 
+------------------------------------------------------------------------+---------------+
| Narrative                                                              | Performed At  |
+------------------------------------------------------------------------+---------------+
|   XR KNEE RIGHT 3 VW 2018 9:54 AM     HISTORY: EPNP --- Right Knee |   [facility] |
|  Pain --- Onset 2016.     COMPARISON: None.     FINDINGS:  Intact   |               |
| appearance of the total left knee arthroplasty. Mild to moderate       |               |
| medial  compartment joint space narrowing of the right knee with only  |               |
| minimal  tricompartmental right knee osteophytosis. Lucent lesion is   |               |
| seen along the  medial tibial plateau epiphysis measuring 12 mm.       |               |
|   Small 9 mm sclerotic region  seen along the medial femoral condyle.  |               |
|     IMPRESSION -   Lucent lesion is seen along the medial tibial       |               |
| plateau epiphysis measuring 12,  this is new since 2015. Given the     |               |
| patient's age, differential includes  metastasis, geode,   infection,  |               |
| or multiple myeloma.     Recommend contrast enhanced MRI for further   |               |
| characterization.           Dictated and Signed by: Nash Ocampo MD |               |
|     Electronically signed: 2018 11:01 AM                           |               |
+------------------------------------------------------------------------+---------------+
 
 
 
 
+--------------------------------------------------------------------------------------+
| Procedure Note                                                                       |
+--------------------------------------------------------------------------------------+
|   Darrell, Rad Results In - 2018 11:04 AM PDT  XR KNEE RIGHT 3 VW 2018 9:54 AM |
|                                                                                      |
| HISTORY: EPNP --- Right Knee Pain --- Onset 2016.                                 |
|                                                                                      |
| COMPARISON: None.                                                                    |
|                                                                                      |
| FINDINGS:                                                                            |
| Intact appearance of the total left knee arthroplasty. Mild to moderate medial       |
| compartment joint space narrowing of the right knee with only minimal                |
| tricompartmental right knee osteophytosis. Lucent lesion is seen along the           |
| medial tibial plateau epiphysis measuring 12 mm.  Small 9 mm sclerotic region        |
| seen along the medial femoral condyle.                                               |
|                                                                                      |
| IMPRESSION -                                                                         |
|  Lucent lesion is seen along the medial tibial plateau epiphysis measuring 12,       |
| this is new since . Given the patient's age, differential includes               |
| metastasis, geode,  infection, or multiple myeloma.                                  |
|                                                                                      |
| Recommend contrast enhanced MRI for further characterization.                        |
|                                                                                      |
| Dictated and Signed by: Nash Ocampo MD                                            |
|  Electronically signed: 2018 11:01 AM                                            |
+--------------------------------------------------------------------------------------+
 
 
 
+---------------+---------+--------------------+--------------+
| Performing    | Address | City/State/Zipcode | Phone Number |
| Organization  |         |                    |              |
+---------------+---------+--------------------+--------------+
|   [facility] |         |                    |              |
+---------------+---------+--------------------+--------------+
 documented in this encounter
 
 Visit Diagnoses
 
 
+-------------------------------------------+
| Diagnosis                                 |
+-------------------------------------------+
|   Right knee pain, unspecified chronicity |
+-------------------------------------------+
 documented in this encounter

## 2020-09-21 NOTE — XMS
Clinical Summary
  Created on: 2020
 
 Sayra Susanabijal Anderson
 External Reference #: [id number]
 : 48
 Sex: Female
 
 Demographics
 
 
+-----------------------+---------------------------+
| Address               | [address]            |
|                       | [place]  [address] |
+-----------------------+---------------------------+
| Home Phone            | [phone]           |
+-----------------------+---------------------------+
| Preferred Language    | Unknown                   |
+-----------------------+---------------------------+
| Marital Status        |                    |
+-----------------------+---------------------------+
| Temple Affiliation | 1073                      |
+-----------------------+---------------------------+
| Race                  | White                     |
+-----------------------+---------------------------+
| Ethnic Group          | Not  or     |
+-----------------------+---------------------------+
 
 
 Author
 
 
+--------------+--------------------------------------------+
| Author       | [facility]  |
|              | and [place]                                |
+--------------+--------------------------------------------+
| Organization | [facility]  |
|              | and [place]                                |
+--------------+--------------------------------------------+
| Address      | Unknown                                    |
+--------------+--------------------------------------------+
| Phone        | Unavailable                                |
+--------------+--------------------------------------------+
 
 
 
 Support
 
 
+-------------+--------------+-------------------+-----------------+
| Name        | Relationship | Address           | Phone           |
+-------------+--------------+-------------------+-----------------+
| Eduar Colbert | ECON         | [address]       | [phone] |
|             |              | [place]   |                 |
|             |              | [address]             |                 |
+-------------+--------------+-------------------+-----------------+
 
 
 
 
 Care Team Providers
 
 
+------------------------+------+-----------------+
| Care Team Member Name  | Role | Phone           |
+------------------------+------+-----------------+
| Álvaro Cisse MD | PCP  | [phone] |
+------------------------+------+-----------------+
 
 
 
 Allergies
 
 
+----------------+----------------------+----------+----------+----------------------+
| Active Allergy | Reactions            | Severity | Noted    | Comments             |
|                |                      |          | Date     |                      |
+----------------+----------------------+----------+----------+----------------------+
| Metoclopramide | Other (See Comments) | Medium   | 20 |   depression         |
|                |                      |          | 14       |                      |
+----------------+----------------------+----------+----------+----------------------+
| Moexipril      | Shortness Of Breath  | High     |          |   AKA Univasc        |
| Hydrochloride  |                      |          |          |                      |
+----------------+----------------------+----------+----------+----------------------+
| Oxycodone      | Other (See Comments) |          | 20 |   "makes her archiezy"  |
|                |                      |          | 17       |                      |
+----------------+----------------------+----------+----------+----------------------+
| Propafenone    | Other (See Comments) |          | 20 |   Did not work for   |
|                |                      |          | 18       | her Afib, she wants  |
|                |                      |          |          | this on her allergy  |
|                |                      |          |          | list                 |
+----------------+----------------------+----------+----------+----------------------+
 
 
 
 Medications
 
 
+----------------------+----------------------+-----------+---------+------+------+-------+
| Medication           | Sig                  | Dispensed | Refills | Star | End  | Statu |
|                      |                      |           |         | t    | Date | s     |
|                      |                      |           |         | Date |      |       |
+----------------------+----------------------+-----------+---------+------+------+-------+
|   cholecalciferol    | Take 400 Units by    |           | 0       | 09/1 |      | Activ |
| (VITAMIN D-3) 400    | mouth Daily.         |           |         | 2/20 |      | e     |
| units TABS           |                      |           |         | 12   |      |       |
+----------------------+----------------------+-----------+---------+------+------+-------+
|   Polyethylene       | Apply  to eye Daily  |           | 0       |      |      | Activ |
| Glycol 400 (BLINK    | as needed.           |           |         |      |      | e     |
| TEARS OP)            |                      |           |         |      |      |       |
+----------------------+----------------------+-----------+---------+------+------+-------+
|                      | Take 1,500 mg by     |           | 0       |      |      | Activ |
| Calcium-Magnesium-Vi | mouth.               |           |         |      |      | e     |
| tamin D (CITRACAL    |                      |           |         |      |      |       |
| SLOW RELEASE)        |                      |           |         |      |      |       |
| 600-           |                      |           |         |      |      |       |
| MG-MG-UNIT TB24      |                      |           |         |      |      |       |
+----------------------+----------------------+-----------+---------+------+------+-------+
|   ipratropium        | INSTILL TWO SPRAYS   |   15 mL   | 5       | 02/2 |      | Activ |
 
| (ATROVENT) 0.06%     | IN EACH NOSTRIL UP   |           |         | 2/20 |      | e     |
| nasal spray          | TO FOUR TIMES DAILY  |           |         | 17   |      |       |
|                      | AS NEEDED            |           |         |      |      |       |
+----------------------+----------------------+-----------+---------+------+------+-------+
|   citalopram         | Take 35 mg by mouth  |           | 0       | 04/0 |      | Activ |
| (CELEXA) 20 mg       | Daily .              |           |         | 7/20 |      | e     |
| tablet               |                      |           |         | 20   |      |       |
+----------------------+----------------------+-----------+---------+------+------+-------+
|   donepezil          | Take 1 tablet by     |   30      | 6       | 07 |      | Activ |
| (ARICEPT) 5 mg       | mouth nightly.       | tablet    |         |  |      | e     |
| tablet               |                      |           |         | 20   |      |       |
+----------------------+----------------------+-----------+---------+------+------+-------+
 
 
 
 Active Problems
 
 
+-----------------------------------------------------------------+------------+
| Problem                                                         | Noted Date |
+-----------------------------------------------------------------+------------+
| Chronic anticoagulation                                         | 2017 |
+-----------------------------------------------------------------+------------+
| Change in bowel habit                                           | 2017 |
+-----------------------------------------------------------------+------------+
| Aseptic loosening of prosthetic joint                           | 2016 |
+-----------------------------------------------------------------+------------+
| History of colon polyps                                         | 2015 |
+-----------------------------------------------------------------+------------+
| Osteoarthritis of knee                                          | 2015 |
+-----------------------------------------------------------------+------------+
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
+-----------------------------------------------------------------+------------+
| Anomalous atrioventricular excitation                           | 2014 |
+-----------------------------------------------------------------+------------+
| Depression with anxiety                                         | 2014 |
+-----------------------------------------------------------------+------------+
| Fibrillation, atrial                                            | 2014 |
+-----------------------------------------------------------------+------------+
| HTN (hypertension)                                              | 2014 |
+-----------------------------------------------------------------+------------+
| Hyperlipidemia                                                  | 2014 |
+-----------------------------------------------------------------+------------+
| Internal derangement of knee                                    | 2014 |
+-----------------------------------------------------------------+------------+
| Osteopenia                                                      | 2014 |
+-----------------------------------------------------------------+------------+
| Postmenopausal disorder                                         | 2014 |
+-----------------------------------------------------------------+------------+
| Unspecified essential hypertension                              | 2014 |
+-----------------------------------------------------------------+------------+
 
 
 
+------------------------------------+
|   Overview:   ICD-10 Record update |
+------------------------------------+
 
 
 
 
+---------------------------------------------+------------+
| Disorder of bone and cartilage, unspecified | 2014 |
+---------------------------------------------+------------+
 
 
 
+------------------------------------+
|   Overview:   ICD-10 Record update |
+------------------------------------+
 
 
 
+------------------------------------------+------------+
| Unspecified internal derangement of knee | 2014 |
+------------------------------------------+------------+
 
 
 
+------------------------------------+
|   Overview:   ICD-10 Record update |
+------------------------------------+
 
 
 
+--------------------------------------+------------+
| Other and unspecified hyperlipidemia | 2014 |
+--------------------------------------+------------+
| Atrial fibrillation                  | 2014 |
+--------------------------------------+------------+
| Dysthymic disorder                   | 2014 |
+--------------------------------------+------------+
| Esophageal reflux                    | 2014 |
+--------------------------------------+------------+
| Dyspnea on exertion                  | 2014 |
+--------------------------------------+------------+
| Abnormal stress ECG with treadmill   | 2014 |
+--------------------------------------+------------+
| Fatigue                              | 2014 |
+--------------------------------------+------------+
| PAROXYSMAL ATRIAL FIBRILLATION       |            |
+--------------------------------------+------------+
 
 
 
 Encounters
 
 
+--------+-----------+-----------+----------------+----------------------+
| Date   | Type      | Specialty | Care Team      | Description          |
+--------+-----------+-----------+----------------+----------------------+
| / | Office    | Neurology |   Jamin Brooks MD | Late onset           |
| 2020   | Visit     |           |                | Alzheimer's disease  |
|        |           |           |                | without behavioral   |
|        |           |           |                | disturbance (HCC)    |
|        |           |           |                | (Primary Dx)         |
+--------+-----------+-----------+----------------+----------------------+
| / | Clinical  | Neurology |   Jamin Brooks MD | Memory loss (Primary |
|    | Support   |           |                |  Dx)                 |
+--------+-----------+-----------+----------------+----------------------+
 from Last 3 Months
 
 
 Immunizations
 
 
+----------------------+------------------------+----------+
| Name                 | Administration Dates   | Next Due |
+----------------------+------------------------+----------+
| INFLUENZA PF 64 Y OR | 10/05/2017             |          |
|  >,TRIVALENT (FLUAD) |                        |          |
+----------------------+------------------------+----------+
| INFLUENZA PF         | 2019, 10/11/2018 |          |
| TRIVALENT(PED/ADOL/A |                        |          |
| DULT), PSKT          |                        |          |
+----------------------+------------------------+----------+
| INFLUENZA, O4S5-21,  | 2009             |          |
| UNSPECIFIED          |                        |          |
+----------------------+------------------------+----------+
| PNEUMOCOCCAL         | 2017             |          |
| POLYSACCHARIDE       |                        |          |
| 23-VALENT (PPSV23)   |                        |          |
+----------------------+------------------------+----------+
| ZOSTER NON-LIVE      | 2020             |          |
| (SHINGRIX)           |                        |          |
+----------------------+------------------------+----------+
| ZOSTER, 1 DOSE       | 2013             |          |
| (ZOSTAVAX)           |                        |          |
+----------------------+------------------------+----------+
 
 
 
 Family History
 
 
+---------------------+----------+------+-----------------+
| Medical History     | Relation | Name | Comments        |
+---------------------+----------+------+-----------------+
| Gout                | Father   |      |                 |
+---------------------+----------+------+-----------------+
| Heart attack        | Father   |      |                 |
+---------------------+----------+------+-----------------+
| Hypertension        | Father   |      |                 |
+---------------------+----------+------+-----------------+
| Kidney disease      | Father   |      |                 |
+---------------------+----------+------+-----------------+
| Muscle disease      | Father   |      |                 |
+---------------------+----------+------+-----------------+
| Osteoarthritis      | Father   |      |                 |
+---------------------+----------+------+-----------------+
| Cancer              | Mother   |      |                 |
+---------------------+----------+------+-----------------+
| Dementia            | Mother   |      |                 |
+---------------------+----------+------+-----------------+
| Heart attack        | Mother   |      |                 |
+---------------------+----------+------+-----------------+
| Hypertension        | Mother   |      |                 |
+---------------------+----------+------+-----------------+
| Kidney disease      | Mother   |      |                 |
+---------------------+----------+------+-----------------+
| Muscle disease      | Mother   |      |                 |
+---------------------+----------+------+-----------------+
 
| Osteoporosis        | Mother   |      |                 |
+---------------------+----------+------+-----------------+
| Other (see comment) | Mother   |      | PAF             |
+---------------------+----------+------+-----------------+
| Heart attack        | Other    |      | Sibling history |
+---------------------+----------+------+-----------------+
| Hypertension        | Other    |      | Sibling history |
+---------------------+----------+------+-----------------+
| Kidney disease      | Other    |      | Sibling history |
+---------------------+----------+------+-----------------+
| Muscle disease      | Other    |      | Sibling history |
+---------------------+----------+------+-----------------+
 
 
 
+----------+------+----------+----------+
| Relation | Name | Status   | Comments |
+----------+------+----------+----------+
| Father   |      |  |          |
|          |      |   (Age   |          |
|          |      | 60)      |          |
+----------+------+----------+----------+
| Mother   |      |  |          |
|          |      |   (Age   |          |
|          |      | 80)      |          |
+----------+------+----------+----------+
| Other    |      |          |          |
+----------+------+----------+----------+
 
 
 
 Social History
 
 
+---------------+------------+-----------+--------+------------------+
| Tobacco Use   | Types      | Packs/Day | Years  | Date             |
|               |            |           | Used   |                  |
+---------------+------------+-----------+--------+------------------+
| Former Smoker | Cigarettes | 1.5       | 5      | Quit: 1979 |
+---------------+------------+-----------+--------+------------------+
 
 
 
+---------------------+---+---+---+
| Smokeless Tobacco:  |   |   |   |
| Never Used          |   |   |   |
+---------------------+---+---+---+
 
 
 
+-------------+----------------------+---------+------------------+
| Alcohol Use | Drinks/Week          | oz/Week | Comments         |
+-------------+----------------------+---------+------------------+
| Yes         |   1 Shots of liquor  | 1.0     | "social drinker" |
|             |  0 Standard drinks   |         |                  |
|             | or equivalent        |         |                  |
+-------------+----------------------+---------+------------------+
 
 
 
 
+------------------+---------------+
| Sex Assigned at  | Date Recorded |
| Birth            |               |
+------------------+---------------+
| Not on file      |               |
+------------------+---------------+
 
 
 
 Last Filed Vital Signs
 
 
+-------------------+---------------------+----------------------+----------+
| Vital Sign        | Reading             | Time Taken           | Comments |
+-------------------+---------------------+----------------------+----------+
| Blood Pressure    | 145/77              | 2020 10:05 AM  |          |
|                   |                     | PDT                  |          |
+-------------------+---------------------+----------------------+----------+
| Pulse             | 50                  | 2020 10:05 AM  |          |
|                   |                     | PDT                  |          |
+-------------------+---------------------+----------------------+----------+
| Temperature       | 36.5   C (97.7   F) | 2017 11:16 AM  |          |
|                   |                     | PDT                  |          |
+-------------------+---------------------+----------------------+----------+
| Respiratory Rate  | 58                  | 2018  2:21 PM  |          |
|                   |                     | PST                  |          |
+-------------------+---------------------+----------------------+----------+
| Oxygen Saturation | 100%                | 2020 10:05 AM  |          |
|                   |                     | PDT                  |          |
+-------------------+---------------------+----------------------+----------+
| Inhaled Oxygen    | -                   | -                    |          |
| Concentration     |                     |                      |          |
+-------------------+---------------------+----------------------+----------+
| Weight            | 62.1 kg (137 lb)    | 2020 10:05 AM  |          |
|                   |                     | PDT                  |          |
+-------------------+---------------------+----------------------+----------+
| Height            | 167.6 cm (5' 6")    | 2020 10:05 AM  |          |
|                   |                     | PDT                  |          |
+-------------------+---------------------+----------------------+----------+
| Body Mass Index   | 22.11               | 2020 10:05 AM  |          |
|                   |                     | PDT                  |          |
+-------------------+---------------------+----------------------+----------+
 
 
 
 Plan of Treatment
 
 
+--------+---------+-----------+----------------------+-------------+
| Date   | Type    | Specialty | Care Team            | Description |
+--------+---------+-----------+----------------------+-------------+
| 10/12/ | Office  | Neurology |   Jamin Brooks MD  [address] |             |
|    | Visit   |           |  [address]      |             |
|        |         |           | [place]  |             |
|        |         |           | [place] [address]             |             |
|        |         |           | [phone]         |             |
|        |         |           | [phone] (Fax)   |             |
+--------+---------+-----------+----------------------+-------------+
 
 
 
 
+---------------------+-----------+----------+----------+
| Health Maintenance  | Due Date  | Last     | Comments |
|                     |           | Done     |          |
+---------------------+-----------+----------+----------+
| Hepatitis C         |  |          |          |
| Screening           | 9         |          |          |
+---------------------+-----------+----------+----------+
| Vaccine:            |  |          |          |
| Dtap/Tdap/Td (1 -   | 8         |          |          |
| Tdap)               |           |          |          |
+---------------------+-----------+----------+----------+
| Breast Cancer       |  |          |          |
| Screening           | 4         |          |          |
+---------------------+-----------+----------+----------+
| Adult Annual        |  |          |          |
| Wellness Visit      | 5         |          |          |
+---------------------+-----------+----------+----------+
| Vaccine: Zoster (3  |  | 20 |          |
| of 3)               | 0         | 20,      |          |
|                     |           | 20 |          |
|                     |           | 13       |          |
+---------------------+-----------+----------+----------+
| Vaccine: Influenza  |  | 20 |          |
| (#1)                | 0         | 19,      |          |
|                     |           | 10/11/20 |          |
|                     |           | 18,      |          |
|                     |           | 10/05/20 |          |
|                     |           | 17,      |          |
|                     |           | Addition |          |
|                     |           | al       |          |
|                     |           | history  |          |
|                     |           | exists   |          |
+---------------------+-----------+----------+----------+
| Colorectal Cancer   |  | 20 |          |
| Screening           | 7         | 17,      |          |
| (Colonoscopy)       |           | 20 |          |
|                     |           | 17,      |          |
|                     |           | 20 |          |
|                     |           | 11       |          |
+---------------------+-----------+----------+----------+
| Vaccine:            | Completed | 20 |          |
| Pneumococcal 65+    |           | 17       |          |
+---------------------+-----------+----------+----------+
 
 
 
 Results
 Not on filefrom Last 3 Months
 
 Insurance
 
 
+----------------+--------+-------------+--------+-------------+---------+--------+
| Payer          | Benefi | Subscriber  | Effect | Phone       | Address | Type   |
|                | t Plan | ID          | tawana    |             |         |        |
|                |  /     |             | Dates  |             |         |        |
|                | Group  |             |        |             |         |        |
+----------------+--------+-------------+--------+-------------+---------+--------+
| MEDICARE       | MEDICA | [id number] | 20 | [phone] |         | [employer] |
 
|                | RE     |             | 14-Pre | 5           |         | re     |
|                | PART A |             | sent   |             |         |        |
|                |  AND B |             |        |             |         |        |
+----------------+--------+-------------+--------+-------------+---------+--------+
| MEDICARE       | MEDICA | [id number] | 20 | [phone] |         | Medica |
|                | RE     |             | 14-Pre | 5           |         | re     |
|                | PART A |             | sent   |             |         |        |
|                |  AND B |             |        |             |         |        |
+----------------+--------+-------------+--------+-------------+---------+--------+
| [employer] | [employer] | [id number]  | 20 |             |         | Indemn |
|                | TREVI[facility]    |             | 17-Pre |             |         | ity    |
|                | LIFE   |             | sent   |             |         |        |
|                | MDCR   |             |        |             |         |        |
|                | SUPPL  |             |        |             |         |        |
+----------------+--------+-------------+--------+-------------+---------+--------+
| [employer] | [employer] | [id number]  | 20 |             |         | Indemn |
|                | TREVI[facility]    |             | 17-Pre |             |         | ity    |
|                | LIFE   |             | sent   |             |         |        |
|                | MDCR   |             |        |             |         |        |
|                | SUPPL  |             |        |             |         |        |
+----------------+--------+-------------+--------+-------------+---------+--------+
 
 
 
+----------------+--------+-------------+--------+-------------+--------------------+
| Guarantor Name | Accoun | Relation to | Date   | Phone       | Billing Address    |
|                | t Type |  Patient    | of     |             |                    |
|                |        |             | Birth  |             |                    |
+----------------+--------+-------------+--------+-------------+--------------------+
| Susana Colbert   | Person | Self        | / |             |   [address]   |
| [place]      | al/Fam |             | 1950   | [phone] | [place], [place]      |
|                | [place]    |             |        | 5 (Home)    | [phone]         |
+----------------+--------+-------------+--------+-------------+--------------------+
| Susana Colbert   | Person | Self        | / |             |   [address]   |
| [place]      | al/Fam |             | 1950   | [phone] | [place], [place]      |
|                | [place]    |             |        | 5 (Home)    | [phone]         |
+----------------+--------+-------------+--------+-------------+--------------------+
| Susana Colbert   | Person | Self        | / |             |   [address]   |
| [place]      | al/Fam |             | 1950   | [phone] | [place], [place]      |
|                | [place]    |             |        | 5 (Home)    | [phone]         |
+----------------+--------+-------------+--------+-------------+--------------------+
 
 
 
 Advance Directives
 
 
+-----------+-----------------+----------------+-------------+
| Type      | Date Recorded   | Patient        | Explanation |
|           |                 | Representative |             |
+-----------+-----------------+----------------+-------------+
| Power of  |                 |                |             |
|   |                 |                |             |
+-----------+-----------------+----------------+-------------+
| Power of  |                 |                |             |
|   |                 |                |             |
+-----------+-----------------+----------------+-------------+
| Advance   | 2017  9:38 |                |             |
| Directive |  AM             |                |             |
+-----------+-----------------+----------------+-------------+
 
| Advance   |                 |                |             |
| Directive |                 |                |             |
+-----------+-----------------+----------------+-------------+

## 2020-09-21 NOTE — XMS
Encounter Summary
  Created on: 2020
 
 SayraSusana
 External Reference #: [id number]
 : 48
 Sex: Female
 
 Demographics
 
 
+-----------------------+---------------------------+
| Address               | [address]            |
|                       | [place]  [address] |
+-----------------------+---------------------------+
| Home Phone            | [phone]           |
+-----------------------+---------------------------+
| Preferred Language    | Unknown                   |
+-----------------------+---------------------------+
| Marital Status        |                    |
+-----------------------+---------------------------+
| Holiness Affiliation | 1073                      |
+-----------------------+---------------------------+
| Race                  | White                     |
+-----------------------+---------------------------+
| Ethnic Group          | Not  or     |
+-----------------------+---------------------------+
 
 
 Author
 
 
+--------------+--------------------------------------------+
| Author       | [facility]  |
|              | [facility] [place]                                |
+--------------+--------------------------------------------+
| Organization | [facility]  |
|              | [facility] [place]                                |
+--------------+--------------------------------------------+
| Address      | Unknown                                    |
+--------------+--------------------------------------------+
| Phone        | Unavailable                                |
+--------------+--------------------------------------------+
 
 
 
 Support
 
 
+-------------+--------------+-------------------+-----------------+
| Name        | Relationship | Address           | Phone           |
+-------------+--------------+-------------------+-----------------+
| Eduar Colbert | ECON         | [address]       | [phone] |
|             |              | [place]   |                 |
|             |              | [address]             |                 |
+-------------+--------------+-------------------+-----------------+
 
 
 
 
 Care Team Providers
 
 
+------------------------+------+-----------------+
| Care Team Member Name  | Role | Phone           |
+------------------------+------+-----------------+
| Álvaro Cisse MD | PCP  | [phone] |
+------------------------+------+-----------------+
 
 
 
 Reason for Visit
 
 
+--------------------+----------+
| Reason             | Comments |
+--------------------+----------+
| Neurologic Problem |          |
+--------------------+----------+
 Evaluate & Treat (Routine)
 
+------------+--------+-----------+--------------+--------------+---------------+
| Status     | Reason | Specialty | Diagnoses /  | Referred By  | Referred To   |
|            |        |           | Procedures   | Contact      | Contact       |
+------------+--------+-----------+--------------+--------------+---------------+
| Authorized |        | Neurology |   Diagnoses  |   Tanna,  |   Jamin Brooks,   |
|            |        |           |  Other       | Álvaro MCGOWAN,   | MD  [address]      |
|            |        |           | amnesia      | MD  [address]  | [place]      |
|            |        |           | Other        | [address]  | [address]  [address]  |
|            |        |           | specified    |  [place]  | [place], |
|            |        |           | anxiety      | [place] [address]     |  [place] [address]     |
|            |        |           | disorders    | Phone:       | Phone:        |
|            |        |           |              | [phone] | [phone]  |
|            |        |           |              |   Fax:       |  Fax:         |
|            |        |           |              | [phone] | [phone]  |
+------------+--------+-----------+--------------+--------------+---------------+
 
 
 
 
 Encounter Details
 
 
+--------+----------+---------------------+----------------------+----------------------+
| Date   | Type     | Department          | Care Team            | Description          |
+--------+----------+---------------------+----------------------+----------------------+
| / | Virtual  |   [facility]     |   Jamin Brooks MD  [address] | Memory loss (Primary |
|    | Office   | NEUROLOGY  [address]      |  Dx)                 |
|        | Visit    | [address]   | [address]  [facility]  |                      |
|        |          | [place], [place]        | [place] [address]             |                      |
|        |          | [address]          | [phone]         |                      |
|        |          | [phone]        | [phone] (Fax)   |                      |
+--------+----------+---------------------+----------------------+----------------------+
 
 
 
 Social History
 
 
 
+---------------+------------+-----------+--------+------------------+
| Tobacco Use   | Types      | Packs/Day | Years  | Date             |
|               |            |           | Used   |                  |
+---------------+------------+-----------+--------+------------------+
| Former Smoker | Cigarettes | 1.5       | 5      | Quit: 1979 |
+---------------+------------+-----------+--------+------------------+
 
 
 
+---------------------+---+---+---+
| Smokeless Tobacco:  |   |   |   |
| Never Used          |   |   |   |
+---------------------+---+---+---+
 
 
 
+-------------+----------------------+---------+------------------+
| Alcohol Use | Drinks/Week          | oz/Week | Comments         |
+-------------+----------------------+---------+------------------+
| Yes         |   1 Shots of liquor  | 1.0     | "social drinker" |
|             |  0 Standard drinks   |         |                  |
|             | or equivalent        |         |                  |
+-------------+----------------------+---------+------------------+
 
 
 
+------------------+---------------+
| Sex Assigned at  | Date Recorded |
| Birth            |               |
+------------------+---------------+
| Not on file      |               |
+------------------+---------------+
 documented as of this encounter
 
 Progress Notes
 Jamin Brooks MD - 2020  9:30 AM PDTFormatting of this note might be different from the o
riginal.
 Referring Physician: Álvaro Cisse MD 
 PCP: Álvaro Cisse MD 
 Date of Encounter: 2020 
 
 Service was provided face-to-face with the patient via interactive videoconferencing
  Video start time 9:50
  Video end time 10:25
 Total time (in minutes) including non face-to-face time (reviewing records, etc..) 45
 
 You have chosen to receive care through the use of telemedicine. Telemedicine enables [facility] care providers at different locations to provide safe, effective and convenient care throu
gh the use of technology. As with any health care service, there are risks associated with t
he use of telemedicine, including equipment failure, poor image resolution and information s
ecurity issues.
  
 Do you understand the risks and benefits of telemedicine as I have explained them to you? "
Yes"
  
 Have your questions regarding telemedicine been answered? "Yes"
 
 Patient is currently at Patient's location: home, [place], [place]
  
 Do you consent to the use of telemedicine in your medical care today?   Yes. 
 
 
 Last question, I need to confirm where are you physically located right now?
 Answer: Patient confirms they are located in a state where Jamin CONROY MD  is licensed.    
 
 CHIEF COMPLAINT: memory loss
 
 HISTORY OF PRESENT ILLNESS
 Susana Colbert is a pleasant 69 y.o. female with history of depression, A. fib stat
us post ablation, who presents to the clinic today for evaluation of memory loss.
 
 Patient reports memory loss for 1 year or longer.  Short term memory is primarily affected.
  Patient would forget things that was just told to her.  She would repeatedly ask the same 
question.  She forgets appointments.  She misplaces personal items.  At one time, she starte
d a meal, got distracted, and then when she returned, she started to cook something complete
ly different.  She denies difficulty driving, recent traffic tickets or accidents.  She take
s medication by herself, but occasionally needs reminder from .  She recently was wor
vivian with primary doctor to taper her Celexa, she had trouble knowing how to follow the doct
ors instructions.   is concerned. 
 She has no significant difficulty handling daily activity at this time. 
 She reports good balance, she denies involuntary movement, denies bladder incontinence. 
 Her mother was diagnosed with Alzheimer disease in her early 60s, maternal grandmother had 
dementia in her 80s. 
 Patient has 30-year history of depression, was on several different antidepressant.  She fe
els her depression is under control at this time.  She is in the process of weaning Celexa.
  
 Labs CBC, CMP, RPR unremarkable. 
 CT head 2019, [facility]: Minimal atherosclerotic disease.  Otherwise normal appearanc
e of the brain.  
 
 Highest education: Associate degree
 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
 
 ALLERGIES
 Allergies 
 Allergen Reactions 
   Moexipril Hydrochloride Shortness Of Breath 
   AKA Univasc 
   Metoclopramide Other (See Comments) 
   depression 
   Oxycodone Other (See Comments) 
   "makes her crazy" 
   Propafenone Other (See Comments) 
   Did not work for her Afib, she wants this on her allergy list 
  
 MEDICATIONS
 
 Current Outpatient Medications: 
    alendronate (FOSAMAX) 70 mg tablet, , Disp: , Rfl: 12
    Calcium-Magnesium-Vitamin D (CITRACAL SLOW RELEASE) 600- MG-MG-UNIT TB24, Take 1
,500 mg by mouth., Disp: , Rfl: 
    cholecalciferol (VITAMIN D-3) 400 units TABS, Take 400 Units by mouth Daily., Disp: , 
Rfl: 
    estradiol (VAGIFEM) 10 mcg vaginal tablet, Place 10 mcg vaginally as needed., Disp: , 
Rfl: 
    ipratropium (ATROVENT) 0.06% nasal spray, INSTILL TWO SPRAYS IN EACH NOSTRIL UP TO FOU
R TIMES DAILY AS NEEDED, Disp: 15 mL, Rfl: 5
    Lysine 500 MG CAPS, Take 500 mg by mouth Daily., Disp: , Rfl: 
    metoprolol succinate (TOPROL-XL) 25 mg 24 hr tablet, one tablet by mouth daily, Disp: 
, Rfl: 1
    mupirocin (BACTROBAN) 2% ointment, Apply topically to the nose as directed., Disp: 22 
 
g, Rfl: 1
    Polyethylene Glycol 400 (BLINK TEARS OP), Apply  to eye Daily as needed., Disp: , Rfl:
 
    rivaroxaban (XARELTO) 20 mg tablet, Take 20 mg by mouth Daily., Disp: , Rfl: 
    sertraline (ZOLOFT) 50 mg tablet, 1 and 1/2 tablet by mouth daily (Patient taking diff
erently: Take 75 mg by mouth Daily.), Disp: , Rfl:  
 
 PAST MEDICAL HISTORY
 Past Medical History: 
 Diagnosis Date 
   Anomalous atrioventricular excitation 2014 
   Arthritis  
   Atrial fibrillation (HCC) 2014 
   Atrial flutter (HCC)  
   Depression with anxiety 2014 
   Disorder of bone and cartilage, unspecified 2014 
   Esophageal reflux 2014 
   Essential hypertension 2014 
   H/O campylobacteriosis 2017 
   Hyperlipidemia 2014 
   Internal derangement of knee 2014 
   Osteoarthritis  
   Osteopenia 2014 
   Postmenopausal disorder 2014 
   Unspecified essential hypertension 2014 
   Unspecified internal derangement of knee 2014 
  
 
 FAMILY HISTORY
 Family History 
 Problem Relation Age of Onset 
   Heart attack Father  
   Osteoarthritis Father  
   Hypertension Father  
   Muscle disease Father  
   Kidney disease Father  
   Gout Father  
   Osteoporosis Mother  
   Dementia Mother  
   Other (see comment) Mother  
      PAF 
   Hypertension Mother  
   Muscle disease Mother  
   Kidney disease Mother  
   Heart attack Mother  
   Cancer Mother  
   Hypertension Other  
      Sibling history 
   Heart attack Other  
      Sibling history 
   Muscle disease Other  
      Sibling history 
   Kidney disease Other  
      Sibling history 
  
 
 SOCIAL HISTORY
 Social History 
 
 Socioeconomic History 
 
   Marital status:  
   Spouse name: Not on file 
   Number of children: Not on file 
   Years of education: Not on file 
   Highest education level: Not on file 
 Occupational History 
   Occupation: Retired LPN 
 Social Needs 
   Financial resource strain: Not on file 
   Food insecurity: 
   Worry: Not on file 
   Inability: Not on file 
   Transportation needs: 
   Medical: Not on file 
   Non-medical: Not on file 
 Tobacco Use 
   Smoking status: Former Smoker 
   Packs/day: 1.50 
   Years: 5.00 
   Pack years: 7.50 
   Types: Cigarettes 
   Last attempt to quit: 1979 
   Years since quittin.3 
   Smokeless tobacco: Never Used 
 Substance and Sexual Activity 
   Alcohol use: Yes 
   Alcohol/week: 1.0 standard drinks 
   Types: 1 Shots of liquor per week 
   Comment: "social drinker" 
   Drug use: No 
   Sexual activity: Not on file 
 Lifestyle 
   Physical activity: 
   Days per week: Not on file 
   Minutes per session: Not on file 
   Stress: Not on file 
 Relationships 
   Social connections: 
   Talks on phone: Not on file 
   Gets together: Not on file 
   Attends [employer]ism service: Not on file 
   Active member of club or organization: Not on file 
   Attends meetings of clubs or organizations: Not on file 
   Relationship status: Not on file 
   Intimate partner violence: 
   Fear of current or ex partner: Not on file 
   Emotionally abused: Not on file 
   Physically abused: Not on file 
   Forced sexual activity: Not on file 
 Other Topics Concern 
   Not on file 
 Social History Narrative 
   Not on file 
  
 
 REVIEW OF SYSTEMS
 In addition to HPI, ROS also revealed:
 All other systems are reviewed and negative
 
 PHYSICAL EXAM
 
 General Exam
 There were no vitals filed for this visit. 
 WDWN, NAD
 Neuro Exam
 MS
 Awake, alert, oriented x3. Cooperative and appropriate during the encounter. Speech is myke
r, fluent and coherent. 
 Registered 3/3 and recalled 1/3 items after 3 minutes.  Spells world backwards
 CN
 EOMI.  
 Face is symmetric. 
 Hearing is intact. 
 Tongue protrusion is midline. 
 Motor
 Bulk and Tone: Unable to evaluate muscle tone
 Muscle strength: No pronator drift
 Coordination
 FNF intact
 Gait
 normal
 
 ASSESSMENT & PLAN
 
 69 y.o. female who presents with more than 1 year history of short-term memory loss.  It wa
s noted that her recent head CT was unremarkable.  Her neurological exam showed difficulty r
ecalling.  Her neurological exam is otherwise unremarkable.  Recent labs including CBC, CMP,
 RPR were unremarkable.
 Patient falls in the mild cognitive impairment range. Review of the history did show long-t
erm depression that may contribute.  However patient does reports well-controlled depression
 at this time.  Several points in her history, as well as difficulty recall can be concernin
g for early dementia. 
 - Recommend [facility] for more detailed memory test and to evaluate for patterns of cognitive de
ficit to suggest certain type of dementia
 - Recommend to check B12 and TSH if not done so already.  Patient will follow this with cornelio sandhu
 - Discussed cognitive health issues. Advised to stay mentally and physically active. Regula
r physical exercises was encouraged, as patient can tolerate. Follow with primary doctor to 
continue optimize management of depression.
 - Follow up after the WACE and as needed
 
 Thank you for allowing me to take care of this patient. Please do not hesitate to contact m
e if you have any questions.
 Cc:
 Álvaro Cisse MD 
 
 This note was prepared using Dragon dictation voice recognition technology. There may be so
und-alike errors even though every effort has been made to ensure accuracy. 
 
 Electronically signed by Jamin Brooks MD at 2020  2:52 PM PDTdocumented in this encounter
 
 Plan of Treatment
 
 
+--------+---------+-----------+----------------------+-------------+
| Date   | Type    | Specialty | Care Team            | Description |
+--------+---------+-----------+----------------------+-------------+
| 10/12/ | Office  | Neurology |   Jamin Brooks MD  [address] |             |
|    | Visit   |           |  [address]      |             |
|        |         |           | [place],  |             |
|        |         |           | [place] [address]             |             |
 
|        |         |           | [phone]         |             |
|        |         |           | [phone] (Fax)   |             |
+--------+---------+-----------+----------------------+-------------+
 documented as of this encounter
 
 Visit Diagnoses
 
 
+-------------------------+
| Diagnosis               |
+-------------------------+
|   Memory loss - Primary |
+-------------------------+
 documented in this encounter

## 2020-09-21 NOTE — XMS
Encounter Summary
  Created on: 2020
 
 SayraSusana
 External Reference #: [id number]
 : 48
 Sex: Female
 
 Demographics
 
 
+-----------------------+---------------------------+
| Address               | [address]            |
|                       | [place]  [address] |
+-----------------------+---------------------------+
| Home Phone            | [phone]           |
+-----------------------+---------------------------+
| Preferred Language    | Unknown                   |
+-----------------------+---------------------------+
| Marital Status        |                    |
+-----------------------+---------------------------+
| Church Affiliation | [id number]                      |
+-----------------------+---------------------------+
| Race                  | White                     |
+-----------------------+---------------------------+
| Ethnic Group          | Not  or     |
+-----------------------+---------------------------+
 
 
 Author
 
 
+--------------+--------------------------------------------+
| Author       | [facility]  |
|              | [facility] [place]                                |
+--------------+--------------------------------------------+
| Organization | [facility]  |
|              | [facility] [place]                                |
+--------------+--------------------------------------------+
| Address      | Unknown                                    |
+--------------+--------------------------------------------+
| Phone        | Unavailable                                |
+--------------+--------------------------------------------+
 
 
 
 Support
 
 
+-------------+--------------+-------------------+-----------------+
| Name        | Relationship | Address           | Phone           |
+-------------+--------------+-------------------+-----------------+
| Eduar Colbert | ECON         | [address]       | [phone] |
|             |              | [place]   |                 |
|             |              | [address]             |                 |
+-------------+--------------+-------------------+-----------------+
 
 
 
 
 Care Team Providers
 
 
+-------------------------+------+-----------------+
| Care Team Member Name   | Role | Phone           |
+-------------------------+------+-----------------+
| Tobin Alex MD | PCP  | [phone] |
+-------------------------+------+-----------------+
 
 
 
 Reason for Visit
 
 
+--------+--------+----------+
| Reason | Onset  | Comments |
|        | Date   |          |
+--------+--------+----------+
| Other  | / |          |
|        |    |          |
+--------+--------+----------+
 
 
 
 Encounter Details
 
 
+--------+-----------+----------------------+----------------------+-------------+
| Date   | Type      | Department           | Care Team            | Description |
+--------+-----------+----------------------+----------------------+-------------+
| / | Telephone |   [facility]          |   Mahin Leiva MD | Other       |
|    |           | GASTROENTEROLOGY     |   [address] [place]  |             |
|        |           | [address] [place]  | [address]  [place], [place] |             |
|        |           | [address]  [place], [place] |  [address]  [phone] |             |
|        |           |  [address]          |   [phone] (Fax) |             |
|        |           | [phone]         |                      |             |
+--------+-----------+----------------------+----------------------+-------------+
 
 
 
 Social History
 
 
+---------------+------------+-----------+--------+------------------+
| Tobacco Use   | Types      | Packs/Day | Years  | Date             |
|               |            |           | Used   |                  |
+---------------+------------+-----------+--------+------------------+
| Former Smoker | Cigarettes | 1.5       | 5      | Quit: 1979 |
+---------------+------------+-----------+--------+------------------+
 
 
 
+---------------------+---+---+---+
| Smokeless Tobacco:  |   |   |   |
| Never Used          |   |   |   |
+---------------------+---+---+---+
 
 
 
 
+-------------+----------------------+---------+------------------+
| Alcohol Use | Drinks/Week          | oz/Week | Comments         |
+-------------+----------------------+---------+------------------+
| Yes         |   1 Shots of liquor  | 1.0     | "social drinker" |
|             |  0 Standard drinks   |         |                  |
|             | or equivalent        |         |                  |
+-------------+----------------------+---------+------------------+
 
 
 
+------------------+---------------+
| Sex Assigned at  | Date Recorded |
| Birth            |               |
+------------------+---------------+
| Not on file      |               |
+------------------+---------------+
 documented as of this encounter
 
 Miscellaneous Notes
 Telephone Encounter - Nusrat Bustillos RN - 2017  3:02 PM PDTDiscussed with ayala
ent that Dr. Leiva felt it could be a motility disorder.  She denies any abdominal cramping 
just occasional watery diarrhea.  She will use Imodium when she has to go out.  She will com
e along with her  to his appointment with Dr. Leiva next month.  If diarrhea worsen s
he will call our office so stool studies can be ordered.Electronically signed by Nusrat Bustillos RN at 2017  3:10 PM PDTTelephone Encounter - Nusrat Bustillos RN -   9:17 AM PDTSpoke with patient and she reports the Azithromycin she took for campy he
lped the diarrhea, however she continues to have  urgency and looser stool about 2 times a w
[place].  On these days she will have to run to the bathroom and stool is loose.  She has been o
n a probiotic and has tried different ones such as duff.  She wonders if she should take
 another course of antibiotics.  Discussed with patient it is common to have a post-motility
 disorder after an infection.  She states previously she was more prone to constipation.Elec
tronically signed by Nusrat Bustillos RN at 2017  9:24 AM PDTTelephone Encounter -
 Nusrat Bustillos RN - 07/10/2017  4:26 PM PDTLeft message returning patient's call.Darlene
ctronically signed by Nusrat Bustillos RN at 07/10/2017  4:27 PM PDTTelephone Encounter 
- Nusrat Bustillos RN - 07/10/2017  9:27 AM PDTLeft message returning patient's call.El
ectronically signed by Nusrat Bustillos RN at 07/10/2017  9:27 AM PDTTelephone Encounter
 - Charu Rosas - 2017  4:07 PM PDTPatient called in asking for Rodriguez stating steven chester had a "bacteria in her gut" in April and was supposed to get back with her about she was sawyer florentino.  Please call her back at [phone]. Electronically signed by Charu Rosas at 0
2017  4:08 PM PDTdocumented in this encounter
 
 Plan of Treatment
 
 
+--------+---------+-----------+----------------------+-------------+
| Date   | Type    | Specialty | Care Team            | Description |
+--------+---------+-----------+----------------------+-------------+
| 10/12/ | Office  | Neurology |   Jamin Brooks MD  [address] |             |
| 2020   | Visit   |           |  [address]      |             |
|        |         |           | [place],  |             |
|        |         |           | [place] [address]             |             |
|        |         |           | [phone]         |             |
|        |         |           | [phone] (Fax)   |             |
+--------+---------+-----------+----------------------+-------------+
 documented as of this encounter
 
 Visit Diagnoses
 Not on filedocumented in this encounter

## 2023-01-10 ENCOUNTER — HOSPITAL ENCOUNTER (INPATIENT)
Dept: HOSPITAL 46 - ED | Age: 73
LOS: 3 days | Discharge: HOME | DRG: 310 | End: 2023-01-13
Attending: INTERNAL MEDICINE | Admitting: INTERNAL MEDICINE
Payer: MEDICARE

## 2023-01-10 VITALS — WEIGHT: 139.55 LBS | HEIGHT: 66 IN | BODY MASS INDEX: 22.43 KG/M2

## 2023-01-10 DIAGNOSIS — G30.9: ICD-10-CM

## 2023-01-10 DIAGNOSIS — Z98.890: ICD-10-CM

## 2023-01-10 DIAGNOSIS — Z79.899: ICD-10-CM

## 2023-01-10 DIAGNOSIS — I48.91: Primary | ICD-10-CM

## 2023-01-10 DIAGNOSIS — Z20.822: ICD-10-CM

## 2023-01-10 DIAGNOSIS — Z87.891: ICD-10-CM

## 2023-01-10 DIAGNOSIS — F32.A: ICD-10-CM

## 2023-01-10 DIAGNOSIS — Z96.652: ICD-10-CM

## 2023-01-10 DIAGNOSIS — F02.80: ICD-10-CM

## 2023-01-10 PROCEDURE — U0003 INFECTIOUS AGENT DETECTION BY NUCLEIC ACID (DNA OR RNA); SEVERE ACUTE RESPIRATORY SYNDROME CORONAVIRUS 2 (SARS-COV-2) (CORONAVIRUS DISEASE [COVID-19]), AMPLIFIED PROBE TECHNIQUE, MAKING USE OF HIGH THROUGHPUT TECHNOLOGIES AS DESCRIBED BY CMS-2020-01-R: HCPCS

## 2023-01-10 PROCEDURE — C9803 HOPD COVID-19 SPEC COLLECT: HCPCS

## 2023-01-10 PROCEDURE — A9270 NON-COVERED ITEM OR SERVICE: HCPCS

## 2023-01-10 NOTE — NUR
RN ROUNDING ON PT - RESTING ON SIDE WITH  IN CHAIR NEXT TO BED. HR 65,
REMAINS AFLUTTER. CALL LIGHT AT SIDE.

## 2023-01-10 NOTE — NUR
ADMISSION ASSEMENT COMPLETE. PT REMAINS IN AFLUTTER WITH A RATE CONTROLLED IN
THE 60'S EVEN WITH MOVEMENT IN BED. DILT DRIP OFF AT THIS TIME. OTHERWISE
ASSESMENT NEGATIVE FOR ABNORMAL FINDINGS. HUSABND REMAINS AT BEDSIDE, PLANS TO
STAY THE NIGHT, LINENS PROVIDED. HYGENIE ITEMS ALSO PROVIDED PER REQUEST -
DENIES OTHER NEEDS, CALL LIGHT AT REACH.

## 2023-01-10 NOTE — NUR
PATIENT RECEIVED FROM ER AT 1854 VIA STRETCHER WITH SPOUSE AT BEDSIDE. PATIENT
ALERT, ORIENTED TO SELF AND SITUATION, BUT STATES SHE HAS 'SHORT TERM MEMORY
LOSS'. PATIENT IS PLEASANT WITH NO COMPLAINTS OR NEEDS AT THIS TIME.
DILTIAZEM IV INFUSING AT 5ML/HR. IV SITE WNL, NO REDNESS OR HEMATOMA NOTED.
AFLUTTER ON THE MONITOR HR 60-70'S. PATIENT AND VISITOR GIVEN MEAL BOXES AND
WATER.

## 2023-01-10 NOTE — NUR
PT RESTING IN BED ALERT AND ORIENTED EATING WENDYS FROSTY. HR 65 IN A FLUTTER.
PT DENIES CHEST PAIN. /76. IVF INFUSING THROUGH R FOREARM SITE,
DILT DRIP REMAINS AT 5MG/HR THROUGH LEFT FOREARM SITE. BOTH WNL.

## 2023-01-10 NOTE — NUR
DILT DRIP TITRATED TO 2.5MG PER PROTOCOL/EMAR. PT REMAINS WITHOUT PAIN AND
ALERT IN [facility] VISITING WITH FAMILY FRIEND AND  AT BEDSIDE.

## 2023-01-11 NOTE — NUR
PATIENT AWAKE, SITTING IN BED, DRINKING DECAF COFFEE, WITH SPOUSE AT BEDSIDE.
PATIENT REPORTS SLEEPING WELL AND HAS NO COMPLAINTS OR NEEDS EXPRESSED THIS
MORNING. PATIENT IS ORIENTED TO SELF, TIME, LOCATION AND SITUATION THIS
MORNING. RESPIRATIONS EQUAL AND UNLABORED, CLEAR LUNG SOUNDS THROUGHOUT.
PATIENT ON ROOM AIR.
ABDOMEN SOFT, NONTENDER, NONDISTENDED WITH ACTIVE BOWEL SOUNDS. NO EDEMA TO
EXTREMITIES NOTED. SKIN WARM,DRY, AND PINK.
IV INFUSING WITHOUT DIFFICULTY. BILATERAL IV'S FLUSH EASILY, NO HEMATOMAS
NOTED, DRESSINGS CDI.
SPOUSE REMAINS AT BEDSIDE. PATIENT AND SPOUSE GIVEN MEAL TRAY/SANDWICH AND
COFFEE.

## 2023-01-11 NOTE — NUR
RESTING IN BED. UPON ASKING
PATIENT HOW SHE WAS FEELING, PATIENT STATES SHE HAS
BEEN FEELING DIZZY SINCE SHE WALKED, ALSO C/O SLIGHT NAUSEA. HR MID-90'S.
BP-115/83. DR. BROWNLEE AWARE. ORDERS RECIEVED TO START CARDIZEM 120 MG PO. WILL
GIVE WHEN VARIFIED.

## 2023-01-11 NOTE — NUR
CARDIZEM 120 MG PO GIVEN FOLLOWED BY ZOFRAN 4 MG IV FOR NAUSEA. UP TO BR, WHEN
UP TO BR HR . CONTINUES TO C/O OF NAUSEA. WHILE IN BR, HAD UNMEAUSRED
VOID AND SMALL LOOSE STOOL. BACK TO BED W/O INCIDENT.

## 2023-01-11 NOTE — NUR
I was able to visit with Susana and her  today.  Susana is awake, alert,
and oriented.  She is happy with her care here in the hospital.  She plans to
return to home on discharge with her .  They both report that they live
very independantly and can meet all their home care needs.  They do not have
food insecurity per their statements and there are no dificulties with being
able to pay for housing, food, medications, utilities, etc.  They are happy
with the care here at the hospital and deny questions or additional care needs
at this time.

## 2023-01-11 NOTE — NUR
RN IN ROOM TO ASSESS PT WITH AM LAB STAFF - PT ALERT AND ORIENTED, FORGETFUL
OF SOME DETAILS THROUGH THE NIGHT. DENIES CHEST PAIN OR PRESSURE. REMAINS IN
CONSISTANT AFLUTTER WITH 3/1 RATIO, REGULAR IN RATE. PT DENIES NEEDS AT THIS
TIME. CALL LIGHT AT SIDE.

## 2023-01-11 NOTE — NUR
STATES SHE DOES FEEL A LITTLE BETTER. BACK UP TO BR TO EXPELL LOOSE STOOL AND
URINE.  WHEN UP, UPON RETURN TO BED HR MID 90'S.

## 2023-01-11 NOTE — NUR
RN IN ROOM TO ANSWER CALL LIGHT - PT USES BSC WITH STANDBY ASSIST HELPING WITH
LINES. URINE CONCENTRATED, ENCOURAGED ORAL FLUIDS. HR TOLERATED AMBULATION
WITHOUT INCREASE IN HR OUTSIDE OF PARAMETERS. ASSESSMENT OTHERWISE UNCHANGED.
 REMAINS AT BEDSIDE. CALL LIGHT IN REACH.

## 2023-01-11 NOTE — NUR
PT AMBULATED TO THE BATHROOM. HEART RATE UP  WITH ACITIVITY. NOW BACK IN
BED. HEART RATE IN THE 90S. PT STATES SHE IS STILL MILDY NAUSEATED.  
REMAINS AT BEDSIDE. CALL LIGHT WITHIN REACH. WILL CONTINUE TO MONITOR.

## 2023-01-11 NOTE — NUR
PT AMBULATED TO BATHROOM AND IN HALLWAY. PT STEADY ON FEET. RESPIRATIONS EVEN
AND UNLABORED. PT DENIES DIZZINESS OR SHORTNESS OF BREATH. HEART RATE UP TO
110 WITH AMBULATION. BACK INTO THE 90S AT REST. CALL LIGHT WITHIN REACH. WILL
CONTINUE TO MONITOR.

## 2023-01-11 NOTE — NUR
Bedside report received from outgoing nurse, MARGARET Clement. Initial assessment
performed with no critical interventions necessary. All vital signs stable
with no complaints or indications of respiratory distress, fever or pain.
Patient assisted to toilet with sustained normal heart rate and no issues
ambulating.  at bedside. Will continue to monitor.

## 2023-01-11 NOTE — EKG
[facility]
                                    [address] [place]  [address]
_________________________________________________________________________________________
                                                                 Signed   
 
 
Sinus tachycardia
Rightward axis
Possible Anterior infarct (cited on or before 21-SEP-2020)
ST \T\ T wave abnormality, consider lateral ischemia Abnormal ECG When compared with ECG
of 21-SEP-2020 17:10,
Vent. rate has increased BY  78 BPM
ST now depressed in Lateral leads
T wave inversion now evident in Inferior leads
T wave inversion no longer evident in Anterior leads
Confirmed by JESS BROWNLEE MD ([id number]) on 1/11/2023 7:23:45 AM
 
 
 
 
 
 
 
 
 
 
 
 
 
 
 
 
 
 
 
 
 
 
 
 
 
 
 
 
 
 
 
 
 
 
 
    Electronically Signed By: JESS BROWNLEE MD  01/11/23 0723
_________________________________________________________________________________________
PATIENT NAME:     NILES QUIJANO                
MEDICAL RECORD #: [id number]                     Electrocardiogram             
          ACCT #: [id number]  
DATE OF BIRTH:   [birth year]                                       
PHYSICIAN:   JESS BROWNLEE MD                   REPORT #: [id number]
REPORT IS CONFIDENTIAL AND NOT TO BE RELEASED WITHOUT AUTHORIZATION

## 2023-01-11 NOTE — NUR
All vital signs stable with complaints of headache resolved. Patient assisted
to toilet with no issues ambulating. Will continue to monitor.

## 2023-01-12 NOTE — NUR
Repeat assessment performed with no acute changes since previous assessment.
All vital signs stable with no indications of respiratory distress, fever or
pain. Patient assisted to toilet. Patient's  remains at bedside.

## 2023-01-12 NOTE — NUR
Patient resting comfortably with  at bedside. All vital signs stable
with no indications of respiratory distress, fever or pain.

## 2023-01-12 NOTE — NUR
THIS RN IN TO GIVE PATIENT HER MORNING MEDICATIONS. PATIENT SPOKE WITH MD BROWNLEE. PATIENT WILL BE DISCHARGED WITH CARDIAC FOLLOW UP. WILL CALL CASE
MANAGEMENT AND HAVE THEM ASSIST WITH THIS PORTION OF REFERAL. PATIENT DENIES
ANY OTHER NEEDS AT THIS TIME.

## 2023-01-12 NOTE — NUR
INTO SPEAK WITH PATIENT AND . PATIENT REQUESTING TO ESTABLISHED WITH
DR. WALDEN.PATIENT HAS SEEN [facility] IN THE PAST. PATIENT AND
 ARE OKAY TRAVELING TO [place] FOR APPOINTMENT IF NEEDED. LEFT MESSAGE
FOR  AT [facility].

## 2023-01-12 NOTE — NUR
IN TO ADMINISTER MEDICATION, SEE MAR. PT SITTING UP IN BED AND TAKES PO
MEDICATION WITH NO ISSUES.
SBA FROM BED TO RESTROOM AND BACK TO BED. PT HAS A STEADY GAIT TO AND FROM
RESTROOM. PT DENIES DIZZINESS OR LIGHT HEADEDNESS WHILE STANDING.
VITALS AND I&Os COMPLETE.
ASSESSMENT COMPLETE.
LUNG SOUNDS CLEAR. BOWEL TONES ACTIVE. PT DENIES ANY PAIN AT THIS TIME.
 IN ROOM AND REQUESTING BLANKET, BLANKET PROVIDED.
NO OTHER NEEDS REPORTED AT THIS TIME. CALL LIGHT IN REACH.

## 2023-01-12 NOTE — NUR
VISITING WITH PT AND  DAVID. RECOMMENDED TO SEE CARDIOLOGIST, MARGARET RENDON
IN TO DISCUSS POC NEXT. HAD PRAYER FOR WISDOM, WILL FOLLOW

## 2023-01-12 NOTE — NUR
THIS RN IN TO SPEAK WITH FAMILY ABOUT PLAN OF CARE. PATIENT IS FEELING
UNSETTELED WITH DISCHARGING WITH JUST CHANGING HEART MEDICATIONS AROUND. MD BROWNLEE WAS UPDATED AND AGREES WITH PATIENT AND WILL KEEP PATIENT OVER NIGHT TO
MONITOR. PATIENT AND FAMILY UPDATED AND PATIENT WILL TRANSFER TO THE MEDICAL
UNIT. WILL CONITNUE TO CLOSELY MONITOR.

## 2023-01-12 NOTE — NUR
REPORT FROM NINO GONZALEZ RN. PATIENT MOVED FROM ROOM 127 IN CCU TO ROOM 113 IN
MED-SURG WITH TELEMETRY IN PLACE, IN BED WITH THIS NURSE AND NINO GONZALEZ RN.
ORIENTED TO ROOM. SPOUSE AT BEDSIDE. ASSESSMENT COMPLETED. VITALS OBTAINED.

## 2023-01-12 NOTE — NUR
THIS RN OVER TO UPDATE MD KEM SANTIAGO TPATIENT FAILED THE "ROAD TEST" PATIENT
WAS UP TO THE BATHROOM WITH A HR HIGH . PATIENT JUST MOVING TO GET OUT
OF BED WITH A HR 'S. PATIENT COMPLAINING OF DIZZINESS WITH AMBULATION.
NEW ORDER FOR 30MG PO DILTIAZEMN TO BE GIVEN IN ADDITION TO HER LONG ACTING
CARDIZEM SHE HAD THIS AM. WILL RE-EVALUATE IN APPROX 1.5 HRS FOR
EFFECTIVENESS. PATIENT REMAINS IN ATRIAL FLUTTER.

## 2023-01-12 NOTE — NUR
REPORT RECEIVED FROM MARGARET VARGAS. PT SITTING UP IN BED WITH MEAL. PT REQUESTING
SOME COFFEE. AIDE RODRÍGUEZ ASKED TO GET PT DECAF COFFEE.
PT REPORTS NO OTHER NEEDS AT THIS TIME. CALL LIGHT IN REACH.
 IN ROOM.

## 2023-01-12 NOTE — NUR
Repeat assessment performed with no acute changes since previous assessment.
All vital signs stable with no indications or complaints of respiratory
distress, fever or pain. Patient resting with  at bedside. Will
continue to monitor.

## 2023-01-12 NOTE — NUR
PATIENT SHIFT REPORT RECIVED FROM NIGHT SHIFT RN. PATIENT RESTING IN BED WITH
HER  AT THE BEDSIDE. PATIENT HAD A CHANDNI OVER ALL AND PER REPORT
HER HR HAS BEEN WELL CONTROLLED WITH ACTIVITY AND REST. WILL CONTINUE TO
CLOSELY MONITOR.

## 2023-01-13 NOTE — NUR
received shift report. pt resting in bed, eyes closed. breathing even and
unlabored.  at bedside. call light within reach.

## 2023-01-13 NOTE — NUR
SPOKE WITH PATIENT IN ROOM.   JUST LEFT TO GO TO THE STORE.  SHE IS
LOOKING FORWARD TO GETTING HOME.  PATIENT HAS SOME MEMORY ISSUES, BUT IS
ORIENTED TO PERSON AND PLACE AND SITUATION.  NO SPECIFIC COMPLAINTS.

## 2023-01-13 NOTE — NUR
MORNING ASSESSMENT COMPLETE. PT TRANSFERRED TO CHAIR, SBA, HEARTRATE REMAINED
82 BPM ON TELE. DENIES CHEST PAIN. REPORTS LEGS FEEL WEAK BUT FEELS ITS DUE TO
NOT GETTING UP. PT STATES SHE HASNT HAD A BM IN 3 DAYS BUT IS WILLING TO TRY
AFTER BREAKFAST.  AT BEDSIDE. CALL LIGHT WITHIN REACH.

## 2023-01-13 NOTE — NUR
PT RESTING IN BED, EYES CLOSED, BREATHING EVEN AND LABORED.  LEFT TO
PHARMACY. TELE 79 BPM. CALL LIGHT WITHIN REACH.

## 2023-01-13 NOTE — NUR
PT MOVED TO M/S THURSDAY.PT LOOKS AND FEELS BETTER.  IN RM, PT IN
CHAIR. HAD GOOD VISIT, GAVE BLESSING. DR BROWNLEE IN TO VISIT WITH PT. WILL
FOLLOW

## 2023-01-13 NOTE — NUR
IN TO ROUND ON PT. PT LAYING IN BED WITH EYES OPEN AND RESPONDS WHEN
ADDRESSED.
PT REQUESTING TO USE RESTROOM. SBA FROM BED TO RESTROOM AND BACK TO BED.
PT HAS A STEADY GAIT. PT DENIES FEELING DIZZY OR LIGHT HEADED WHEN STANDING.
VITALS AND I&Os COMPLETE.
ASSESSMENT COMPLETE. LUNG SOUNDS CLEAR. BOWEL TONES ACTIVE. HEART TONES
IRREGULAR. PT REPORTS NO PAIN AT THIS TIME.
PT REPORTS NO OTHER NEEDS AT THIS TIME. CALL LIGHT IN REACH.
 IN ROOM ON COUCH.

## 2023-01-15 NOTE — EKG
[facility]
                                    [address]
                                  [place]  [address]
_________________________________________________________________________________________
                                                                 Signed   
 
 
Atrial fibrillation
Septal infarct (cited on or before 21-SEP-2020)
Abnormal ECG
When compared with ECG of 10-KEATON-2023 18:10,
Atrial fibrillation has replaced Atrial flutter
Questionable change in QRS axis
Non-specific change in ST segment in Lateral leads
Nonspecific T wave abnormality, worse in Anterior leads
Confirmed by PIERCE ARIZA MD ([id number]) on 1/15/2023 3:58:01 PM
 
 
 
 
 
 
 
 
 
 
 
 
 
 
 
 
 
 
 
 
 
 
 
 
 
 
 
 
 
 
 
 
 
 
 
 
    Electronically Signed By: PIERCE ARIZA MD  01/15/23 1558
_________________________________________________________________________________________
PATIENT NAME:     NILES QUIJANO                
MEDICAL RECORD #: [id number]                     Electrocardiogram             
          ACCT #: [id number]  
DATE OF BIRTH:   [birth year]                                       
PHYSICIAN:   PIERCE ARIZA MD                    REPORT #: [id number]
REPORT IS CONFIDENTIAL AND NOT TO BE RELEASED WITHOUT AUTHORIZATION